# Patient Record
Sex: FEMALE | Race: WHITE | NOT HISPANIC OR LATINO | Employment: OTHER | URBAN - METROPOLITAN AREA
[De-identification: names, ages, dates, MRNs, and addresses within clinical notes are randomized per-mention and may not be internally consistent; named-entity substitution may affect disease eponyms.]

---

## 2020-08-26 ENCOUNTER — APPOINTMENT (EMERGENCY)
Dept: RADIOLOGY | Facility: HOSPITAL | Age: 65
End: 2020-08-26
Payer: COMMERCIAL

## 2020-08-26 ENCOUNTER — HOSPITAL ENCOUNTER (EMERGENCY)
Facility: HOSPITAL | Age: 65
Discharge: HOME | End: 2020-08-26
Attending: EMERGENCY MEDICINE | Admitting: EMERGENCY MEDICINE
Payer: COMMERCIAL

## 2020-08-26 ENCOUNTER — OFFICE VISIT (OUTPATIENT)
Dept: INTERNAL MEDICINE CLINIC | Age: 65
End: 2020-08-26
Payer: COMMERCIAL

## 2020-08-26 VITALS
TEMPERATURE: 98.6 F | SYSTOLIC BLOOD PRESSURE: 160 MMHG | HEART RATE: 97 BPM | DIASTOLIC BLOOD PRESSURE: 80 MMHG | OXYGEN SATURATION: 98 % | HEIGHT: 66 IN | WEIGHT: 158 LBS | BODY MASS INDEX: 25.39 KG/M2

## 2020-08-26 VITALS
HEART RATE: 103 BPM | OXYGEN SATURATION: 97 % | DIASTOLIC BLOOD PRESSURE: 92 MMHG | TEMPERATURE: 98.9 F | BODY MASS INDEX: 25.66 KG/M2 | SYSTOLIC BLOOD PRESSURE: 189 MMHG | WEIGHT: 159 LBS | RESPIRATION RATE: 18 BRPM

## 2020-08-26 DIAGNOSIS — T14.90XA TRAUMA: ICD-10-CM

## 2020-08-26 DIAGNOSIS — Z12.11 COLON CANCER SCREENING: ICD-10-CM

## 2020-08-26 DIAGNOSIS — M25.521 RIGHT ELBOW PAIN: Primary | ICD-10-CM

## 2020-08-26 DIAGNOSIS — E66.3 OVERWEIGHT: ICD-10-CM

## 2020-08-26 DIAGNOSIS — T14.8XXA BRUISING: ICD-10-CM

## 2020-08-26 DIAGNOSIS — M79.89 PAIN AND SWELLING OF UPPER EXTREMITY, RIGHT: ICD-10-CM

## 2020-08-26 DIAGNOSIS — T07.XXXA MULTIPLE CONTUSIONS: ICD-10-CM

## 2020-08-26 DIAGNOSIS — Z12.39 BREAST CANCER SCREENING: ICD-10-CM
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
## 2020-08-26 DIAGNOSIS — S52.021A OLECRANON FRACTURE, RIGHT, CLOSED, INITIAL ENCOUNTER: Primary | ICD-10-CM

## 2020-08-26 DIAGNOSIS — T07.XXXA MULTIPLE ABRASIONS: ICD-10-CM

## 2020-08-26 DIAGNOSIS — S93.401A RIGHT ANKLE SPRAIN: ICD-10-CM

## 2020-08-26 DIAGNOSIS — M79.601 PAIN AND SWELLING OF UPPER EXTREMITY, RIGHT: ICD-10-CM

## 2020-08-26 PROCEDURE — 99284 EMERGENCY DEPT VISIT MOD MDM: CPT | Performed by: PHYSICIAN ASSISTANT

## 2020-08-26 PROCEDURE — 90471 IMMUNIZATION ADMIN: CPT

## 2020-08-26 PROCEDURE — 1036F TOBACCO NON-USER: CPT | Performed by: INTERNAL MEDICINE

## 2020-08-26 PROCEDURE — 73610 X-RAY EXAM OF ANKLE: CPT

## 2020-08-26 PROCEDURE — 73630 X-RAY EXAM OF FOOT: CPT

## 2020-08-26 PROCEDURE — 73090 X-RAY EXAM OF FOREARM: CPT

## 2020-08-26 PROCEDURE — 90715 TDAP VACCINE 7 YRS/> IM: CPT | Performed by: PHYSICIAN ASSISTANT

## 2020-08-26 PROCEDURE — 3008F BODY MASS INDEX DOCD: CPT | Performed by: INTERNAL MEDICINE

## 2020-08-26 PROCEDURE — 3725F SCREEN DEPRESSION PERFORMED: CPT | Performed by: INTERNAL MEDICINE

## 2020-08-26 PROCEDURE — 99203 OFFICE O/P NEW LOW 30 MIN: CPT | Performed by: INTERNAL MEDICINE

## 2020-08-26 PROCEDURE — 99284 EMERGENCY DEPT VISIT MOD MDM: CPT

## 2020-08-26 PROCEDURE — 73080 X-RAY EXAM OF ELBOW: CPT

## 2020-08-26 RX ADMIN — TETANUS TOXOID, REDUCED DIPHTHERIA TOXOID AND ACELLULAR PERTUSSIS VACCINE, ADSORBED 0.5 ML: 5; 2.5; 8; 8; 2.5 SUSPENSION INTRAMUSCULAR at 18:09
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
## 2020-08-27 ENCOUNTER — OFFICE VISIT (OUTPATIENT)
Dept: INTERNAL MEDICINE CLINIC | Age: 65
End: 2020-08-27
Payer: COMMERCIAL

## 2020-08-27 ENCOUNTER — OFFICE VISIT (OUTPATIENT)
Dept: OBGYN CLINIC | Facility: HOSPITAL | Age: 65
End: 2020-08-27
Payer: COMMERCIAL

## 2020-08-27 ENCOUNTER — TELEPHONE (OUTPATIENT)
Dept: INTERNAL MEDICINE CLINIC | Age: 65
End: 2020-08-27

## 2020-08-27 ENCOUNTER — TELEPHONE (OUTPATIENT)
Dept: OBGYN CLINIC | Facility: HOSPITAL | Age: 65
End: 2020-08-27

## 2020-08-27 VITALS
WEIGHT: 155 LBS | HEIGHT: 66 IN | BODY MASS INDEX: 24.91 KG/M2 | TEMPERATURE: 97.1 F | DIASTOLIC BLOOD PRESSURE: 100 MMHG | SYSTOLIC BLOOD PRESSURE: 180 MMHG | HEART RATE: 94 BPM

## 2020-08-27 VITALS
DIASTOLIC BLOOD PRESSURE: 94 MMHG | HEIGHT: 66 IN | SYSTOLIC BLOOD PRESSURE: 158 MMHG | HEART RATE: 112 BPM | BODY MASS INDEX: 24.91 KG/M2 | WEIGHT: 155 LBS

## 2020-08-27 DIAGNOSIS — S52.021D CLOSED FRACTURE OF OLECRANON PROCESS OF RIGHT ULNA WITH ROUTINE HEALING, SUBSEQUENT ENCOUNTER: ICD-10-CM

## 2020-08-27 DIAGNOSIS — I10 UNCONTROLLED STAGE 2 HYPERTENSION: ICD-10-CM

## 2020-08-27 DIAGNOSIS — Z00.00 ANNUAL PHYSICAL EXAM: ICD-10-CM

## 2020-08-27 DIAGNOSIS — S52.021A CLOSED FRACTURE OF RIGHT OLECRANON PROCESS, INITIAL ENCOUNTER: Primary | ICD-10-CM

## 2020-08-27 DIAGNOSIS — I10 ESSENTIAL HYPERTENSION: Primary | ICD-10-CM

## 2020-08-27 DIAGNOSIS — T14.90XA TRAUMA: ICD-10-CM

## 2020-08-27 DIAGNOSIS — S93.401A SPRAIN OF UNSPECIFIED LIGAMENT OF RIGHT ANKLE, INITIAL ENCOUNTER: ICD-10-CM
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
## 2020-08-27 PROCEDURE — 1036F TOBACCO NON-USER: CPT | Performed by: ORTHOPAEDIC SURGERY

## 2020-08-27 PROCEDURE — 99203 OFFICE O/P NEW LOW 30 MIN: CPT | Performed by: ORTHOPAEDIC SURGERY

## 2020-08-27 PROCEDURE — 3080F DIAST BP >= 90 MM HG: CPT | Performed by: INTERNAL MEDICINE

## 2020-08-27 PROCEDURE — 1036F TOBACCO NON-USER: CPT | Performed by: INTERNAL MEDICINE

## 2020-08-27 PROCEDURE — 3077F SYST BP >= 140 MM HG: CPT | Performed by: INTERNAL MEDICINE

## 2020-08-27 PROCEDURE — 3008F BODY MASS INDEX DOCD: CPT | Performed by: INTERNAL MEDICINE

## 2020-08-27 PROCEDURE — 99214 OFFICE O/P EST MOD 30 MIN: CPT | Performed by: INTERNAL MEDICINE

## 2020-08-27 PROCEDURE — 3008F BODY MASS INDEX DOCD: CPT | Performed by: ORTHOPAEDIC SURGERY

## 2020-08-27 RX ORDER — LOSARTAN POTASSIUM AND HYDROCHLOROTHIAZIDE 12.5; 5 MG/1; MG/1
1 TABLET ORAL DAILY
Qty: 60 TABLET | Refills: 1 | Status: SHIPPED | OUTPATIENT
Start: 2020-08-27 | End: 2021-04-26 | Stop reason: SDUPTHER

## 2020-08-27 RX ORDER — CEFAZOLIN SODIUM 2 G/50ML
2000 SOLUTION INTRAVENOUS ONCE
Status: CANCELLED | OUTPATIENT
Start: 2020-08-27 | End: 2020-08-27

## 2020-08-27 RX ORDER — HYDROCODONE BITARTRATE AND ACETAMINOPHEN 5; 325 MG/1; MG/1
1 TABLET ORAL EVERY 6 HOURS PRN
Qty: 30 TABLET | Refills: 0 | Status: SHIPPED | OUTPATIENT
Start: 2020-08-27 | End: 2020-08-31 | Stop reason: HOSPADM

## 2020-08-27 NOTE — TELEPHONE ENCOUNTER
Patient is on schedule already for tomorrow in [facility]  She refuses to wear the sling from ER due to pain  Feels better without it

## 2020-08-27 NOTE — TELEPHONE ENCOUNTER
Pt took splint off too  Wanted to come today as she cannot tolerate splint and wanted to know repercussions of not wearing it  Was originally told she could not be seen today  Appt moved up to today w/Dr Marmolejo

## 2020-08-27 NOTE — TELEPHONE ENCOUNTER
Dr Esteban Freeman went to the ER last night as requested by you  When she was seen they stated that her BP was extremely elevated  She is asking since you seen her yesterday in the office and are aware of her BP being elevated if you can send something into the [facility] in [place] on [address]? If you have any further questions you can reach the PT at [phone]  Thank you!
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
## 2020-08-27 NOTE — TELEPHONE ENCOUNTER
Left msg for patient to call office to schedule appt with Dr Juarez Carroll for this afternoon Belen Tenorio or tomorrow [facility]
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
## 2020-08-27 NOTE — PATIENT INSTRUCTIONS
PLEASE CHECK YOUR BLOOD PRESSURE TWICE A DAY  KEEP A BLOOD PRESSURE LOG  IF YOUR BLOOD PRESSURE IS CONSISTENTLY ABOVE 130/80, PLEASE CALL THE OFFICE

## 2020-08-27 NOTE — TELEPHONE ENCOUNTER
She needs surgery    If she wants Brogle to do it next week - she should be seen today (BE) or tomorrow (ARLETTE)

## 2020-08-27 NOTE — RESULT ENCOUNTER NOTE
Call patient at phone number listed  Patient name and date of birth use is positive patient identifiers  Currently at orthopedic office  Will call back after appointment

## 2020-08-28 NOTE — PRE-PROCEDURE INSTRUCTIONS
Pre-Surgery Instructions:   Medication Instructions    HYDROcodone-acetaminophen (NORCO) 5-325 mg per tablet Instructed patient per Anesthesia Guidelines   Ibuprofen (ADVIL PO) Instructed patient per Anesthesia Guidelines   losartan-hydrochlorothiazide (HYZAAR) 50-12 5 mg per tablet Instructed patient per Anesthesia Guidelines  Have you had / have a sore throat? No  have you had / have a cough less than 1 week? No  Have you had / have a fever greater than 100 0 - 100  4? No  Are you experiencing any shortness of breath? No    Review with patient via phone medications and showering instructions  Advice stop NSAID'S ok tylenol products  Advice don't take Hyzaar the morning of the day of surgery  Advice ASC call  Verbalized understanding  ACE/ARB Med Class   Continue this medication up to the evening before surgery/procedure, but do not take the morning of the day of surgery  Acetaminophen Med Class   Continue to take this medication on your normal schedule  If this is an oral medication and you take it in the morning, then you may take this medicine with a sip of water  NSAID Med Class   Stop taking this medication at least 3 days prior to surgery/procedure  Opioid Med Class   Continue to take this medication on your normal schedule    If this is an oral medication and you take it in the morning, then you may take this medicine with a sip of water

## 2020-08-30 ENCOUNTER — ANESTHESIA EVENT (OUTPATIENT)
Dept: PERIOP | Facility: HOSPITAL | Age: 65
End: 2020-08-30
Payer: COMMERCIAL

## 2020-08-30 PROBLEM — J45.909 ASTHMA: Status: ACTIVE | Noted: 2020-08-30

## 2020-08-30 NOTE — ANESTHESIA PREPROCEDURE EVALUATION
Procedure:  OPEN REDUCTION W/ INTERNAL FIXATION (ORIF) ELBOW (Right Elbow)    Relevant Problems   CARDIO   (+) Essential hypertension      PULMONARY   (+) Smoking        Physical Exam    Airway    Mallampati score: I  TM Distance: >3 FB  Neck ROM: full     Dental   No notable dental hx     Cardiovascular      Pulmonary      Other Findings        Anesthesia Plan  ASA Score- 2     Anesthesia Type- general and regional with ASA Monitors  Additional Monitors:   Airway Plan: ETT  Comment: Supraclavicular Block Planned          Plan Factors-Exercise tolerance (METS): >4 METS  Chart reviewed  EKG reviewed  Existing labs reviewed  Patient is a current smoker  Patient not instructed to abstain from smoking on day of procedure  Patient did not smoke on day of surgery  Induction- intravenous  Postoperative Plan-     Informed Consent- Anesthetic plan and risks discussed with patient and sibling  I personally reviewed this patient with the CRNA  Discussed and agreed on the Anesthesia Plan with the CRNA  Neymar Mckee Discussed with Patient the procedure for Supraclavicular Block, side effects including extended numbness of the extremity and potential for an incomplete Block  All questions answered  Consent given

## 2020-08-31 ENCOUNTER — TELEPHONE (OUTPATIENT)
Dept: OBGYN CLINIC | Facility: HOSPITAL | Age: 65
End: 2020-08-31

## 2020-08-31 ENCOUNTER — HOSPITAL ENCOUNTER (OUTPATIENT)
Dept: RADIOLOGY | Facility: HOSPITAL | Age: 65
Setting detail: OUTPATIENT SURGERY
Discharge: HOME | End: 2020-08-31
Payer: COMMERCIAL

## 2020-08-31 ENCOUNTER — HOSPITAL ENCOUNTER (OUTPATIENT)
Facility: HOSPITAL | Age: 65
Setting detail: OUTPATIENT SURGERY
Discharge: HOME | End: 2020-08-31
Attending: ORTHOPAEDIC SURGERY | Admitting: ORTHOPAEDIC SURGERY
Payer: COMMERCIAL

## 2020-08-31 ENCOUNTER — ANESTHESIA (OUTPATIENT)
Dept: PERIOP | Facility: HOSPITAL | Age: 65
End: 2020-08-31
Payer: COMMERCIAL

## 2020-08-31 VITALS
DIASTOLIC BLOOD PRESSURE: 74 MMHG | OXYGEN SATURATION: 93 % | BODY MASS INDEX: 25.55 KG/M2 | SYSTOLIC BLOOD PRESSURE: 106 MMHG | HEIGHT: 66 IN | WEIGHT: 159 LBS | RESPIRATION RATE: 18 BRPM | HEART RATE: 66 BPM | TEMPERATURE: 98.7 F

## 2020-08-31 DIAGNOSIS — S52.021A CLOSED FRACTURE OF RIGHT OLECRANON PROCESS, INITIAL ENCOUNTER: ICD-10-CM

## 2020-08-31 DIAGNOSIS — S52.021G: ICD-10-CM

## 2020-08-31 DIAGNOSIS — S52.021D CLOSED FRACTURE OF OLECRANON PROCESS OF RIGHT ULNA WITH ROUTINE HEALING, SUBSEQUENT ENCOUNTER: Primary | ICD-10-CM
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
## 2020-08-31 PROBLEM — F17.200 SMOKING: Status: ACTIVE | Noted: 2020-08-31

## 2020-08-31 PROBLEM — IMO0001 SMOKING: Status: ACTIVE | Noted: 2020-08-31

## 2020-08-31 LAB
ANION GAP SERPL CALCULATED.3IONS-SCNC: 7 MMOL/L (ref 4–13)
ATRIAL RATE: 93 BPM
BUN SERPL-MCNC: 11 MG/DL (ref 5–25)
CALCIUM SERPL-MCNC: 9.7 MG/DL (ref 8.3–10.1)
CHLORIDE SERPL-SCNC: 99 MMOL/L (ref 100–108)
CO2 SERPL-SCNC: 28 MMOL/L (ref 21–32)
CREAT SERPL-MCNC: 0.87 MG/DL (ref 0.6–1.3)
ERYTHROCYTE [DISTWIDTH] IN BLOOD BY AUTOMATED COUNT: 12.2 % (ref 11.6–15.1)
GFR SERPL CREATININE-BSD FRML MDRD: 72 ML/MIN/1.73SQ M
GLUCOSE P FAST SERPL-MCNC: 148 MG/DL (ref 65–99)
GLUCOSE SERPL-MCNC: 148 MG/DL (ref 65–140)
HCT VFR BLD AUTO: 42.6 % (ref 34.8–46.1)
HGB BLD-MCNC: 14.4 G/DL (ref 11.5–15.4)
MCH RBC QN AUTO: 30.7 PG (ref 26.8–34.3)
MCHC RBC AUTO-ENTMCNC: 33.8 G/DL (ref 31.4–37.4)
MCV RBC AUTO: 91 FL (ref 82–98)
P AXIS: 75 DEGREES
PLATELET # BLD AUTO: 348 THOUSANDS/UL (ref 149–390)
PMV BLD AUTO: 10 FL (ref 8.9–12.7)
POTASSIUM SERPL-SCNC: 3.6 MMOL/L (ref 3.5–5.3)
PR INTERVAL: 160 MS
QRS AXIS: 59 DEGREES
QRSD INTERVAL: 78 MS
QT INTERVAL: 360 MS
QTC INTERVAL: 447 MS
RBC # BLD AUTO: 4.69 MILLION/UL (ref 3.81–5.12)
SODIUM SERPL-SCNC: 134 MMOL/L (ref 136–145)
T WAVE AXIS: 55 DEGREES
VENTRICULAR RATE: 93 BPM
WBC # BLD AUTO: 11.71 THOUSAND/UL (ref 4.31–10.16)

## 2020-08-31 PROCEDURE — 93010 ELECTROCARDIOGRAM REPORT: CPT | Performed by: INTERNAL MEDICINE

## 2020-08-31 PROCEDURE — C1713 ANCHOR/SCREW BN/BN,TIS/BN: HCPCS | Performed by: ORTHOPAEDIC SURGERY

## 2020-08-31 PROCEDURE — 80048 BASIC METABOLIC PNL TOTAL CA: CPT | Performed by: ANESTHESIOLOGY

## 2020-08-31 PROCEDURE — 73070 X-RAY EXAM OF ELBOW: CPT

## 2020-08-31 PROCEDURE — 93005 ELECTROCARDIOGRAM TRACING: CPT

## 2020-08-31 PROCEDURE — 85027 COMPLETE CBC AUTOMATED: CPT | Performed by: ANESTHESIOLOGY

## 2020-08-31 PROCEDURE — 24685 OPTX ULNAR FX PROX END W/FIX: CPT | Performed by: ORTHOPAEDIC SURGERY

## 2020-08-31 DEVICE — 3.5MM LCP OLECRANON PLATE 4 HOLES/RIGHT/112MM
Type: IMPLANTABLE DEVICE | Site: ELBOW | Status: FUNCTIONAL
Brand: LCP

## 2020-08-31 DEVICE — 3.5MM CORTEX SCREW SELF-TAPPING 20MM: Type: IMPLANTABLE DEVICE | Site: ELBOW | Status: FUNCTIONAL

## 2020-08-31 DEVICE — 3.5MM CORTEX SCREW SELF-TAPPING 30MM: Type: IMPLANTABLE DEVICE | Site: ELBOW | Status: FUNCTIONAL

## 2020-08-31 DEVICE — 4.0MM CANCELLOUS BONE SCREW FULLY THREADED/20MM: Type: IMPLANTABLE DEVICE | Site: ELBOW | Status: FUNCTIONAL

## 2020-08-31 DEVICE — 4.0MM CANCELLOUS BONE SCREW FULLY THREADED/22MM: Type: IMPLANTABLE DEVICE | Site: ELBOW | Status: FUNCTIONAL

## 2020-08-31 DEVICE — 3.5MM CORTEX SCREW SELF-TAPPING 28MM: Type: IMPLANTABLE DEVICE | Site: ELBOW | Status: FUNCTIONAL

## 2020-08-31 DEVICE — 3.5MM CORTEX SCREW SELF-TAPPING 24MM: Type: IMPLANTABLE DEVICE | Site: ELBOW | Status: FUNCTIONAL

## 2020-08-31 DEVICE — 3.5MM CORTEX SCREW SELF-TAPPING 55MM: Type: IMPLANTABLE DEVICE | Site: ELBOW | Status: FUNCTIONAL

## 2020-08-31 RX ORDER — ONDANSETRON 2 MG/ML
INJECTION INTRAMUSCULAR; INTRAVENOUS AS NEEDED
Status: DISCONTINUED | OUTPATIENT
Start: 2020-08-31 | End: 2020-08-31

## 2020-08-31 RX ORDER — HYDROMORPHONE HCL/PF 1 MG/ML
0.5 SYRINGE (ML) INJECTION
Status: DISCONTINUED | OUTPATIENT
Start: 2020-08-31 | End: 2020-08-31 | Stop reason: HOSPADM

## 2020-08-31 RX ORDER — OXYCODONE HYDROCHLORIDE 5 MG/1
5 TABLET ORAL EVERY 4 HOURS PRN
Qty: 30 TABLET | Refills: 0 | Status: SHIPPED | OUTPATIENT
Start: 2020-08-31 | End: 2020-09-10

## 2020-08-31 RX ORDER — ROCURONIUM BROMIDE 10 MG/ML
INJECTION, SOLUTION INTRAVENOUS AS NEEDED
Status: DISCONTINUED | OUTPATIENT
Start: 2020-08-31 | End: 2020-08-31

## 2020-08-31 RX ORDER — ALBUTEROL SULFATE 2.5 MG/3ML
2.5 SOLUTION RESPIRATORY (INHALATION) ONCE AS NEEDED
Status: DISCONTINUED | OUTPATIENT
Start: 2020-08-31 | End: 2020-08-31 | Stop reason: HOSPADM

## 2020-08-31 RX ORDER — SODIUM CHLORIDE, SODIUM LACTATE, POTASSIUM CHLORIDE, CALCIUM CHLORIDE 600; 310; 30; 20 MG/100ML; MG/100ML; MG/100ML; MG/100ML
125 INJECTION, SOLUTION INTRAVENOUS CONTINUOUS
Status: DISCONTINUED | OUTPATIENT
Start: 2020-08-31 | End: 2020-08-31 | Stop reason: HOSPADM

## 2020-08-31 RX ORDER — PROPOFOL 10 MG/ML
INJECTION, EMULSION INTRAVENOUS AS NEEDED
Status: DISCONTINUED | OUTPATIENT
Start: 2020-08-31 | End: 2020-08-31

## 2020-08-31 RX ORDER — ONDANSETRON 2 MG/ML
4 INJECTION INTRAMUSCULAR; INTRAVENOUS ONCE AS NEEDED
Status: DISCONTINUED | OUTPATIENT
Start: 2020-08-31 | End: 2020-08-31 | Stop reason: HOSPADM

## 2020-08-31 RX ORDER — ROPIVACAINE HYDROCHLORIDE 5 MG/ML
INJECTION, SOLUTION EPIDURAL; INFILTRATION; PERINEURAL AS NEEDED
Status: DISCONTINUED | OUTPATIENT
Start: 2020-08-31 | End: 2020-08-31

## 2020-08-31 RX ORDER — OXYCODONE HYDROCHLORIDE 5 MG/1
5 TABLET ORAL ONCE AS NEEDED
Status: DISCONTINUED | OUTPATIENT
Start: 2020-08-31 | End: 2020-08-31 | Stop reason: HOSPADM

## 2020-08-31 RX ORDER — DEXMEDETOMIDINE HYDROCHLORIDE 100 UG/ML
INJECTION, SOLUTION INTRAVENOUS AS NEEDED
Status: DISCONTINUED | OUTPATIENT
Start: 2020-08-31 | End: 2020-08-31

## 2020-08-31 RX ORDER — ACETAMINOPHEN 325 MG/1
975 TABLET ORAL ONCE
Status: DISCONTINUED | OUTPATIENT
Start: 2020-08-31 | End: 2020-08-31

## 2020-08-31 RX ORDER — FENTANYL CITRATE/PF 50 MCG/ML
25 SYRINGE (ML) INJECTION
Status: DISCONTINUED | OUTPATIENT
Start: 2020-08-31 | End: 2020-08-31 | Stop reason: HOSPADM

## 2020-08-31 RX ORDER — ONDANSETRON 2 MG/ML
4 INJECTION INTRAMUSCULAR; INTRAVENOUS EVERY 6 HOURS PRN
Status: DISCONTINUED | OUTPATIENT
Start: 2020-08-31 | End: 2020-08-31 | Stop reason: HOSPADM

## 2020-08-31 RX ORDER — ACETAMINOPHEN 500 MG
1000 TABLET ORAL EVERY 6 HOURS PRN
COMMUNITY
End: 2021-12-09 | Stop reason: ALTCHOICE

## 2020-08-31 RX ORDER — HYDROMORPHONE HCL/PF 1 MG/ML
0.2 SYRINGE (ML) INJECTION ONCE AS NEEDED
Status: DISCONTINUED | OUTPATIENT
Start: 2020-08-31 | End: 2020-08-31 | Stop reason: HOSPADM

## 2020-08-31 RX ORDER — TRAMADOL HYDROCHLORIDE 50 MG/1
50 TABLET ORAL EVERY 6 HOURS PRN
Qty: 30 TABLET | Refills: 0 | Status: SHIPPED | OUTPATIENT
Start: 2020-08-31 | End: 2020-09-10

## 2020-08-31 RX ORDER — MAGNESIUM HYDROXIDE 1200 MG/15ML
LIQUID ORAL AS NEEDED
Status: DISCONTINUED | OUTPATIENT
Start: 2020-08-31 | End: 2020-08-31 | Stop reason: HOSPADM

## 2020-08-31 RX ORDER — DEXAMETHASONE SODIUM PHOSPHATE 10 MG/ML
INJECTION, SOLUTION INTRAMUSCULAR; INTRAVENOUS AS NEEDED
Status: DISCONTINUED | OUTPATIENT
Start: 2020-08-31 | End: 2020-08-31

## 2020-08-31 RX ORDER — CEFAZOLIN SODIUM 2 G/50ML
2000 SOLUTION INTRAVENOUS ONCE
Status: COMPLETED | OUTPATIENT
Start: 2020-08-31 | End: 2020-08-31

## 2020-08-31 RX ORDER — ALBUTEROL SULFATE 90 UG/1
AEROSOL, METERED RESPIRATORY (INHALATION) AS NEEDED
Status: DISCONTINUED | OUTPATIENT
Start: 2020-08-31 | End: 2020-08-31

## 2020-08-31 RX ORDER — FENTANYL CITRATE 50 UG/ML
INJECTION, SOLUTION INTRAMUSCULAR; INTRAVENOUS AS NEEDED
Status: DISCONTINUED | OUTPATIENT
Start: 2020-08-31 | End: 2020-08-31

## 2020-08-31 RX ORDER — NEOSTIGMINE METHYLSULFATE 1 MG/ML
INJECTION INTRAVENOUS AS NEEDED
Status: DISCONTINUED | OUTPATIENT
Start: 2020-08-31 | End: 2020-08-31

## 2020-08-31 RX ORDER — LIDOCAINE HYDROCHLORIDE 10 MG/ML
INJECTION, SOLUTION EPIDURAL; INFILTRATION; INTRACAUDAL; PERINEURAL AS NEEDED
Status: DISCONTINUED | OUTPATIENT
Start: 2020-08-31 | End: 2020-08-31

## 2020-08-31 RX ORDER — METOCLOPRAMIDE HYDROCHLORIDE 5 MG/ML
10 INJECTION INTRAMUSCULAR; INTRAVENOUS ONCE AS NEEDED
Status: DISCONTINUED | OUTPATIENT
Start: 2020-08-31 | End: 2020-08-31 | Stop reason: HOSPADM

## 2020-08-31 RX ORDER — GLYCOPYRROLATE 0.2 MG/ML
INJECTION INTRAMUSCULAR; INTRAVENOUS AS NEEDED
Status: DISCONTINUED | OUTPATIENT
Start: 2020-08-31 | End: 2020-08-31

## 2020-08-31 RX ORDER — LIDOCAINE HYDROCHLORIDE 10 MG/ML
0.5 INJECTION, SOLUTION EPIDURAL; INFILTRATION; INTRACAUDAL; PERINEURAL ONCE AS NEEDED
Status: COMPLETED | OUTPATIENT
Start: 2020-08-31 | End: 2020-08-31

## 2020-08-31 RX ORDER — ALBUMIN, HUMAN INJ 5% 5 %
12.5 SOLUTION INTRAVENOUS ONCE AS NEEDED
Status: DISCONTINUED | OUTPATIENT
Start: 2020-08-31 | End: 2020-08-31 | Stop reason: HOSPADM

## 2020-08-31 RX ORDER — MIDAZOLAM HYDROCHLORIDE 2 MG/2ML
INJECTION, SOLUTION INTRAMUSCULAR; INTRAVENOUS AS NEEDED
Status: DISCONTINUED | OUTPATIENT
Start: 2020-08-31 | End: 2020-08-31

## 2020-08-31 RX ORDER — PROPOFOL 10 MG/ML
INJECTION, EMULSION INTRAVENOUS CONTINUOUS PRN
Status: DISCONTINUED | OUTPATIENT
Start: 2020-08-31 | End: 2020-08-31

## 2020-08-31 RX ADMIN — PROPOFOL 120 MCG/KG/MIN: 10 INJECTION, EMULSION INTRAVENOUS at 11:05

## 2020-08-31 RX ADMIN — PHENYLEPHRINE HYDROCHLORIDE 20 MCG/MIN: 10 INJECTION INTRAVENOUS at 11:05

## 2020-08-31 RX ADMIN — GLYCOPYRROLATE 0.6 MG: 0.2 INJECTION, SOLUTION INTRAMUSCULAR; INTRAVENOUS at 12:02

## 2020-08-31 RX ADMIN — DEXMEDETOMIDINE 40 MCG: 100 INJECTION, SOLUTION, CONCENTRATE INTRAVENOUS at 10:26

## 2020-08-31 RX ADMIN — ONDANSETRON 4 MG: 2 INJECTION INTRAMUSCULAR; INTRAVENOUS at 11:13

## 2020-08-31 RX ADMIN — ROCURONIUM BROMIDE 20 MG: 10 INJECTION, SOLUTION INTRAVENOUS at 11:29

## 2020-08-31 RX ADMIN — LIDOCAINE HYDROCHLORIDE 50 MG: 10 INJECTION, SOLUTION EPIDURAL; INFILTRATION; INTRACAUDAL; PERINEURAL at 11:01

## 2020-08-31 RX ADMIN — SODIUM CHLORIDE, SODIUM LACTATE, POTASSIUM CHLORIDE, AND CALCIUM CHLORIDE: .6; .31; .03; .02 INJECTION, SOLUTION INTRAVENOUS at 12:02

## 2020-08-31 RX ADMIN — ROCURONIUM BROMIDE 30 MG: 10 INJECTION, SOLUTION INTRAVENOUS at 11:01

## 2020-08-31 RX ADMIN — FENTANYL CITRATE 50 MCG: 50 INJECTION, SOLUTION INTRAMUSCULAR; INTRAVENOUS at 10:17

## 2020-08-31 RX ADMIN — ROPIVACAINE HYDROCHLORIDE 30 ML: 5 INJECTION, SOLUTION EPIDURAL; INFILTRATION; PERINEURAL at 10:26

## 2020-08-31 RX ADMIN — FENTANYL CITRATE 50 MCG: 50 INJECTION, SOLUTION INTRAMUSCULAR; INTRAVENOUS at 11:01

## 2020-08-31 RX ADMIN — MIDAZOLAM 1 MG: 1 INJECTION INTRAMUSCULAR; INTRAVENOUS at 10:17

## 2020-08-31 RX ADMIN — CEFAZOLIN SODIUM 2000 MG: 2 SOLUTION INTRAVENOUS at 10:55

## 2020-08-31 RX ADMIN — NEOSTIGMINE METHYLSULFATE 3 MG: 1 INJECTION, SOLUTION INTRAVENOUS at 12:02

## 2020-08-31 RX ADMIN — SODIUM CHLORIDE, SODIUM LACTATE, POTASSIUM CHLORIDE, AND CALCIUM CHLORIDE 125 ML/HR: .6; .31; .03; .02 INJECTION, SOLUTION INTRAVENOUS at 10:00

## 2020-08-31 RX ADMIN — ALBUTEROL SULFATE 4 PUFF: 90 AEROSOL, METERED RESPIRATORY (INHALATION) at 12:17

## 2020-08-31 RX ADMIN — DEXAMETHASONE SODIUM PHOSPHATE 10 MG: 10 INJECTION, SOLUTION INTRAMUSCULAR; INTRAVENOUS at 11:13

## 2020-08-31 RX ADMIN — PROPOFOL 170 MG: 10 INJECTION, EMULSION INTRAVENOUS at 11:01

## 2020-08-31 RX ADMIN — LIDOCAINE HYDROCHLORIDE 0.5 ML: 10 INJECTION, SOLUTION EPIDURAL; INFILTRATION; INTRACAUDAL; PERINEURAL at 10:00

## 2020-08-31 NOTE — ANESTHESIA POSTPROCEDURE EVALUATION
Post-Op Assessment Note    CV Status:  Stable  Pain Score: 0    Pain management: adequate     Mental Status:  Awake (DROWSY)   Hydration Status:  Euvolemic and stable   PONV Controlled:  Controlled   Airway Patency:  Patent      Post Op Vitals Reviewed: Yes      Staff: Anesthesiologist, CRNA         No complications documented      BP 92/52 (08/31/20 1234)    Temp (!) 96 9 °F (36 1 °C) (08/31/20 1234)    Pulse 55 (08/31/20 1234)   Resp 16 (08/31/20 1234)    SpO2   97%

## 2020-08-31 NOTE — INTERVAL H&P NOTE
H&P reviewed  After examining the patient I find no changes in the patients condition since the H&P had been written  Vitals:    08/31/20 0857   BP: 150/88   Pulse: (!) 109   Resp: 20   Temp: 99 5 °F (37 5 °C)   SpO2: 94%   CVS:  RRR  Lungs:  Clear bilateral  Assessment:  Widely displaced fracture of the right olecranon process on the right arm of this adult female  Plan:   To OR for ORIF right olecranon

## 2020-08-31 NOTE — TELEPHONE ENCOUNTER
Patient just had surgery today    Please fill the medication Brogle requested today for her acute post-op pain

## 2020-08-31 NOTE — ANESTHESIA PROCEDURE NOTES
Peripheral Block    Patient location during procedure: holding area  Start time: 8/31/2020 10:17 AM  Reason for block: at surgeon's request and post-op pain management  Staffing  Anesthesiologist: Azra Begum MD  Performed: anesthesiologist   Preanesthetic Checklist  Completed: patient identified, site marked, surgical consent, pre-op evaluation, timeout performed, IV checked, risks and benefits discussed and monitors and equipment checked  Peripheral Block  Patient position: SEMI-RECUMBENT  Prep: ChloraPrep  Patient monitoring: heart rate, continuous pulse ox and frequent blood pressure checks  Block type: supraclavicular  Laterality: right  Injection technique: single-shot  Procedures: ultrasound guided, Ultrasound guidance required for the procedure to increase accuracy and safety of medication placement and decrease risk of complications  Ultrasound permanent image saved  Needle  Needle type: Stimuplex   Needle gauge: 20G    Needle length: 10 cm  Needle localization: ultrasound guidance  Needle insertion depth: 2 cm  Assessment  Injection assessment: incremental injection, local visualized surrounding nerve on ultrasound, negative aspiration for heme and no paresthesia on injection  Paresthesia pain: none  Heart rate change: no  Slow fractionated injection: yes  Post-procedure:  site cleaned  patient tolerated the procedure well with no immediate complications  Additional Notes  Easy Visualization Bundle And Placement of Local
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
## 2020-08-31 NOTE — DISCHARGE INSTRUCTIONS
Discharge Instructions - Orthopedics  Caroline Krishnan 60 y o  female MRN: [id number]  Unit/Bed#: Operating Room    Weight Bearing Status:                                           No weight bearing   Sling    DVT prophylaxis  none    Pain:  Oxycodone 1 every 4 hours prn severe pain  Tramadol 1 every 6 hours prn moderate pain  Tylenol 650 every 4 hours prn      Dressing Instructions:   Please keep clean, dry and intact until follow up     Appt Instructions: If you do not have your appointment, please call the clinic at [phone] t  Otherwise followup as scheduled for 2 weeks    Contact the office sooner if you experience any increased numbness/tingling in the extremities

## 2020-08-31 NOTE — TELEPHONE ENCOUNTER
[facility] is calling to notify that the patient picked up the order for HYDROcodone-acetaminophen (NORCO) 5-325 mg per tablet [[id number]] -quantity - 30  Take 1 tablet by mouth every 6 (six) hours as needed for pain for up to 30 dosesMax Daily Amount: 4 tablets       On 08-27-20 at 7 pm and is now ordered traMADol (ULTRAM) 50 mg tablet [[id number]] -quantity - 30  Take 1 tablet (50 mg total) by mouth every 6 (six) hours as needed for moderate pain for up to 10 days     And     oxyCODONE (ROXICODONE) 5 mg immediate release tablet [[id number]] -quantity 30  Take 1 tablet (5 mg total) by mouth every 4 (four) hours as needed for moderate pain for up to 10 daysMax Daily Amount: 30 mg    Please call the pharmacy to confirm

## 2020-09-03 DIAGNOSIS — Z48.89 AFTERCARE FOLLOWING SURGERY: Primary | ICD-10-CM

## 2020-09-10 ENCOUNTER — HOSPITAL ENCOUNTER (OUTPATIENT)
Dept: RADIOLOGY | Facility: HOSPITAL | Age: 65
Discharge: HOME | End: 2020-09-10
Attending: ORTHOPAEDIC SURGERY
Payer: COMMERCIAL

## 2020-09-10 ENCOUNTER — OFFICE VISIT (OUTPATIENT)
Dept: OBGYN CLINIC | Facility: HOSPITAL | Age: 65
End: 2020-09-10

## 2020-09-10 VITALS
DIASTOLIC BLOOD PRESSURE: 108 MMHG | HEIGHT: 66 IN | BODY MASS INDEX: 25.66 KG/M2 | HEART RATE: 94 BPM | SYSTOLIC BLOOD PRESSURE: 170 MMHG

## 2020-09-10 DIAGNOSIS — Z48.89 AFTERCARE FOLLOWING SURGERY: ICD-10-CM

## 2020-09-10 DIAGNOSIS — Z09 FRACTURE FOLLOW-UP: Primary | ICD-10-CM

## 2020-09-10 PROCEDURE — 73070 X-RAY EXAM OF ELBOW: CPT

## 2020-09-10 PROCEDURE — 99024 POSTOP FOLLOW-UP VISIT: CPT | Performed by: ORTHOPAEDIC SURGERY

## 2020-09-10 NOTE — PROGRESS NOTES
Subjective; 72-year-old adult female 10 days status post ORIF of her right olecranon  She is wearing her postop splint her right arm is in a sling she denies significant pain    X-rays of the right elbow attention to ORIF of the right olecranon were obtained at today's visit  Past Medical History:   Diagnosis Date    Asthma     History of transfusion     Hypertension     PONV (postoperative nausea and vomiting)        Past Surgical History:   Procedure Laterality Date    BUNIONECTOMY      ELBOW FRACTURE REPAIR Right 8/31/2020    Procedure: OPEN REDUCTION W/ INTERNAL FIXATION (ORIF) ELBOW;  Surgeon: Elizabeth Coe MD;  Location: [facility];  Service: Orthopedics    TUBAL LIGATION         Family History   Problem Relation Age of Onset   [facility] Parkinsonism Mother     Hypertension Mother     Lung cancer Father     Kidney cancer Father        Social History     Tobacco Use    Smoking status: Former Smoker    Smokeless tobacco: Never Used   Substance Use Topics    Alcohol use: Yes     Frequency: 2-3 times a week     Drinks per session: 1 or 2     Binge frequency: Never     Comment: sociL     Drug use: Yes     Types: Marijuana       Exam;    The splint was removed in its entirety  She has expected bruising and swelling which has uniform and without compromise to sensation or motor function  She had the sutures removed from her incision and Steri-Strips with tincture benzoin applied  She is able to perform dorsiflexion of the right wrist as well as finger abduction    X-rays;    Right elbow series shows evidence of operative fixation of her right olecranon fracture  Impression;     Follow-up for ORIF of the right olecranon    Plan;      Sutures removed Steri-Strips applied  She will continue the use of the sling  Physical therapy is ordered to reinstitute range of motion  We will see her in follow-up in several weeks x-rays repeat right elbow series would be appropriate at that time    Her entire experience was supervised by and plan formulated by the attending it was my privilege to assist him in its delivery

## 2020-09-15 ENCOUNTER — EVALUATION (OUTPATIENT)
Dept: PHYSICAL THERAPY | Age: 65
End: 2020-09-15
Payer: COMMERCIAL

## 2020-09-15 DIAGNOSIS — Z09 FRACTURE FOLLOW-UP: ICD-10-CM

## 2020-09-15 DIAGNOSIS — S52.021G OLECRANON FRACTURE, RIGHT, CLOSED, WITH DELAYED HEALING, SUBSEQUENT ENCOUNTER: ICD-10-CM

## 2020-09-15 DIAGNOSIS — M25.521 RIGHT ELBOW PAIN: Primary | ICD-10-CM

## 2020-09-15 PROCEDURE — 97110 THERAPEUTIC EXERCISES: CPT | Performed by: PHYSICAL THERAPIST

## 2020-09-15 PROCEDURE — 97162 PT EVAL MOD COMPLEX 30 MIN: CPT | Performed by: PHYSICAL THERAPIST
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
## 2020-09-17 ENCOUNTER — OFFICE VISIT (OUTPATIENT)
Dept: PHYSICAL THERAPY | Age: 65
End: 2020-09-17
Payer: COMMERCIAL

## 2020-09-17 DIAGNOSIS — M25.521 RIGHT ELBOW PAIN: Primary | ICD-10-CM

## 2020-09-17 DIAGNOSIS — S52.021G OLECRANON FRACTURE, RIGHT, CLOSED, WITH DELAYED HEALING, SUBSEQUENT ENCOUNTER: ICD-10-CM

## 2020-09-17 DIAGNOSIS — Z09 FRACTURE FOLLOW-UP: ICD-10-CM

## 2020-09-17 PROCEDURE — 97110 THERAPEUTIC EXERCISES: CPT | Performed by: PHYSICAL THERAPIST

## 2020-09-17 PROCEDURE — 97140 MANUAL THERAPY 1/> REGIONS: CPT | Performed by: PHYSICAL THERAPIST

## 2020-09-17 NOTE — PROGRESS NOTES
Daily Note     Today's date: 2020  Patient name: Bettye Muñoz  : 1955  MRN: [id number]  Referring provider: Lisa Jones  Dx:   Encounter Diagnosis     ICD-10-CM    1  Right elbow pain  M25 521    2  Fracture follow-up  Z09    3  Olecranon fracture, right, closed, with delayed healing, subsequent encounter  S52 021G        Start Time:   Stop Time:   Total time in clinic (min): 50 minutes    Subjective: Pt reports feeling increased stiffness of arm since wearing sling at prescribed dose  Objective: See treatment diary below    Girth Measurements:   Figure 8 hand: 48 cm  [place]: 35 cm  Styloid processes of wrist: 22 cm      Assessment: Pt tolerated treatment well  Pt acknowledged education on importance of continuing to wear sling  Pt responded positively to retrograde STM and demonstrated increased tolerance of PROM  Patient exhibited good technique with therapeutic exercises and would benefit from continued skilled PT  Plan: Continue per plan of care         Precautions: HTN       Manuals             PT assisted light physiologic mobilization  [facility]            STM for edema  HH                                      Neuro Re-Ed                                                                                                        Ther Ex 9/15 9/17           Supination pronation within pain free range HEP            PROM elbow flexion HEP HH           Towel squeeze wrist  HEP            AROM wrist w/weight  0# 2x10 flexion/ext/raidal + ulnar dev           AAROM cane elbow flexion b/l  2*10           Pendullums  10'                                     Ther Activity                                       Gait Training                                       Modalities

## 2020-09-21 ENCOUNTER — OFFICE VISIT (OUTPATIENT)
Dept: PHYSICAL THERAPY | Age: 65
End: 2020-09-21
Payer: COMMERCIAL

## 2020-09-21 DIAGNOSIS — M25.521 RIGHT ELBOW PAIN: Primary | ICD-10-CM

## 2020-09-21 DIAGNOSIS — Z09 FRACTURE FOLLOW-UP: ICD-10-CM

## 2020-09-21 DIAGNOSIS — S52.021G OLECRANON FRACTURE, RIGHT, CLOSED, WITH DELAYED HEALING, SUBSEQUENT ENCOUNTER: ICD-10-CM

## 2020-09-21 PROCEDURE — 97140 MANUAL THERAPY 1/> REGIONS: CPT | Performed by: PHYSICAL THERAPIST

## 2020-09-21 PROCEDURE — 97110 THERAPEUTIC EXERCISES: CPT | Performed by: PHYSICAL THERAPIST

## 2020-09-21 NOTE — PROGRESS NOTES
Daily Note     Today's date: 2020  Patient name: Jennifer Pratt  : 1955  MRN: [id number]  Referring provider: Frandy Parra  Dx:   Encounter Diagnosis     ICD-10-CM    1  Right elbow pain  M25 521    2  Fracture follow-up  Z09    3  Olecranon fracture, right, closed, with delayed healing, subsequent encounter  S52 021G        Start Time: 1700  Stop Time: 1745  Total time in clinic (min): 45 minutes    Subjective: Pt reports feeling increased stiffness of arm since wearing sling at prescribed dose  Objective: See treatment diary below    Girth Measurements:   Figure 8 hand:  42 5 cm  Olcreanon: 27 cm  Styloid processes of wrist: 17 cm      Assessment: Pt tolerated treatment well  Pt's improvements in swelling are demonstrated in circumferential measurements  Pt responded positively to retrograde STM and demonstrated increased tolerance of PROM  Patient exhibited good technique with therapeutic exercises and would benefit from continued skilled PT  Plan: Continue per plan of care         Precautions: HTN       Manuals             PT assisted light physiologic mobilization  HH            STM for edema  JF                                      Neuro Re-Ed                                                                                                        Ther Ex 9/15 9/21           Supination pronation within pain free range HEP            PROM elbow flexion HEP HH           Towel squeeze wrist  HEP            AROM wrist w/weight  0# 2x10 flexion/ext/raidal + ulnar dev           AAROM cane elbow flexion b/l  2*10           Pendullums  10'           Scap retracts  2*10*5"                        Ther Activity                                       Gait Training                                       Modalities

## 2020-09-24 ENCOUNTER — OFFICE VISIT (OUTPATIENT)
Dept: PHYSICAL THERAPY | Age: 65
End: 2020-09-24
Payer: COMMERCIAL

## 2020-09-24 DIAGNOSIS — S52.021G OLECRANON FRACTURE, RIGHT, CLOSED, WITH DELAYED HEALING, SUBSEQUENT ENCOUNTER: ICD-10-CM

## 2020-09-24 DIAGNOSIS — M25.521 RIGHT ELBOW PAIN: Primary | ICD-10-CM

## 2020-09-24 DIAGNOSIS — Z09 FRACTURE FOLLOW-UP: ICD-10-CM

## 2020-09-24 PROCEDURE — 97112 NEUROMUSCULAR REEDUCATION: CPT | Performed by: PHYSICAL THERAPIST

## 2020-09-24 PROCEDURE — 97110 THERAPEUTIC EXERCISES: CPT | Performed by: PHYSICAL THERAPIST

## 2020-09-24 PROCEDURE — 97140 MANUAL THERAPY 1/> REGIONS: CPT | Performed by: PHYSICAL THERAPIST

## 2020-09-24 NOTE — PROGRESS NOTES
Daily Note     Today's date: 2020  Patient name: Jennifer Pratt  : 1955  MRN: [id number]  Referring provider: Frandy Parra  Dx:   Encounter Diagnosis     ICD-10-CM    1  Right elbow pain  M25 521    2  Fracture follow-up  Z09    3  Olecranon fracture, right, closed, with delayed healing, subsequent encounter  S52 021G        Start Time: 1745  Stop Time: 1830  Total time in clinic (min): 45 minutes    Subjective: Pt reports improvements in symptoms       Objective: See treatment diary below    Olcreanon: 26 cm  Styloid process: 16 cm   Figure 8: 40 cm         Assessment: Tolerated treatment well  Improvements are demonstrated in edema measurements  Pt also demonstrates increase elbow extension  Patient demonstrated fatigue post treatment and would benefit from continued PT      Plan: Continue per plan of care        Precautions: HTN       Manuals             PT assisted light physiologic mobilization  HH            STM for edema  JF                                      Neuro Re-Ed                                                                                                        Ther Ex 9/15 9/24           Supination pronation within pain free range HEP            PROM elbow flexion HEP HH           Towel squeeze wrist  HEP            AROM wrist w/weight  0# 2x10 flexion/ext/raidal + ulnar dev           AAROM cane elbow flexion b/l  2*10           Pendullums  10'           Scap retracts  2*10*5"                        Ther Activity                                       Gait Training                                       Modalities

## 2020-09-28 ENCOUNTER — APPOINTMENT (OUTPATIENT)
Dept: PHYSICAL THERAPY | Age: 65
End: 2020-09-28
Payer: COMMERCIAL

## 2020-09-29 ENCOUNTER — OFFICE VISIT (OUTPATIENT)
Dept: PHYSICAL THERAPY | Age: 65
End: 2020-09-29
Payer: COMMERCIAL

## 2020-09-29 DIAGNOSIS — M25.521 RIGHT ELBOW PAIN: ICD-10-CM

## 2020-09-29 DIAGNOSIS — S52.021G OLECRANON FRACTURE, RIGHT, CLOSED, WITH DELAYED HEALING, SUBSEQUENT ENCOUNTER: Primary | ICD-10-CM

## 2020-09-29 PROCEDURE — 97140 MANUAL THERAPY 1/> REGIONS: CPT | Performed by: PHYSICAL THERAPIST

## 2020-09-29 PROCEDURE — 97530 THERAPEUTIC ACTIVITIES: CPT | Performed by: PHYSICAL THERAPIST

## 2020-09-29 PROCEDURE — 97110 THERAPEUTIC EXERCISES: CPT | Performed by: PHYSICAL THERAPIST

## 2020-09-29 NOTE — PROGRESS NOTES
Daily Note     Today's date: 2020  Patient name: Tiara Turner  : 1955  MRN: [id number]  Referring provider: Destiny Kauffman  Dx:   Encounter Diagnosis     ICD-10-CM    1  Olecranon fracture, right, closed, with delayed healing, subsequent encounter  S52 021G    2  Right elbow pain  M25 521        Start Time: 1530  Stop Time: 1625  Total time in clinic (min): 55 minutes    Subjective: Pt reports she is now able to write with her right hand, and continues to see improvement in her swelling and stiffness especially in extension  She reports continued daily implementation of her HEP, which is evident by her improvements in wrist function and maintained gains in extension ROM  Objective: See treatment diary below     Strength:  R: 33lbs  L: 53lbs    168 degrees extension  95 degrees flexion     swellincm figure 8  16cm wrist  26cm elbow    Assessment: Continued emphasis on therapist assisting PROM and manuals taken in order to maintain and continue progressing elbow ROM  Pt continues to be stiff/painful with light overpressure into flexion, and exhibits quicker gains in extension ROM  Pt Tolerated treatment well  Pt progressed to weighted (1lb) wrist extension and flexion exercises  Patient exhibited good technique with therapeutic exercises  STM for edema no longer indicated at this time  Plan: Progress treatment as tolerated  Precautions: HTN     Session was completed by OZZY Wiggins, with 100% supervision by PT         Manuals           PT assisted light physiologic mobilization  [facility]          STM for edema  JF                                      Neuro Re-Ed                                                                                                        Ther Ex 9/15 9/24 9/29          Supination pronation within pain free range HEP            PROM elbow flexion HEP HH           Towel squeeze wrist  HEP  HEP          AROM wrist w/weight  0# 2x10 flexion/ext/raidal + ulnar dev 1# 2x10 into flexion/ext, 0# into radial/ulnar dev          AAROM cane elbow flexion b/l  2*10 2*10          Pendullums  10'           Scap retracts  2*10*5" 2*10*5                       Ther Activity                                       Gait Training                                       Modalities

## 2020-10-01 ENCOUNTER — OFFICE VISIT (OUTPATIENT)
Dept: PHYSICAL THERAPY | Age: 65
End: 2020-10-01
Payer: COMMERCIAL

## 2020-10-01 DIAGNOSIS — Z09 FRACTURE FOLLOW-UP: ICD-10-CM

## 2020-10-01 DIAGNOSIS — M25.521 RIGHT ELBOW PAIN: Primary | ICD-10-CM

## 2020-10-01 DIAGNOSIS — S52.021G OLECRANON FRACTURE, RIGHT, CLOSED, WITH DELAYED HEALING, SUBSEQUENT ENCOUNTER: ICD-10-CM

## 2020-10-01 PROCEDURE — 97140 MANUAL THERAPY 1/> REGIONS: CPT | Performed by: PHYSICAL THERAPIST

## 2020-10-01 PROCEDURE — 97110 THERAPEUTIC EXERCISES: CPT | Performed by: PHYSICAL THERAPIST

## 2020-10-05 ENCOUNTER — OFFICE VISIT (OUTPATIENT)
Dept: PHYSICAL THERAPY | Age: 65
End: 2020-10-05
Payer: COMMERCIAL

## 2020-10-05 DIAGNOSIS — M25.521 RIGHT ELBOW PAIN: Primary | ICD-10-CM

## 2020-10-05 DIAGNOSIS — S52.021G OLECRANON FRACTURE, RIGHT, CLOSED, WITH DELAYED HEALING, SUBSEQUENT ENCOUNTER: ICD-10-CM

## 2020-10-05 DIAGNOSIS — Z09 FRACTURE FOLLOW-UP: ICD-10-CM

## 2020-10-05 PROCEDURE — 97110 THERAPEUTIC EXERCISES: CPT

## 2020-10-05 PROCEDURE — 97140 MANUAL THERAPY 1/> REGIONS: CPT

## 2020-10-08 ENCOUNTER — OFFICE VISIT (OUTPATIENT)
Dept: OBGYN CLINIC | Facility: HOSPITAL | Age: 65
End: 2020-10-08

## 2020-10-08 ENCOUNTER — OFFICE VISIT (OUTPATIENT)
Dept: PHYSICAL THERAPY | Age: 65
End: 2020-10-08
Payer: COMMERCIAL

## 2020-10-08 ENCOUNTER — HOSPITAL ENCOUNTER (OUTPATIENT)
Dept: RADIOLOGY | Facility: HOSPITAL | Age: 65
Discharge: HOME | End: 2020-10-08
Attending: ORTHOPAEDIC SURGERY
Payer: COMMERCIAL

## 2020-10-08 VITALS
SYSTOLIC BLOOD PRESSURE: 159 MMHG | HEART RATE: 79 BPM | DIASTOLIC BLOOD PRESSURE: 84 MMHG | BODY MASS INDEX: 25.66 KG/M2 | HEIGHT: 66 IN

## 2020-10-08 DIAGNOSIS — M25.521 RIGHT ELBOW PAIN: Primary | ICD-10-CM

## 2020-10-08 DIAGNOSIS — Z09 FRACTURE FOLLOW-UP: ICD-10-CM

## 2020-10-08 DIAGNOSIS — Z48.89 AFTERCARE FOLLOWING SURGERY: Primary | ICD-10-CM

## 2020-10-08 DIAGNOSIS — S52.021G OLECRANON FRACTURE, RIGHT, CLOSED, WITH DELAYED HEALING, SUBSEQUENT ENCOUNTER: ICD-10-CM

## 2020-10-08 DIAGNOSIS — Z48.89 AFTERCARE FOLLOWING SURGERY: ICD-10-CM

## 2020-10-08 PROCEDURE — 97110 THERAPEUTIC EXERCISES: CPT

## 2020-10-08 PROCEDURE — 99024 POSTOP FOLLOW-UP VISIT: CPT | Performed by: ORTHOPAEDIC SURGERY

## 2020-10-08 PROCEDURE — 73070 X-RAY EXAM OF ELBOW: CPT

## 2020-10-08 PROCEDURE — 97140 MANUAL THERAPY 1/> REGIONS: CPT

## 2020-10-09 ENCOUNTER — APPOINTMENT (OUTPATIENT)
Dept: PHYSICAL THERAPY | Age: 65
End: 2020-10-09
Payer: COMMERCIAL

## 2020-10-12 ENCOUNTER — OFFICE VISIT (OUTPATIENT)
Dept: PHYSICAL THERAPY | Age: 65
End: 2020-10-12
Payer: COMMERCIAL

## 2020-10-12 DIAGNOSIS — Z09 FRACTURE FOLLOW-UP: ICD-10-CM

## 2020-10-12 DIAGNOSIS — M25.521 RIGHT ELBOW PAIN: Primary | ICD-10-CM

## 2020-10-12 DIAGNOSIS — S52.021G OLECRANON FRACTURE, RIGHT, CLOSED, WITH DELAYED HEALING, SUBSEQUENT ENCOUNTER: ICD-10-CM

## 2020-10-12 PROCEDURE — 97110 THERAPEUTIC EXERCISES: CPT | Performed by: PHYSICAL THERAPIST

## 2020-10-12 PROCEDURE — 97140 MANUAL THERAPY 1/> REGIONS: CPT | Performed by: PHYSICAL THERAPIST

## 2020-10-15 ENCOUNTER — OFFICE VISIT (OUTPATIENT)
Dept: PHYSICAL THERAPY | Age: 65
End: 2020-10-15
Payer: COMMERCIAL

## 2020-10-15 DIAGNOSIS — S52.021G OLECRANON FRACTURE, RIGHT, CLOSED, WITH DELAYED HEALING, SUBSEQUENT ENCOUNTER: Primary | ICD-10-CM

## 2020-10-15 PROCEDURE — 97110 THERAPEUTIC EXERCISES: CPT | Performed by: PHYSICAL THERAPIST

## 2020-10-15 PROCEDURE — 97140 MANUAL THERAPY 1/> REGIONS: CPT | Performed by: PHYSICAL THERAPIST

## 2020-10-19 ENCOUNTER — OFFICE VISIT (OUTPATIENT)
Dept: PHYSICAL THERAPY | Age: 65
End: 2020-10-19
Payer: COMMERCIAL

## 2020-10-19 DIAGNOSIS — S52.021G OLECRANON FRACTURE, RIGHT, CLOSED, WITH DELAYED HEALING, SUBSEQUENT ENCOUNTER: Primary | ICD-10-CM

## 2020-10-19 PROCEDURE — 97110 THERAPEUTIC EXERCISES: CPT | Performed by: PHYSICAL THERAPIST

## 2020-10-19 PROCEDURE — 97140 MANUAL THERAPY 1/> REGIONS: CPT | Performed by: PHYSICAL THERAPIST

## 2020-10-22 ENCOUNTER — EVALUATION (OUTPATIENT)
Dept: PHYSICAL THERAPY | Age: 65
End: 2020-10-22
Payer: COMMERCIAL

## 2020-10-22 DIAGNOSIS — M25.521 RIGHT ELBOW PAIN: ICD-10-CM

## 2020-10-22 DIAGNOSIS — S52.021G OLECRANON FRACTURE, RIGHT, CLOSED, WITH DELAYED HEALING, SUBSEQUENT ENCOUNTER: Primary | ICD-10-CM

## 2020-10-22 DIAGNOSIS — Z09 FRACTURE FOLLOW-UP: ICD-10-CM

## 2020-10-22 PROCEDURE — 97110 THERAPEUTIC EXERCISES: CPT | Performed by: PHYSICAL THERAPIST

## 2020-10-22 PROCEDURE — 97140 MANUAL THERAPY 1/> REGIONS: CPT | Performed by: PHYSICAL THERAPIST

## 2020-10-26 ENCOUNTER — OFFICE VISIT (OUTPATIENT)
Dept: PHYSICAL THERAPY | Age: 65
End: 2020-10-26
Payer: COMMERCIAL

## 2020-10-26 DIAGNOSIS — S52.021G OLECRANON FRACTURE, RIGHT, CLOSED, WITH DELAYED HEALING, SUBSEQUENT ENCOUNTER: Primary | ICD-10-CM

## 2020-10-26 PROCEDURE — 97110 THERAPEUTIC EXERCISES: CPT | Performed by: PHYSICAL THERAPIST

## 2020-10-26 PROCEDURE — 97140 MANUAL THERAPY 1/> REGIONS: CPT | Performed by: PHYSICAL THERAPIST

## 2020-10-29 ENCOUNTER — OFFICE VISIT (OUTPATIENT)
Dept: PHYSICAL THERAPY | Age: 65
End: 2020-10-29
Payer: COMMERCIAL

## 2020-10-29 DIAGNOSIS — Z48.89 AFTERCARE FOLLOWING SURGERY: Primary | ICD-10-CM

## 2020-10-29 DIAGNOSIS — M25.521 RIGHT ELBOW PAIN: ICD-10-CM

## 2020-10-29 DIAGNOSIS — S52.021G OLECRANON FRACTURE, RIGHT, CLOSED, WITH DELAYED HEALING, SUBSEQUENT ENCOUNTER: Primary | ICD-10-CM

## 2020-10-29 PROCEDURE — 97110 THERAPEUTIC EXERCISES: CPT | Performed by: PHYSICAL THERAPIST

## 2020-10-29 PROCEDURE — 97140 MANUAL THERAPY 1/> REGIONS: CPT | Performed by: PHYSICAL THERAPIST

## 2020-11-02 ENCOUNTER — OFFICE VISIT (OUTPATIENT)
Dept: PHYSICAL THERAPY | Age: 65
End: 2020-11-02
Payer: COMMERCIAL

## 2020-11-02 DIAGNOSIS — S52.021G OLECRANON FRACTURE, RIGHT, CLOSED, WITH DELAYED HEALING, SUBSEQUENT ENCOUNTER: Primary | ICD-10-CM

## 2020-11-02 PROCEDURE — 97140 MANUAL THERAPY 1/> REGIONS: CPT | Performed by: PHYSICAL THERAPIST

## 2020-11-02 PROCEDURE — 97110 THERAPEUTIC EXERCISES: CPT | Performed by: PHYSICAL THERAPIST

## 2020-11-05 ENCOUNTER — OFFICE VISIT (OUTPATIENT)
Dept: OBGYN CLINIC | Facility: HOSPITAL | Age: 65
End: 2020-11-05

## 2020-11-05 ENCOUNTER — OFFICE VISIT (OUTPATIENT)
Dept: PHYSICAL THERAPY | Age: 65
End: 2020-11-05
Payer: COMMERCIAL

## 2020-11-05 ENCOUNTER — HOSPITAL ENCOUNTER (OUTPATIENT)
Dept: RADIOLOGY | Facility: HOSPITAL | Age: 65
Discharge: HOME | End: 2020-11-05
Attending: ORTHOPAEDIC SURGERY
Payer: COMMERCIAL

## 2020-11-05 VITALS
HEART RATE: 77 BPM | DIASTOLIC BLOOD PRESSURE: 95 MMHG | HEIGHT: 66 IN | SYSTOLIC BLOOD PRESSURE: 156 MMHG | BODY MASS INDEX: 25.66 KG/M2

## 2020-11-05 DIAGNOSIS — Z09 FRACTURE FOLLOW-UP: ICD-10-CM

## 2020-11-05 DIAGNOSIS — Z48.89 AFTERCARE FOLLOWING SURGERY: Primary | ICD-10-CM

## 2020-11-05 DIAGNOSIS — S52.021G OLECRANON FRACTURE, RIGHT, CLOSED, WITH DELAYED HEALING, SUBSEQUENT ENCOUNTER: Primary | ICD-10-CM

## 2020-11-05 DIAGNOSIS — Z48.89 AFTERCARE FOLLOWING SURGERY: ICD-10-CM

## 2020-11-05 DIAGNOSIS — M25.521 RIGHT ELBOW PAIN: ICD-10-CM

## 2020-11-05 PROCEDURE — 73070 X-RAY EXAM OF ELBOW: CPT

## 2020-11-05 PROCEDURE — 97140 MANUAL THERAPY 1/> REGIONS: CPT | Performed by: PHYSICAL THERAPIST

## 2020-11-05 PROCEDURE — 97110 THERAPEUTIC EXERCISES: CPT | Performed by: PHYSICAL THERAPIST

## 2020-11-05 PROCEDURE — 99024 POSTOP FOLLOW-UP VISIT: CPT | Performed by: ORTHOPAEDIC SURGERY

## 2020-11-09 ENCOUNTER — OFFICE VISIT (OUTPATIENT)
Dept: PHYSICAL THERAPY | Age: 65
End: 2020-11-09
Payer: COMMERCIAL

## 2020-11-09 DIAGNOSIS — Z09 FRACTURE FOLLOW-UP: ICD-10-CM

## 2020-11-09 DIAGNOSIS — M25.521 RIGHT ELBOW PAIN: ICD-10-CM

## 2020-11-09 DIAGNOSIS — S52.021G OLECRANON FRACTURE, RIGHT, CLOSED, WITH DELAYED HEALING, SUBSEQUENT ENCOUNTER: Primary | ICD-10-CM

## 2020-11-09 PROCEDURE — 97110 THERAPEUTIC EXERCISES: CPT | Performed by: PHYSICAL THERAPIST

## 2020-11-09 PROCEDURE — 97112 NEUROMUSCULAR REEDUCATION: CPT | Performed by: PHYSICAL THERAPIST

## 2020-11-09 PROCEDURE — 97140 MANUAL THERAPY 1/> REGIONS: CPT | Performed by: PHYSICAL THERAPIST

## 2020-11-12 ENCOUNTER — OFFICE VISIT (OUTPATIENT)
Dept: PHYSICAL THERAPY | Age: 65
End: 2020-11-12
Payer: COMMERCIAL

## 2020-11-12 DIAGNOSIS — Z09 FRACTURE FOLLOW-UP: ICD-10-CM

## 2020-11-12 DIAGNOSIS — S52.021G OLECRANON FRACTURE, RIGHT, CLOSED, WITH DELAYED HEALING, SUBSEQUENT ENCOUNTER: Primary | ICD-10-CM

## 2020-11-12 DIAGNOSIS — M25.521 RIGHT ELBOW PAIN: ICD-10-CM

## 2020-11-12 PROCEDURE — 97140 MANUAL THERAPY 1/> REGIONS: CPT

## 2020-11-12 PROCEDURE — 97110 THERAPEUTIC EXERCISES: CPT

## 2020-11-12 PROCEDURE — 97112 NEUROMUSCULAR REEDUCATION: CPT

## 2020-11-16 ENCOUNTER — OFFICE VISIT (OUTPATIENT)
Dept: PHYSICAL THERAPY | Age: 65
End: 2020-11-16
Payer: COMMERCIAL

## 2020-11-16 DIAGNOSIS — Z09 FRACTURE FOLLOW-UP: ICD-10-CM

## 2020-11-16 DIAGNOSIS — M25.521 RIGHT ELBOW PAIN: ICD-10-CM

## 2020-11-16 DIAGNOSIS — S52.021G OLECRANON FRACTURE, RIGHT, CLOSED, WITH DELAYED HEALING, SUBSEQUENT ENCOUNTER: Primary | ICD-10-CM

## 2020-11-16 PROCEDURE — 97140 MANUAL THERAPY 1/> REGIONS: CPT | Performed by: PHYSICAL THERAPIST

## 2020-11-16 PROCEDURE — 97112 NEUROMUSCULAR REEDUCATION: CPT | Performed by: PHYSICAL THERAPIST

## 2020-11-16 PROCEDURE — 97110 THERAPEUTIC EXERCISES: CPT | Performed by: PHYSICAL THERAPIST

## 2020-11-19 ENCOUNTER — OFFICE VISIT (OUTPATIENT)
Dept: PHYSICAL THERAPY | Age: 65
End: 2020-11-19
Payer: COMMERCIAL

## 2020-11-19 DIAGNOSIS — Z09 FRACTURE FOLLOW-UP: ICD-10-CM

## 2020-11-19 DIAGNOSIS — S52.021G OLECRANON FRACTURE, RIGHT, CLOSED, WITH DELAYED HEALING, SUBSEQUENT ENCOUNTER: Primary | ICD-10-CM

## 2020-11-19 DIAGNOSIS — M25.521 RIGHT ELBOW PAIN: ICD-10-CM

## 2020-11-19 PROCEDURE — 97110 THERAPEUTIC EXERCISES: CPT

## 2020-11-23 ENCOUNTER — APPOINTMENT (OUTPATIENT)
Dept: PHYSICAL THERAPY | Age: 65
End: 2020-11-23
Payer: COMMERCIAL

## 2020-11-25 ENCOUNTER — OFFICE VISIT (OUTPATIENT)
Dept: PHYSICAL THERAPY | Age: 65
End: 2020-11-25
Payer: COMMERCIAL

## 2020-11-25 DIAGNOSIS — Z09 FRACTURE FOLLOW-UP: ICD-10-CM

## 2020-11-25 DIAGNOSIS — S52.021G OLECRANON FRACTURE, RIGHT, CLOSED, WITH DELAYED HEALING, SUBSEQUENT ENCOUNTER: Primary | ICD-10-CM

## 2020-11-25 DIAGNOSIS — M25.521 RIGHT ELBOW PAIN: ICD-10-CM

## 2020-11-25 PROCEDURE — 97110 THERAPEUTIC EXERCISES: CPT | Performed by: PHYSICAL THERAPIST

## 2020-11-25 PROCEDURE — 97140 MANUAL THERAPY 1/> REGIONS: CPT | Performed by: PHYSICAL THERAPIST

## 2020-11-27 ENCOUNTER — OFFICE VISIT (OUTPATIENT)
Dept: PHYSICAL THERAPY | Age: 65
End: 2020-11-27
Payer: COMMERCIAL

## 2020-11-27 DIAGNOSIS — S52.021G OLECRANON FRACTURE, RIGHT, CLOSED, WITH DELAYED HEALING, SUBSEQUENT ENCOUNTER: ICD-10-CM

## 2020-11-27 DIAGNOSIS — M25.521 RIGHT ELBOW PAIN: Primary | ICD-10-CM

## 2020-11-27 DIAGNOSIS — Z09 FRACTURE FOLLOW-UP: ICD-10-CM

## 2020-11-27 PROCEDURE — 97110 THERAPEUTIC EXERCISES: CPT | Performed by: PHYSICAL THERAPIST

## 2020-11-27 PROCEDURE — 97112 NEUROMUSCULAR REEDUCATION: CPT | Performed by: PHYSICAL THERAPIST

## 2020-11-27 PROCEDURE — 97140 MANUAL THERAPY 1/> REGIONS: CPT | Performed by: PHYSICAL THERAPIST

## 2020-11-30 ENCOUNTER — OFFICE VISIT (OUTPATIENT)
Dept: PHYSICAL THERAPY | Age: 65
End: 2020-11-30
Payer: COMMERCIAL

## 2020-11-30 DIAGNOSIS — M25.521 RIGHT ELBOW PAIN: Primary | ICD-10-CM

## 2020-11-30 DIAGNOSIS — S52.021G OLECRANON FRACTURE, RIGHT, CLOSED, WITH DELAYED HEALING, SUBSEQUENT ENCOUNTER: ICD-10-CM

## 2020-11-30 DIAGNOSIS — Z09 FRACTURE FOLLOW-UP: ICD-10-CM

## 2020-11-30 PROCEDURE — 97110 THERAPEUTIC EXERCISES: CPT | Performed by: PHYSICAL THERAPIST

## 2020-11-30 PROCEDURE — 97140 MANUAL THERAPY 1/> REGIONS: CPT | Performed by: PHYSICAL THERAPIST

## 2020-12-03 ENCOUNTER — OFFICE VISIT (OUTPATIENT)
Dept: PHYSICAL THERAPY | Age: 65
End: 2020-12-03
Payer: COMMERCIAL

## 2020-12-03 DIAGNOSIS — M25.521 RIGHT ELBOW PAIN: Primary | ICD-10-CM

## 2020-12-03 DIAGNOSIS — Z09 FRACTURE FOLLOW-UP: ICD-10-CM

## 2020-12-03 DIAGNOSIS — S52.021G OLECRANON FRACTURE, RIGHT, CLOSED, WITH DELAYED HEALING, SUBSEQUENT ENCOUNTER: ICD-10-CM

## 2020-12-03 PROCEDURE — 97140 MANUAL THERAPY 1/> REGIONS: CPT | Performed by: PHYSICAL THERAPIST

## 2020-12-03 PROCEDURE — 97110 THERAPEUTIC EXERCISES: CPT | Performed by: PHYSICAL THERAPIST

## 2020-12-08 ENCOUNTER — OFFICE VISIT (OUTPATIENT)
Dept: OBGYN CLINIC | Facility: HOSPITAL | Age: 65
End: 2020-12-08
Payer: COMMERCIAL

## 2020-12-08 ENCOUNTER — HOSPITAL ENCOUNTER (OUTPATIENT)
Dept: RADIOLOGY | Facility: HOSPITAL | Age: 65
Discharge: HOME | End: 2020-12-08
Attending: ORTHOPAEDIC SURGERY
Payer: COMMERCIAL

## 2020-12-08 ENCOUNTER — OFFICE VISIT (OUTPATIENT)
Dept: PHYSICAL THERAPY | Age: 65
End: 2020-12-08
Payer: COMMERCIAL

## 2020-12-08 VITALS
BODY MASS INDEX: 25.66 KG/M2 | HEART RATE: 68 BPM | SYSTOLIC BLOOD PRESSURE: 152 MMHG | HEIGHT: 66 IN | DIASTOLIC BLOOD PRESSURE: 91 MMHG

## 2020-12-08 DIAGNOSIS — S52.021G OLECRANON FRACTURE, RIGHT, CLOSED, WITH DELAYED HEALING, SUBSEQUENT ENCOUNTER: ICD-10-CM

## 2020-12-08 DIAGNOSIS — Z09 FRACTURE FOLLOW-UP: ICD-10-CM

## 2020-12-08 DIAGNOSIS — Z48.89 AFTERCARE FOLLOWING SURGERY: ICD-10-CM

## 2020-12-08 DIAGNOSIS — S52.021D CLOSED FRACTURE OF OLECRANON PROCESS OF RIGHT ULNA WITH ROUTINE HEALING, SUBSEQUENT ENCOUNTER: ICD-10-CM

## 2020-12-08 DIAGNOSIS — Z48.89 AFTERCARE FOLLOWING SURGERY: Primary | ICD-10-CM

## 2020-12-08 DIAGNOSIS — M25.521 RIGHT ELBOW PAIN: Primary | ICD-10-CM

## 2020-12-08 PROCEDURE — 73070 X-RAY EXAM OF ELBOW: CPT

## 2020-12-08 PROCEDURE — 3080F DIAST BP >= 90 MM HG: CPT | Performed by: ORTHOPAEDIC SURGERY

## 2020-12-08 PROCEDURE — 97140 MANUAL THERAPY 1/> REGIONS: CPT | Performed by: PHYSICAL THERAPIST

## 2020-12-08 PROCEDURE — 97112 NEUROMUSCULAR REEDUCATION: CPT | Performed by: PHYSICAL THERAPIST

## 2020-12-08 PROCEDURE — 3077F SYST BP >= 140 MM HG: CPT | Performed by: ORTHOPAEDIC SURGERY

## 2020-12-08 PROCEDURE — 99212 OFFICE O/P EST SF 10 MIN: CPT | Performed by: ORTHOPAEDIC SURGERY

## 2020-12-08 PROCEDURE — 97110 THERAPEUTIC EXERCISES: CPT | Performed by: PHYSICAL THERAPIST

## 2020-12-10 ENCOUNTER — OFFICE VISIT (OUTPATIENT)
Dept: PHYSICAL THERAPY | Age: 65
End: 2020-12-10
Payer: COMMERCIAL

## 2020-12-10 DIAGNOSIS — Z09 FRACTURE FOLLOW-UP: ICD-10-CM

## 2020-12-10 DIAGNOSIS — S52.021G OLECRANON FRACTURE, RIGHT, CLOSED, WITH DELAYED HEALING, SUBSEQUENT ENCOUNTER: ICD-10-CM

## 2020-12-10 DIAGNOSIS — M25.521 RIGHT ELBOW PAIN: Primary | ICD-10-CM

## 2020-12-10 PROCEDURE — 97112 NEUROMUSCULAR REEDUCATION: CPT | Performed by: PHYSICAL THERAPIST

## 2020-12-10 PROCEDURE — 97110 THERAPEUTIC EXERCISES: CPT | Performed by: PHYSICAL THERAPIST

## 2020-12-10 PROCEDURE — 97140 MANUAL THERAPY 1/> REGIONS: CPT | Performed by: PHYSICAL THERAPIST

## 2020-12-14 ENCOUNTER — OFFICE VISIT (OUTPATIENT)
Dept: PHYSICAL THERAPY | Age: 65
End: 2020-12-14
Payer: COMMERCIAL

## 2020-12-14 DIAGNOSIS — M25.521 RIGHT ELBOW PAIN: Primary | ICD-10-CM

## 2020-12-14 DIAGNOSIS — S52.021G OLECRANON FRACTURE, RIGHT, CLOSED, WITH DELAYED HEALING, SUBSEQUENT ENCOUNTER: ICD-10-CM

## 2020-12-14 DIAGNOSIS — Z09 FRACTURE FOLLOW-UP: ICD-10-CM

## 2020-12-14 PROCEDURE — 97140 MANUAL THERAPY 1/> REGIONS: CPT

## 2020-12-14 PROCEDURE — 97110 THERAPEUTIC EXERCISES: CPT

## 2020-12-17 ENCOUNTER — OFFICE VISIT (OUTPATIENT)
Dept: PHYSICAL THERAPY | Age: 65
End: 2020-12-17
Payer: COMMERCIAL

## 2020-12-17 DIAGNOSIS — S52.021G OLECRANON FRACTURE, RIGHT, CLOSED, WITH DELAYED HEALING, SUBSEQUENT ENCOUNTER: ICD-10-CM

## 2020-12-17 DIAGNOSIS — Z09 FRACTURE FOLLOW-UP: ICD-10-CM

## 2020-12-17 DIAGNOSIS — M25.521 RIGHT ELBOW PAIN: Primary | ICD-10-CM

## 2020-12-17 PROCEDURE — 97112 NEUROMUSCULAR REEDUCATION: CPT | Performed by: PHYSICAL THERAPIST

## 2020-12-17 PROCEDURE — 97140 MANUAL THERAPY 1/> REGIONS: CPT | Performed by: PHYSICAL THERAPIST

## 2020-12-17 PROCEDURE — 97110 THERAPEUTIC EXERCISES: CPT | Performed by: PHYSICAL THERAPIST

## 2020-12-21 ENCOUNTER — OFFICE VISIT (OUTPATIENT)
Dept: PHYSICAL THERAPY | Age: 65
End: 2020-12-21
Payer: COMMERCIAL

## 2020-12-21 DIAGNOSIS — M25.521 RIGHT ELBOW PAIN: Primary | ICD-10-CM

## 2020-12-21 DIAGNOSIS — S52.021G OLECRANON FRACTURE, RIGHT, CLOSED, WITH DELAYED HEALING, SUBSEQUENT ENCOUNTER: ICD-10-CM

## 2020-12-21 DIAGNOSIS — Z09 FRACTURE FOLLOW-UP: ICD-10-CM

## 2020-12-21 PROCEDURE — 97112 NEUROMUSCULAR REEDUCATION: CPT | Performed by: PHYSICAL THERAPIST

## 2020-12-21 PROCEDURE — 97110 THERAPEUTIC EXERCISES: CPT | Performed by: PHYSICAL THERAPIST

## 2020-12-21 PROCEDURE — 97140 MANUAL THERAPY 1/> REGIONS: CPT | Performed by: PHYSICAL THERAPIST

## 2020-12-23 ENCOUNTER — OFFICE VISIT (OUTPATIENT)
Dept: PHYSICAL THERAPY | Age: 65
End: 2020-12-23
Payer: COMMERCIAL

## 2020-12-23 DIAGNOSIS — Z09 FRACTURE FOLLOW-UP: ICD-10-CM

## 2020-12-23 DIAGNOSIS — M25.521 RIGHT ELBOW PAIN: Primary | ICD-10-CM

## 2020-12-23 DIAGNOSIS — S52.021G OLECRANON FRACTURE, RIGHT, CLOSED, WITH DELAYED HEALING, SUBSEQUENT ENCOUNTER: ICD-10-CM

## 2020-12-23 PROCEDURE — 97112 NEUROMUSCULAR REEDUCATION: CPT

## 2020-12-23 PROCEDURE — 97140 MANUAL THERAPY 1/> REGIONS: CPT

## 2020-12-23 PROCEDURE — 97110 THERAPEUTIC EXERCISES: CPT

## 2020-12-29 ENCOUNTER — OFFICE VISIT (OUTPATIENT)
Dept: PHYSICAL THERAPY | Age: 65
End: 2020-12-29
Payer: COMMERCIAL

## 2020-12-29 DIAGNOSIS — Z09 FRACTURE FOLLOW-UP: ICD-10-CM

## 2020-12-29 DIAGNOSIS — S52.021G OLECRANON FRACTURE, RIGHT, CLOSED, WITH DELAYED HEALING, SUBSEQUENT ENCOUNTER: ICD-10-CM

## 2020-12-29 DIAGNOSIS — M25.521 RIGHT ELBOW PAIN: Primary | ICD-10-CM

## 2020-12-29 PROCEDURE — 97110 THERAPEUTIC EXERCISES: CPT | Performed by: PHYSICAL THERAPIST

## 2020-12-29 PROCEDURE — 97140 MANUAL THERAPY 1/> REGIONS: CPT | Performed by: PHYSICAL THERAPIST

## 2020-12-29 PROCEDURE — 97112 NEUROMUSCULAR REEDUCATION: CPT | Performed by: PHYSICAL THERAPIST

## 2020-12-31 ENCOUNTER — OFFICE VISIT (OUTPATIENT)
Dept: PHYSICAL THERAPY | Age: 65
End: 2020-12-31
Payer: COMMERCIAL

## 2020-12-31 DIAGNOSIS — M25.521 RIGHT ELBOW PAIN: Primary | ICD-10-CM

## 2020-12-31 DIAGNOSIS — S52.021G OLECRANON FRACTURE, RIGHT, CLOSED, WITH DELAYED HEALING, SUBSEQUENT ENCOUNTER: ICD-10-CM

## 2020-12-31 DIAGNOSIS — Z09 FRACTURE FOLLOW-UP: ICD-10-CM

## 2020-12-31 PROCEDURE — 97110 THERAPEUTIC EXERCISES: CPT | Performed by: PHYSICAL THERAPIST

## 2020-12-31 PROCEDURE — 97112 NEUROMUSCULAR REEDUCATION: CPT | Performed by: PHYSICAL THERAPIST

## 2020-12-31 PROCEDURE — 97140 MANUAL THERAPY 1/> REGIONS: CPT | Performed by: PHYSICAL THERAPIST

## 2021-01-04 ENCOUNTER — OFFICE VISIT (OUTPATIENT)
Dept: PHYSICAL THERAPY | Age: 66
End: 2021-01-04
Payer: COMMERCIAL

## 2021-01-04 DIAGNOSIS — Z09 FRACTURE FOLLOW-UP: ICD-10-CM

## 2021-01-04 DIAGNOSIS — S52.021G OLECRANON FRACTURE, RIGHT, CLOSED, WITH DELAYED HEALING, SUBSEQUENT ENCOUNTER: ICD-10-CM

## 2021-01-04 DIAGNOSIS — M25.521 RIGHT ELBOW PAIN: Primary | ICD-10-CM

## 2021-01-04 PROCEDURE — 97112 NEUROMUSCULAR REEDUCATION: CPT | Performed by: PHYSICAL THERAPIST

## 2021-01-04 PROCEDURE — 97140 MANUAL THERAPY 1/> REGIONS: CPT | Performed by: PHYSICAL THERAPIST

## 2021-01-04 PROCEDURE — 97110 THERAPEUTIC EXERCISES: CPT | Performed by: PHYSICAL THERAPIST

## 2021-01-04 NOTE — PROGRESS NOTES
Daily Note     Today's date: 2021  Patient name: Abbi Zapata  : 1955  MRN: [id number]  Referring provider: Mendez Leung  Dx:   Encounter Diagnosis     ICD-10-CM    1  Right elbow pain  M25 521    2  Fracture follow-up  Z09    3  Olecranon fracture, right, closed, with delayed healing, subsequent encounter  S52 021G        Start Time: 1700  Stop Time: 1745  Total time in clinic (min): 45 minutes    Subjective: Pt reports continued success with functional activities  Objective: See treatment diary below      Assessment: Tolerated treatment well  Pt's  Patient demonstrated fatigue post treatment and would benefit from continued PT      Plan: Continue per plan of care  Precautions: HTN         Manuals 20    PT assisted light physiologic mobilization         Flexion with PT distraction LISA GONZALEZ    PNF flexion/extension LISA GONZALEZ    Neuro Re-Ed 20    Supine shoulder flexion with TB for scapular depression        Elbow flexion and extension TB 3*10 rtb rtb 3x10 rtb 3x10 rtb 3x10    CC walk outs 2*10 12 5# 12 5# 2x10 15 2x10 15" 2x10    Seated R elbow flex/WB  10x10" 30"*5 30"x5    Ther Ex 20    Supination pronation within pain free range        PROM elbow flexion        Towel squeeze wrist         AROM wrist w/weight        AAROM cane elbow flexion b/l        Pendullums        Pulleys for ext and flexion  5' 5' 5'    LLPS elbow ext    5'    Arm bike 4/4' 4'/4' 4/4' 4/4'    Modified benny push up 3*10 High counter 3x10 High counter 3x10 High counter 3x10 + standing at wall    Elbow flexion, extension         Posterior ER stretch into elbow flexion        D1+ D2 bands Standing rtb 2x10 + d2 Standing rtb 2x10 ea  Standing rtb 2x10 ea    D1        AROM sup/pro        Ther Activity                        Gait Training                        Modalities

## 2021-01-07 ENCOUNTER — OFFICE VISIT (OUTPATIENT)
Dept: PHYSICAL THERAPY | Age: 66
End: 2021-01-07
Payer: COMMERCIAL

## 2021-01-07 DIAGNOSIS — Z09 FRACTURE FOLLOW-UP: ICD-10-CM

## 2021-01-07 DIAGNOSIS — M25.521 RIGHT ELBOW PAIN: Primary | ICD-10-CM

## 2021-01-07 DIAGNOSIS — S52.021G OLECRANON FRACTURE, RIGHT, CLOSED, WITH DELAYED HEALING, SUBSEQUENT ENCOUNTER: ICD-10-CM

## 2021-01-07 PROCEDURE — 97110 THERAPEUTIC EXERCISES: CPT

## 2021-01-07 PROCEDURE — 97112 NEUROMUSCULAR REEDUCATION: CPT

## 2021-01-07 NOTE — PROGRESS NOTES
Daily Note     Today's date: 2021  Patient name: Toni Oconnor  : 1955  MRN: [id number]  Referring provider: Stanislav Medina  Dx:   Encounter Diagnosis     ICD-10-CM    1  Right elbow pain  M25 521    2  Fracture follow-up  Z09    3  Olecranon fracture, right, closed, with delayed healing, subsequent encounter  S52 021G                   Subjective: pt reports she's able to hold her phone now with her R hand, pt reports "nodular type bumps" on garcia aspects of B hands      Objective: See treatment diary below      Assessment:     Plan: Continue per plan of care  Progress treatment as tolerated  Precautions: HTN         Manuals 20   PT assisted light physiologic mobilization         Flexion with PT distraction JF JF JF JF    PNF flexion/extension JF JF JF JF VK   Neuro Re-Ed 20   Supine shoulder flexion with TB for scapular depression        Elbow flexion and extension TB 3*10 rtb rtb 3x10 rtb 3x10 rtb 3x10 rtb 3x10   CC walk outs 2*10 12 5# 12 5# 2x10 15 2x10 15" 2x10 15# 2x10   Seated R elbow flex/WB  10x10" 30"*5 30"x5 5x30"   Ther Ex 20   Supination pronation within pain free range        PROM elbow flexion     VK   Towel squeeze wrist         AROM wrist w/weight        AAROM cane elbow flexion b/l        Pendullums        Pulleys for ext and flexion  5' 5' 5' 5'   LLPS elbow ext    5' 5'   Arm bike 4/4' 4'/4' 4/4' 4/4' 4'/4'   Modified benny push up 3*10 High counter 3x10 High counter 3x10 High counter 3x10 + standing at wall High counter 3x10   Elbow flexion, extension         Posterior ER stretch into elbow flexion        D1+ D2 bands Standing rtb 2x10 + d2 Standing rtb 2x10 ea  Standing rtb 2x10 ea Standing rtb 2x10 ea   D1        AROM sup/pro        Ther Activity                        Gait Training                        Modalities

## 2021-01-11 ENCOUNTER — OFFICE VISIT (OUTPATIENT)
Dept: PHYSICAL THERAPY | Age: 66
End: 2021-01-11
Payer: COMMERCIAL

## 2021-01-11 DIAGNOSIS — S52.021G OLECRANON FRACTURE, RIGHT, CLOSED, WITH DELAYED HEALING, SUBSEQUENT ENCOUNTER: ICD-10-CM

## 2021-01-11 DIAGNOSIS — Z09 FRACTURE FOLLOW-UP: ICD-10-CM

## 2021-01-11 DIAGNOSIS — M25.521 RIGHT ELBOW PAIN: Primary | ICD-10-CM

## 2021-01-11 PROCEDURE — 97112 NEUROMUSCULAR REEDUCATION: CPT | Performed by: PHYSICAL THERAPIST

## 2021-01-11 PROCEDURE — 97110 THERAPEUTIC EXERCISES: CPT | Performed by: PHYSICAL THERAPIST

## 2021-01-11 PROCEDURE — 97140 MANUAL THERAPY 1/> REGIONS: CPT | Performed by: PHYSICAL THERAPIST

## 2021-01-11 NOTE — PROGRESS NOTES
Daily Note     Today's date: 2021  Patient name: Toni Oconnor  : 1955  MRN: [id number]  Referring provider: Stanislav Medina  Dx:   Encounter Diagnosis     ICD-10-CM    1  Right elbow pain  M25 521    2  Fracture follow-up  Z09    3  Olecranon fracture, right, closed, with delayed healing, subsequent encounter  S52 021G                   Subjective: Pt reports improvements in symptoms  Objective: See treatment diary below      Assessment: Tolerated treatment well  Patient demonstrated fatigue post treatment and would benefit from continued PT      Plan: Continue per plan of care  Re-assessment next visit  Precautions: HTN         Manuals 20   PT assisted light physiologic mobilization         Flexion with PT distraction JF LISA GONZALEZ JF    PNF flexion/extension JF LISA GONZALEZ JF   Neuro Re-Ed 20   Supine shoulder flexion with TB for scapular depression        Elbow flexion and extension TB 3*10 rtb rtb 3x10 rtb 3x10 rtb 3x10 rtb 3x10   CC walk outs 2*10 12 5# 12 5# 2x10 15 2x10 15" 2x10 15# 2x10   Seated R elbow flex/WB  10x10" 30"*5 30"x5 5x30"   Ther Ex 20   Supination pronation within pain free range        PROM elbow flexion     JF   Towel squeeze wrist         AROM wrist w/weight        AAROM cane elbow flexion b/l        [place]        Pulleys for ext and flexion  5' 5' 5' 5'   LLPS elbow ext    5' 5'   Arm bike 4/4' 4'/4' 4/4' 4/4' 4'/4'   Modified benny push up 3*10 High counter 3x10 High counter 3x10 High counter 3x10 + standing at wall High counter 3x10   Elbow flexion, extension         Posterior ER stretch into elbow flexion        D1+ D2 bands Standing rtb 2x10 + d2 Standing rtb 2x10 ea  Standing rtb 2x10 ea Standing rtb 2x10 ea   D1        AROM sup/pro        Ther Activity                        Gait Training                        Modalities

## 2021-01-14 ENCOUNTER — EVALUATION (OUTPATIENT)
Dept: PHYSICAL THERAPY | Age: 66
End: 2021-01-14
Payer: COMMERCIAL

## 2021-01-14 DIAGNOSIS — Z09 FRACTURE FOLLOW-UP: ICD-10-CM

## 2021-01-14 DIAGNOSIS — S52.021G OLECRANON FRACTURE, RIGHT, CLOSED, WITH DELAYED HEALING, SUBSEQUENT ENCOUNTER: ICD-10-CM

## 2021-01-14 DIAGNOSIS — M25.521 RIGHT ELBOW PAIN: Primary | ICD-10-CM

## 2021-01-14 PROCEDURE — 97110 THERAPEUTIC EXERCISES: CPT | Performed by: PHYSICAL THERAPIST

## 2021-01-14 PROCEDURE — 97140 MANUAL THERAPY 1/> REGIONS: CPT | Performed by: PHYSICAL THERAPIST

## 2021-01-14 PROCEDURE — 97112 NEUROMUSCULAR REEDUCATION: CPT | Performed by: PHYSICAL THERAPIST

## 2021-01-15 NOTE — PROGRESS NOTES
Daily Note     Today's date: 12/3/2020  Patient name: Toni Oconnor  : 1955  MRN: [id number]  Referring provider: Stanislav Medina  Dx:   Encounter Diagnosis     ICD-10-CM    1  Right elbow pain  M25 521    2  Fracture follow-up  Z09    3  Olecranon fracture, right, closed, with delayed healing, subsequent encounter  S52 021G        Start Time: 1700  Stop Time: 1750  Total time in clinic (min): 50 minutes     Assessment  Assessment details: Pt is a 61 y/o female who presents with elbow pain following ORIF  Pt has had difficulty reaching functional range of motion at elbow  Patient has finally made it to 130 degrees of elbow flexion after focusing primarily on ROM interventions, but patient now need to develop strength in shoulder joint and elbow to be fully functional again  Pt would benefit from PT to address these impairments leading to increased functional capacity and improved quality of life  Impairments: abnormal or restricted ROM, impaired physical strength, lacks appropriate home exercise program, pain with function, poor posture  and poor body mechanics  Understanding of Dx/Px/POC: good   Prognosis: good    Subjective: Bethany Salamanca reports clicking in her shoulder, but otherwise no new change in symptoms  She feels 80% overall improvement, limited by strength limitations        Objective: See treatment diary below    Goals  Short Term Goals: to be achieved by 4 weeks  1) Patient to be independent with basic HEP  -MET  2) Decrease pain to 2/10 at its worst  -MET  3) Increase shoulder ROM by 5-10 degrees in all planes -MET  4) Increase shoulder strength by 1/2 MMT grade in all deficient planes  -MET    Long Term Goals: to be achieved by discharge  1) FOTO equal to or greater than 66   -MET  2) Patient to be independent with comprehensive HEP  -MET  3) Patient will demonstrate maximal over head reaching -MET  4) Increase UE strength to 5/5 MMT grade in all planes to improve a/iadls  -partially met  5) Patient to report no sleep interruption secondary to pain  -MET  6) Achieve 130 degrees of flexion to reach accepted functional capacity - Partially Met     Plan:    Patient would benefit from: skilled physical therapy  Planned modality interventions: cryotherapy and thermotherapy: hydrocollator packs  Planned therapy interventions: neuromuscular re-education, patient education, stretching, strengthening, therapeutic activities, therapeutic exercise, therapeutic training, home exercise program and graded activity  Frequency: Twice a week for 7 weeks  Treatment plan discussed with: patient        Subjective Evaluation        Pain  Current pain ratin  At best pain ratin  At worst pain ratin  Quality: sharp, radiating and dull ache  Aggravating factors: overhead activity, keyboarding and lifting    Hand dominance: right    Patient Goals  Patient goals for therapy: decreased pain, independence with ADLs/IADLs, return to sport/leisure activities, increased strength and increased motion          Objective     Active Range of Motion     Shoulder MMT:  4-/5 in all planes of motion  4-/5 in all plane of motion at elbow    Passive Range of Motion     Right Shoulder PROM  Flexion: 170 degrees   Abduction: 150 degrees     Right Elbow   Flexion: 130 degrees   Extension: 0 degrees     Additional Passive Range of Motion Details     strength:  Right: 50lbs  Left:55lbs      Assessment: Patient demonstrated fatigue post treatment and exhibited good technique with therapeutic exercises      Plan: Progress treatment as tolerated  Precautions: HTN         Manuals 20   PT assisted light physiologic mobilization         Flexion with PT distraction LISA GONZALEZ    PNF flexion/extension LISA GONZALEZ   Neuro Re-Ed 20   Supine shoulder flexion with TB for scapular depression        Elbow flexion and extension TB 3*10 rtb rtb 3x10 rtb 3x10 rtb 3x10 rtb 3x10   CC walk outs 2*10 12 5# 12 5# 2x10 15 2x10 15" 2x10 15# 2x10   Seated R elbow flex/WB  10x10" 30"*5 30"x5 5x30"   Ther Ex 12/21 12/23/20 12/31 1/4 1/14/21   Supination pronation within pain free range        PROM elbow flexion     JF   Towel squeeze wrist         AROM wrist w/weight        AAROM cane elbow flexion b/l        [place]        Pulleys for ext and flexion  5' 5' 5' 5'   LLPS elbow ext    5' 5'   Arm bike 4/4' 4'/4' 4/4' 4/4' 4'/4'   Modified benny push up 3*10 High counter 3x10 High counter 3x10 High counter 3x10 + standing at wall High counter 3x10   Elbow flexion, extension         Posterior ER stretch into elbow flexion        D1+ D2 bands Standing rtb 2x10 + d2 Standing rtb 2x10 ea  Standing rtb 2x10 ea Standing rtb 2x10 ea   D1        AROM sup/pro        Ther Activity                        Gait Training                        Modalities

## 2021-01-18 ENCOUNTER — OFFICE VISIT (OUTPATIENT)
Dept: PHYSICAL THERAPY | Age: 66
End: 2021-01-18
Payer: COMMERCIAL

## 2021-01-18 DIAGNOSIS — M25.521 RIGHT ELBOW PAIN: Primary | ICD-10-CM

## 2021-01-18 DIAGNOSIS — Z09 FRACTURE FOLLOW-UP: ICD-10-CM

## 2021-01-18 DIAGNOSIS — S52.021G OLECRANON FRACTURE, RIGHT, CLOSED, WITH DELAYED HEALING, SUBSEQUENT ENCOUNTER: ICD-10-CM

## 2021-01-18 PROCEDURE — 97140 MANUAL THERAPY 1/> REGIONS: CPT | Performed by: PHYSICAL THERAPIST

## 2021-01-18 PROCEDURE — 97112 NEUROMUSCULAR REEDUCATION: CPT | Performed by: PHYSICAL THERAPIST

## 2021-01-18 PROCEDURE — 97110 THERAPEUTIC EXERCISES: CPT | Performed by: PHYSICAL THERAPIST

## 2021-01-19 NOTE — PROGRESS NOTES
Daily Note     Today's date: 2021  Patient name: Ladarius Cole  : 1955  MRN: [id number]  Referring provider: Elba Pickett  Dx:   Encounter Diagnosis     ICD-10-CM    1  Right elbow pain  M25 521    2  Fracture follow-up  Z09    3  Olecranon fracture, right, closed, with delayed healing, subsequent encounter  S52 021G                   Subjective: Pt reports improvements in symptoms  Objective: See treatment diary below      Assessment: Tolerated treatment well  Patient demonstrated fatigue post treatment and would benefit from continued PT      Plan: Continue per plan of care  Precautions: HTN         Manuals     21   PT assisted light physiologic mobilization         Flexion with PT distraction        PNF flexion/extension     JF   Neuro Re-Ed     21   Supine shoulder flexion with TB for scapular depression        Elbow flexion and extension TB     rtb 3x10   CC walk outs     15# 2x10   Seated R elbow flex/WB     5x30"   Ther Ex     21   Supination pronation within pain free range        PROM elbow flexion     JF   Towel squeeze wrist         AROM wrist w/weight        AAROM cane elbow flexion b/l        [place]        Pulleys for ext and flexion     5'   LLPS elbow ext     5'   Arm bike     4'/4'   Modified benny push up     High counter 3x10   Elbow flexion, extension         Posterior ER stretch into elbow flexion        D1+ D2 bands Standing rtb 2x10 + d2 Standing rtb 2x10 ea  Standing rtb 2x10 ea Standing rtb 2x10 ea   D1        AROM sup/pro        Ther Activity                        Gait Training                        Modalities

## 2021-01-21 ENCOUNTER — OFFICE VISIT (OUTPATIENT)
Dept: PHYSICAL THERAPY | Age: 66
End: 2021-01-21
Payer: COMMERCIAL

## 2021-01-21 DIAGNOSIS — S52.021G OLECRANON FRACTURE, RIGHT, CLOSED, WITH DELAYED HEALING, SUBSEQUENT ENCOUNTER: ICD-10-CM

## 2021-01-21 DIAGNOSIS — Z09 FRACTURE FOLLOW-UP: ICD-10-CM

## 2021-01-21 DIAGNOSIS — M25.521 RIGHT ELBOW PAIN: Primary | ICD-10-CM

## 2021-01-21 PROCEDURE — 97110 THERAPEUTIC EXERCISES: CPT | Performed by: PHYSICAL THERAPIST

## 2021-01-21 PROCEDURE — 97140 MANUAL THERAPY 1/> REGIONS: CPT | Performed by: PHYSICAL THERAPIST

## 2021-01-21 PROCEDURE — 97112 NEUROMUSCULAR REEDUCATION: CPT | Performed by: PHYSICAL THERAPIST

## 2021-01-21 NOTE — PROGRESS NOTES
Daily Note     Today's date: 2021  Patient name: Claudia Ocampo  : 1955  MRN: [id number]  Referring provider: Brandi Castro  Dx:   Encounter Diagnosis     ICD-10-CM    1  Right elbow pain  M25 521    2  Fracture follow-up  Z09    3  Olecranon fracture, right, closed, with delayed healing, subsequent encounter  S52 021G        Start Time: 1700  Stop Time: 1745  Total time in clinic (min): 45 minutes     Subjective: Pt reports improvements with symptoms  Objective: See treatment diary below      Assessment: Tolerated treatment well  Patient demonstrated fatigue post treatment and would benefit from continued PT      Plan: Continue per plan of care  Precautions: HTN         Manuals     21   PT assisted light physiologic mobilization         Flexion with PT distraction        PNF flexion/extension     JF   Neuro Re-Ed     21   Supine shoulder flexion with TB for scapular depression        Elbow flexion and extension TB     rtb 3x10   CC walk outs     15# 2x10   Seated R elbow flex/WB     5x30"   Ther Ex     21   Supination pronation within pain free range        PROM elbow flexion     JF   Towel squeeze wrist         AROM wrist w/weight        AAROM cane elbow flexion b/l        [place]        Pulleys for ext and flexion     5'   LLPS elbow ext     5'   Arm bike     4'/4'   Modified benny push up     High counter 3x10   Elbow flexion, extension         Posterior ER stretch into elbow flexion        D1+ D2 bands Standing rtb 2x10 + d2 Standing rtb 2x10 ea  Standing rtb 2x10 ea Standing rtb 2x10 ea   D1        AROM sup/pro        Ther Activity                        Gait Training                        Modalities

## 2021-01-25 ENCOUNTER — OFFICE VISIT (OUTPATIENT)
Dept: PHYSICAL THERAPY | Age: 66
End: 2021-01-25
Payer: COMMERCIAL

## 2021-01-25 DIAGNOSIS — Z09 FRACTURE FOLLOW-UP: ICD-10-CM

## 2021-01-25 DIAGNOSIS — S52.021G OLECRANON FRACTURE, RIGHT, CLOSED, WITH DELAYED HEALING, SUBSEQUENT ENCOUNTER: ICD-10-CM

## 2021-01-25 DIAGNOSIS — M25.521 RIGHT ELBOW PAIN: Primary | ICD-10-CM

## 2021-01-25 PROCEDURE — 97140 MANUAL THERAPY 1/> REGIONS: CPT | Performed by: PHYSICAL THERAPIST

## 2021-01-25 PROCEDURE — 97112 NEUROMUSCULAR REEDUCATION: CPT | Performed by: PHYSICAL THERAPIST

## 2021-01-25 PROCEDURE — 97110 THERAPEUTIC EXERCISES: CPT | Performed by: PHYSICAL THERAPIST

## 2021-01-25 NOTE — PROGRESS NOTES
Daily Note     Today's date: 2021  Patient name: Will Saldaña  : 1955  MRN: [id number]  Referring provider: Kendall Winchester  Dx:   Encounter Diagnosis     ICD-10-CM    1  Right elbow pain  M25 521    2  Fracture follow-up  Z09    3  Olecranon fracture, right, closed, with delayed healing, subsequent encounter  S52 021G        Start Time: 1700  Stop Time: 1745  Total time in clinic (min): 45 minutes    Subjective: Pt reports improvements in symptoms  Objective: See treatment diary below      Assessment: Tolerated treatment well  Patient demonstrated fatigue post treatment and would benefit from continued PT      Plan: Continue per plan of care  Precautions: HTN         Manuals        PT assisted light physiologic mobilization  JF       Flexion with PT distraction JF       PNF flexion/extension JF       Neuro Re-Ed        Supine shoulder flexion with TB for scapular depression        [place] carry 3*75"       CC walk outs 2*10 15#       Biceps curls dumbbells  5# 3x10       Ther Ex        Pulleys for flex and ext 6'       Arm bike  4/4'       Towel squeeze wrist         AROM wrist w/weight        AAROM cane elbow flexion b/l        [place]        D1 + D2 TB standing 2*10 rtb       LLPS elbow ext 5'                                                       AROM sup/pro        Ther Activity                        Gait Training                        Modalities

## 2021-01-28 ENCOUNTER — OFFICE VISIT (OUTPATIENT)
Dept: PHYSICAL THERAPY | Age: 66
End: 2021-01-28
Payer: COMMERCIAL

## 2021-01-28 DIAGNOSIS — Z09 FRACTURE FOLLOW-UP: ICD-10-CM

## 2021-01-28 DIAGNOSIS — S52.021G OLECRANON FRACTURE, RIGHT, CLOSED, WITH DELAYED HEALING, SUBSEQUENT ENCOUNTER: ICD-10-CM

## 2021-01-28 DIAGNOSIS — M25.521 RIGHT ELBOW PAIN: Primary | ICD-10-CM

## 2021-01-28 PROCEDURE — 97110 THERAPEUTIC EXERCISES: CPT

## 2021-01-28 PROCEDURE — 97112 NEUROMUSCULAR REEDUCATION: CPT

## 2021-01-28 NOTE — PROGRESS NOTES
Daily Note     Today's date: 2021  Patient name: Will Saldaña  : 1955  MRN: [id number]  Referring provider: Kendall Winchester  Dx:   Encounter Diagnosis     ICD-10-CM    1  Right elbow pain  M25 521    2  Fracture follow-up  Z09    3  Olecranon fracture, right, closed, with delayed healing, subsequent encounter  S52 021G                   Subjective: Patient stated that she is doing well, no new changes since last visit  Objective: See treatment diary below      Assessment: Tolerated treatment well  Patient is able to complete all tasks without adverse effects, demonstrating good form throughout session  Fatigue noted post session  Patient would benefit from continued PT  Plan: continue per plan of care     Precautions: HTN         Manuals       PT assisted light physiologic mobilization  JF       Flexion with PT distraction JF       PNF flexion/extension JF       Neuro Re-Ed       Supine shoulder flexion with TB for scapular depression        [place] carry 3*75" 3x75' 10#      CC walk outs 2*10 15# 2x10 20#      Biceps curls dumbbells  5# 3x10 5# 3x10      Ther Ex       Pulleys for flex and ext 6' 6'      Arm bike  4/4' 4'/4'      Towel squeeze wrist         AROM wrist w/weight        AAROM cane elbow flexion b/l        [place]        D1 + D2 TB standing 2*10 rtb 2x10  D1 rtb   D2 ytb   flex      LLPS elbow ext 5' 5' 5#                                                      AROM sup/pro        Ther Activity                        Gait Training                        Modalities

## 2021-02-01 ENCOUNTER — APPOINTMENT (OUTPATIENT)
Dept: PHYSICAL THERAPY | Age: 66
End: 2021-02-01
Payer: COMMERCIAL

## 2021-02-04 ENCOUNTER — OFFICE VISIT (OUTPATIENT)
Dept: PHYSICAL THERAPY | Age: 66
End: 2021-02-04
Payer: COMMERCIAL

## 2021-02-04 DIAGNOSIS — S52.021G OLECRANON FRACTURE, RIGHT, CLOSED, WITH DELAYED HEALING, SUBSEQUENT ENCOUNTER: ICD-10-CM

## 2021-02-04 DIAGNOSIS — Z09 FRACTURE FOLLOW-UP: ICD-10-CM

## 2021-02-04 DIAGNOSIS — M25.521 RIGHT ELBOW PAIN: Primary | ICD-10-CM

## 2021-02-04 PROCEDURE — 97112 NEUROMUSCULAR REEDUCATION: CPT | Performed by: PHYSICAL THERAPIST

## 2021-02-04 PROCEDURE — 97140 MANUAL THERAPY 1/> REGIONS: CPT | Performed by: PHYSICAL THERAPIST

## 2021-02-04 PROCEDURE — 97110 THERAPEUTIC EXERCISES: CPT | Performed by: PHYSICAL THERAPIST

## 2021-02-04 NOTE — PROGRESS NOTES
Daily Note     Today's date: 2021  Patient name: Lyla Oconnor  : 1955  MRN: [id number]  Referring provider: Indy James  Dx:   Encounter Diagnosis     ICD-10-CM    1  Right elbow pain  M25 521    2  Fracture follow-up  Z09    3  Olecranon fracture, right, closed, with delayed healing, subsequent encounter  S52 021G        Start Time: 1700  Stop Time: 1745  Total time in clinic (min): 45 minutes    Subjective: Pt reports improvements in symptoms  Objective: See treatment diary below      Assessment: Tolerated treatment well  Patient demonstrated fatigue post treatment and would benefit from continued PT      Plan: Continue per plan of care  Precautions: HTN         Manuals 2/4       PT assisted light physiologic mobilization  JF       Flexion with PT distraction JF       PNF flexion/extension JF       Neuro Re-Ed 2/4       Supine shoulder flexion with TB for scapular depression        [place] carry 3*75"       CC walk outs 2*10 15#       Biceps curls dumbbells  5# 3x10       Ther Ex 2/4       Pulleys for flex and ext 6'       Arm bike  4/4'       Towel squeeze wrist         AROM wrist w/weight        AAROM cane elbow flexion b/l        [place]        D1 + D2 TB standing 2*10 rtb       LLPS elbow ext 5'                                                       AROM sup/pro        Ther Activity                        Gait Training                        Modalities

## 2021-02-08 ENCOUNTER — OFFICE VISIT (OUTPATIENT)
Dept: PHYSICAL THERAPY | Age: 66
End: 2021-02-08
Payer: COMMERCIAL

## 2021-02-08 DIAGNOSIS — M25.521 RIGHT ELBOW PAIN: Primary | ICD-10-CM

## 2021-02-08 DIAGNOSIS — Z09 FRACTURE FOLLOW-UP: ICD-10-CM

## 2021-02-08 DIAGNOSIS — S52.021G OLECRANON FRACTURE, RIGHT, CLOSED, WITH DELAYED HEALING, SUBSEQUENT ENCOUNTER: ICD-10-CM

## 2021-02-08 PROCEDURE — 97110 THERAPEUTIC EXERCISES: CPT | Performed by: PHYSICAL THERAPIST

## 2021-02-08 PROCEDURE — 97140 MANUAL THERAPY 1/> REGIONS: CPT | Performed by: PHYSICAL THERAPIST

## 2021-02-08 PROCEDURE — 97112 NEUROMUSCULAR REEDUCATION: CPT | Performed by: PHYSICAL THERAPIST

## 2021-02-08 NOTE — PROGRESS NOTES
Daily Note     Today's date: 2021  Patient name: Claudia Ocampo  : 1955  MRN: [id number]  Referring provider: Brandi Castro  Dx:   Encounter Diagnosis     ICD-10-CM    1  Right elbow pain  M25 521    2  Fracture follow-up  Z09    3  Olecranon fracture, right, closed, with delayed healing, subsequent encounter  S52 021G                   Subjective: Patient stated that she is doing well, no new changes since last visit  Objective: See treatment diary below      Assessment: Tolerated treatment well  Patient is able to complete all tasks without adverse effects, demonstrating good form throughout session  Fatigue noted post session  Patient would benefit from continued PT  Plan: continue per plan of care     Precautions: HTN         Manuals       PT assisted light physiologic mobilization  JF JF      Flexion with PT distraction JF JF      PNF flexion/extension JF JF      Neuro Re-Ed       Supine shoulder flexion with TB for scapular depression        [place] carry 3*75" 3x75' 10#      CC walk outs 2*10 15# 2x10 20#      Biceps curls dumbbells  5# 3x10 5# 3x10      Ther Ex  2/8      Pulleys for flex and ext 6' 6'      Arm bike  4/4' 4'/4'      Towel squeeze wrist         AROM wrist w/weight        AAROM cane elbow flexion b/l        [place]        D1 + D2 TB standing 2*10 rtb 2x10  D1 rtb   D2 ytb   flex      LLPS elbow ext 5' 5' 5#                                                      AROM sup/pro        Ther Activity                        Gait Training                        Modalities

## 2021-02-11 ENCOUNTER — OFFICE VISIT (OUTPATIENT)
Dept: PHYSICAL THERAPY | Age: 66
End: 2021-02-11
Payer: COMMERCIAL

## 2021-02-11 DIAGNOSIS — M25.521 RIGHT ELBOW PAIN: Primary | ICD-10-CM

## 2021-02-11 DIAGNOSIS — S52.021G OLECRANON FRACTURE, RIGHT, CLOSED, WITH DELAYED HEALING, SUBSEQUENT ENCOUNTER: ICD-10-CM

## 2021-02-11 DIAGNOSIS — Z09 FRACTURE FOLLOW-UP: ICD-10-CM

## 2021-02-11 PROCEDURE — 97110 THERAPEUTIC EXERCISES: CPT | Performed by: PHYSICAL THERAPIST

## 2021-02-11 PROCEDURE — 97112 NEUROMUSCULAR REEDUCATION: CPT | Performed by: PHYSICAL THERAPIST

## 2021-02-11 PROCEDURE — 97140 MANUAL THERAPY 1/> REGIONS: CPT | Performed by: PHYSICAL THERAPIST

## 2021-02-11 NOTE — PROGRESS NOTES
Daily Note     Today's date: 2021  Patient name: Valerie Forman  : 1955  MRN: [id number]  Referring provider: Sotero Marrero  Dx:   Encounter Diagnosis     ICD-10-CM    1  Right elbow pain  M25 521    2  Fracture follow-up  Z09    3  Olecranon fracture, right, closed, with delayed healing, subsequent encounter  S52 021G        Start Time: 1700  Stop Time: 1750  Total time in clinic (min): 50 minutes    Subjective: Pt reports improvements with symptoms  Objective: See treatment diary below      Assessment: Tolerated treatment well  Patient demonstrated fatigue post treatment and would benefit from continued PT      Plan: Continue per plan of care  Precautions: HTN         Manuals      PT assisted light physiologic mobilization  JF  JF     Flexion with PT distraction JF  JF     PNF flexion/extension JF  JF     Neuro Re-Ed      Supine shoulder flexion with TB for scapular depression        [place] carry 3*75" 3x75' 10# 3*75' 10#     CC walk outs 2*10 15# 2x10 20# 2*10 20#     Biceps curls dumbbells  5# 3x10 5# 3x10 5# 3*10     Ther Ex 11     Pulleys for flex and ext 6' 6' 6'     Arm bike  4/4' 4'/4' 4/4'     Towel squeeze wrist         AROM wrist w/weight        AAROM cane elbow flexion b/l        [place]        D1 + D2 TB standing 2*10 rtb 2x10  D1 rtb   D2 ytb   flex 2x10  D1 rtb   D2 ytb   flex     LLPS elbow ext 5' 5' 5# 5' 5#                                                     AROM sup/pro        Ther Activity                        Gait Training                        Modalities

## 2021-02-15 ENCOUNTER — OFFICE VISIT (OUTPATIENT)
Dept: PHYSICAL THERAPY | Age: 66
End: 2021-02-15
Payer: COMMERCIAL

## 2021-02-15 DIAGNOSIS — S52.021G OLECRANON FRACTURE, RIGHT, CLOSED, WITH DELAYED HEALING, SUBSEQUENT ENCOUNTER: ICD-10-CM

## 2021-02-15 DIAGNOSIS — M25.521 RIGHT ELBOW PAIN: Primary | ICD-10-CM

## 2021-02-15 DIAGNOSIS — Z09 FRACTURE FOLLOW-UP: ICD-10-CM

## 2021-02-15 PROCEDURE — 97110 THERAPEUTIC EXERCISES: CPT | Performed by: PHYSICAL THERAPIST

## 2021-02-15 PROCEDURE — 97112 NEUROMUSCULAR REEDUCATION: CPT | Performed by: PHYSICAL THERAPIST

## 2021-02-15 PROCEDURE — 97140 MANUAL THERAPY 1/> REGIONS: CPT | Performed by: PHYSICAL THERAPIST

## 2021-02-15 NOTE — PROGRESS NOTES
Daily Note     Today's date: 2/15/2021  Patient name: Tamika Canales  : 1955  MRN: [id number]  Referring provider: Thomes   Dx:   Encounter Diagnosis     ICD-10-CM    1  Right elbow pain  M25 521    2  Fracture follow-up  Z09    3  Olecranon fracture, right, closed, with delayed healing, subsequent encounter  S52 021G        Start Time: 1700  Stop Time: 1745  Total time in clinic (min): 45 minutes    Subjective: Pt continues to demonstrate improvements in ROM  Objective: See treatment diary below      Assessment: Tolerated treatment well  Pt's POC was progressed to include more more flexion based interventions  Patient demonstrated fatigue post treatment and would benefit from continued PT      Plan: Continue per plan of care  Precautions: HTN         Manuals 1/25 1/28 2/11 2/15    PT assisted light physiologic mobilization  JF  JF JF    Flexion with PT distraction JF  JF JF    PNF flexion/extension JF  JF JF    Neuro Re-Ed 1/25 1/28 2/11 2/15    Supine shoulder flexion with TB for scapular depression        [place] carry 3*75" 3x75' 10# 3*75' 10# 3*75' 10#    CC walk outs 2*10 15# 2x10 20# 2*10 20# 2*10 20#    Biceps curls dumbbells  5# 3x10 5# 3x10 5# 3*10 6# 3*10    Ther Ex 1/25 1/28 2/11 2/15    Pulleys for flex and ext 6' 6' 6' 6'    Arm bike  4/4' 4'/4' 4/4' 4/4'    Towel squeeze wrist         AROM wrist w/weight        AAROM cane elbow flexion b/l        [place]        D1 + D2 TB standing 2*10 rtb 2x10  D1 rtb   D2 ytb   flex 2x10  D1 rtb   D2 ytb   flex 2x10  D1 rtb   D2 ytb   flex    LLPS elbow ext 5' 5' 5# 5' 5# 5' 5#                                                    AROM sup/pro        Ther Activity                        Gait Training                        Modalities

## 2021-02-18 ENCOUNTER — APPOINTMENT (OUTPATIENT)
Dept: PHYSICAL THERAPY | Age: 66
End: 2021-02-18
Payer: COMMERCIAL

## 2021-02-22 ENCOUNTER — APPOINTMENT (OUTPATIENT)
Dept: PHYSICAL THERAPY | Age: 66
End: 2021-02-22
Payer: COMMERCIAL

## 2021-02-23 ENCOUNTER — OFFICE VISIT (OUTPATIENT)
Dept: PHYSICAL THERAPY | Age: 66
End: 2021-02-23
Payer: COMMERCIAL

## 2021-02-23 DIAGNOSIS — Z09 FRACTURE FOLLOW-UP: ICD-10-CM

## 2021-02-23 DIAGNOSIS — M25.521 RIGHT ELBOW PAIN: Primary | ICD-10-CM

## 2021-02-23 DIAGNOSIS — S52.021G OLECRANON FRACTURE, RIGHT, CLOSED, WITH DELAYED HEALING, SUBSEQUENT ENCOUNTER: ICD-10-CM

## 2021-02-23 PROCEDURE — 97112 NEUROMUSCULAR REEDUCATION: CPT | Performed by: PHYSICAL THERAPIST

## 2021-02-23 PROCEDURE — 97140 MANUAL THERAPY 1/> REGIONS: CPT | Performed by: PHYSICAL THERAPIST

## 2021-02-23 PROCEDURE — 97110 THERAPEUTIC EXERCISES: CPT | Performed by: PHYSICAL THERAPIST

## 2021-02-24 NOTE — PROGRESS NOTES
Daily Note     Today's date: 2021  Patient name: Roma Michael  : 1955  MRN: [id number]  Referring provider: Mikayla Taylor  Dx:   Encounter Diagnosis     ICD-10-CM    1  Right elbow pain  M25 521    2  Fracture follow-up  Z09    3  Olecranon fracture, right, closed, with delayed healing, subsequent encounter  S52 021G                   Subjective: Pt reports no new symptoms  Objective: See treatment diary below      Assessment: Tolerated treatment well  Patient demonstrated fatigue post treatment and would benefit from continued PT      Plan: Continue per plan of care  Precautions: HTN         Manuals     PT assisted light physiologic mobilization  JF  JF JF    Flexion with PT distraction JF  JF JF    PNF flexion/extension JF  JF JF    Neuro Re-Ed 1/25 1/28 2/11 2/15    Supine shoulder flexion with TB for scapular depression        [place] carry 3*75" 3x75' 10# 3*75' 10# 3*75' 10#    CC walk outs 2*10 15# 2x10 20# 2*10 20# 2*10 20#    Biceps curls dumbbells  5# 3x10 5# 3x10 5# 3*10 6# 3*10    Ther Ex 1/25 1/28 2/11 2/15    Pulleys for flex and ext 6' 6' 6' 6'    Arm bike  4/4' 4'/4' 4/4' 4/4'    Towel squeeze wrist         AROM wrist w/weight        AAROM cane elbow flexion b/l        [place]        D1 + D2 TB standing 2*10 rtb 2x10  D1 rtb   D2 ytb   flex 2x10  D1 rtb   D2 ytb   flex 2x10  D1 rtb   D2 ytb   flex    LLPS elbow ext 5' 5' 5# 5' 5# 5' 5#                                                    AROM sup/pro        Ther Activity                        Gait Training                        Modalities

## 2021-02-25 ENCOUNTER — OFFICE VISIT (OUTPATIENT)
Dept: PHYSICAL THERAPY | Age: 66
End: 2021-02-25
Payer: COMMERCIAL

## 2021-02-25 DIAGNOSIS — Z09 FRACTURE FOLLOW-UP: ICD-10-CM

## 2021-02-25 DIAGNOSIS — S52.021G OLECRANON FRACTURE, RIGHT, CLOSED, WITH DELAYED HEALING, SUBSEQUENT ENCOUNTER: ICD-10-CM

## 2021-02-25 DIAGNOSIS — M25.521 RIGHT ELBOW PAIN: Primary | ICD-10-CM

## 2021-02-25 PROCEDURE — 97140 MANUAL THERAPY 1/> REGIONS: CPT | Performed by: PHYSICAL THERAPIST

## 2021-02-25 PROCEDURE — 97110 THERAPEUTIC EXERCISES: CPT | Performed by: PHYSICAL THERAPIST

## 2021-02-25 PROCEDURE — 97112 NEUROMUSCULAR REEDUCATION: CPT | Performed by: PHYSICAL THERAPIST

## 2021-02-25 NOTE — PROGRESS NOTES
D/C Note     Today's date: 2020  Patient name: Michelle Jordan  : 1955  MRN: [id number]  Referring provider: Summer Rutherford  Dx:   Encounter Diagnosis     ICD-10-CM    1  Right elbow pain  M25 521    2  Fracture follow-up  Z09    3  Olecranon fracture, right, closed, with delayed healing, subsequent encounter  S52 021G        Start Time: 1700  Stop Time: 1745  Total time in clinic (min): 50 minutes     Assessment  Assessment details: Pt is a 61 y/o female who presents with elbow pain following ORIF  Pt has made progress with functional strength, but patient is still limited with elbow flexion  If patient is unable to achieve goals performing HEP, patient would benefit from orthopedic evaluation to discuss possible further treatment  Pt would benefit from PT to address these impairments leading to increased functional capacity and improved quality of life  Impairments: abnormal or restricted ROM, impaired physical strength, lacks appropriate home exercise program, pain with function, poor posture  and poor body mechanics  Understanding of Dx/Px/POC: good   Prognosis: good    Subjective: Deshaun Isabel reports clicking in her shoulder, but otherwise no new change in symptoms  She feels 80% overall improvement, limited by strength limitations        Objective: See treatment diary below    Goals  Short Term Goals: to be achieved by 4 weeks  1) Patient to be independent with basic HEP  -MET  2) Decrease pain to 2/10 at its worst  -MET  3) Increase shoulder ROM by 5-10 degrees in all planes -MET  4) Increase shoulder strength by 1/2 MMT grade in all deficient planes  -MET    Long Term Goals: to be achieved by discharge  1) FOTO equal to or greater than 66   -MET  2) Patient to be independent with comprehensive HEP  -MET  3) Patient will demonstrate maximal over head reaching -MET  4) Increase UE strength to 5/5 MMT grade in all planes to improve a/iadls  -partially met  5) Patient to report no sleep interruption secondary to pain  -MET  6) Achieve 130 degrees of flexion to reach accepted functional capacity - Partially Met     Plan:    Patient would benefit from: D/C PT        Subjective Evaluation        Pain  Current pain ratin  At best pain ratin  At worst pain ratin  Quality: sharp, radiating and dull ache  Aggravating factors: overhead activity, keyboarding and lifting    Hand dominance: right    Patient Goals  Patient goals for therapy: decreased pain, independence with ADLs/IADLs, return to sport/leisure activities, increased strength and increased motion          Objective     Active Range of Motion     Shoulder MMT:  4/5 in all planes of motion  4/5 in all plane of motion at elbow    Passive Range of Motion     Right Shoulder PROM  Flexion: 170 degrees   Abduction: 150 degrees     Right Elbow   Flexion: 130 degrees   Extension: 0 degrees     Additional Passive Range of Motion Details     strength:  Right: 50lbs  Left:55lbs      Assessment: Patient demonstrated fatigue post treatment and exhibited good technique with therapeutic exercises      Plan: Progress treatment as tolerated  Precautions: HTN           Manuals     PT assisted light physiologic mobilization  JF  JF JF    Flexion with PT distraction JF  JF JF    PNF flexion/extension JF  JF JF    Neuro Re-Ed     Supine shoulder flexion with TB for scapular depression        [place] carry 3*75" 3x75' 10# 3*75' 10# 3*75' 10#    CC walk outs 2*10 15# 2x10 20# 2*10 20# 2*10 20#    Biceps curls dumbbells  5# 3x10 5# 3x10 5# 3*10 6# 3*10    Ther Ex     Pulleys for flex and ext 6' 6' 6' 6'    Arm bike  4/4' 4'/4' 4/4' 4/4'    Towel squeeze wrist         AROM wrist w/weight        AAROM cane elbow flexion b/l        [place]        D1 + D2 TB standing 2*10 rtb 2x10  D1 rtb   D2 ytb   flex 2x10  D1 rtb   D2 ytb   flex 2x10  D1 rtb   D2 ytb   flex    LLPS elbow ext 5' 5' 5# 5' 5# 5' 5#                                                    AROM sup/pro        Ther Activity                        Gait Training                        Modalities

## 2021-04-02 ENCOUNTER — VBI (OUTPATIENT)
Dept: ADMINISTRATIVE | Facility: OTHER | Age: 66
End: 2021-04-02

## 2021-04-26 DIAGNOSIS — I10 UNCONTROLLED STAGE 2 HYPERTENSION: ICD-10-CM

## 2021-04-26 DIAGNOSIS — I10 ESSENTIAL HYPERTENSION: ICD-10-CM

## 2021-04-26 RX ORDER — LOSARTAN POTASSIUM AND HYDROCHLOROTHIAZIDE 12.5; 5 MG/1; MG/1
1 TABLET ORAL EVERY OTHER DAY
Qty: 15 TABLET | Refills: 0 | Status: SHIPPED | OUTPATIENT
Start: 2021-04-26 | End: 2021-05-05 | Stop reason: SDUPTHER

## 2021-05-05 ENCOUNTER — TRANSCRIBE ORDERS (OUTPATIENT)
Dept: ADMINISTRATIVE | Facility: HOSPITAL | Age: 66
End: 2021-05-05

## 2021-05-05 ENCOUNTER — OFFICE VISIT (OUTPATIENT)
Dept: INTERNAL MEDICINE CLINIC | Age: 66
End: 2021-05-05
Payer: COMMERCIAL

## 2021-05-05 VITALS
HEART RATE: 76 BPM | OXYGEN SATURATION: 97 % | HEIGHT: 66 IN | SYSTOLIC BLOOD PRESSURE: 172 MMHG | TEMPERATURE: 98.4 F | DIASTOLIC BLOOD PRESSURE: 102 MMHG | WEIGHT: 153.5 LBS | BODY MASS INDEX: 24.67 KG/M2

## 2021-05-05 DIAGNOSIS — F12.90 MARIJUANA USE: ICD-10-CM

## 2021-05-05 DIAGNOSIS — E78.49 OTHER HYPERLIPIDEMIA: ICD-10-CM

## 2021-05-05 DIAGNOSIS — Z12.31 ENCOUNTER FOR SCREENING MAMMOGRAM FOR MALIGNANT NEOPLASM OF BREAST: Primary | ICD-10-CM

## 2021-05-05 DIAGNOSIS — R73.9 HYPERGLYCEMIA: ICD-10-CM

## 2021-05-05 DIAGNOSIS — I10 UNCONTROLLED STAGE 2 HYPERTENSION: ICD-10-CM

## 2021-05-05 DIAGNOSIS — I10 ESSENTIAL HYPERTENSION: Primary | ICD-10-CM

## 2021-05-05 DIAGNOSIS — Z11.59 NEED FOR HEPATITIS C SCREENING TEST: ICD-10-CM

## 2021-05-05 PROBLEM — E66.3 OVERWEIGHT: Status: RESOLVED | Noted: 2020-08-26 | Resolved: 2021-05-05

## 2021-05-05 PROCEDURE — 99214 OFFICE O/P EST MOD 30 MIN: CPT | Performed by: INTERNAL MEDICINE

## 2021-05-05 PROCEDURE — 3725F SCREEN DEPRESSION PERFORMED: CPT | Performed by: INTERNAL MEDICINE

## 2021-05-05 RX ORDER — LOSARTAN POTASSIUM AND HYDROCHLOROTHIAZIDE 12.5; 5 MG/1; MG/1
1 TABLET ORAL DAILY
Qty: 30 TABLET | Refills: 0 | Status: SHIPPED | OUTPATIENT
Start: 2021-05-05 | End: 2021-06-02 | Stop reason: SDUPTHER

## 2021-05-05 NOTE — PATIENT INSTRUCTIONS
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
## 2021-05-19 ENCOUNTER — APPOINTMENT (OUTPATIENT)
Dept: LAB | Age: 66
End: 2021-05-19
Payer: COMMERCIAL

## 2021-05-19 DIAGNOSIS — Z00.00 ANNUAL PHYSICAL EXAM: ICD-10-CM

## 2021-05-19 DIAGNOSIS — I10 ESSENTIAL HYPERTENSION: ICD-10-CM

## 2021-05-19 DIAGNOSIS — E78.49 OTHER HYPERLIPIDEMIA: ICD-10-CM

## 2021-05-19 DIAGNOSIS — Z11.59 NEED FOR HEPATITIS C SCREENING TEST: ICD-10-CM

## 2021-05-19 DIAGNOSIS — R73.9 HYPERGLYCEMIA: ICD-10-CM

## 2021-05-19 LAB
ALBUMIN SERPL BCP-MCNC: 4.1 G/DL (ref 3.5–5)
ALP SERPL-CCNC: 92 U/L (ref 46–116)
ALT SERPL W P-5'-P-CCNC: 21 U/L (ref 12–78)
ANION GAP SERPL CALCULATED.3IONS-SCNC: 9 MMOL/L (ref 4–13)
AST SERPL W P-5'-P-CCNC: 11 U/L (ref 5–45)
BASOPHILS # BLD AUTO: 0.08 THOUSANDS/ΜL (ref 0–0.1)
BASOPHILS NFR BLD AUTO: 1 % (ref 0–1)
BILIRUB SERPL-MCNC: 1.08 MG/DL (ref 0.2–1)
BUN SERPL-MCNC: 8 MG/DL (ref 5–25)
CALCIUM SERPL-MCNC: 9.5 MG/DL (ref 8.3–10.1)
CHLORIDE SERPL-SCNC: 94 MMOL/L (ref 100–108)
CHOLEST SERPL-MCNC: 249 MG/DL (ref 50–200)
CO2 SERPL-SCNC: 30 MMOL/L (ref 21–32)
CREAT SERPL-MCNC: 0.85 MG/DL (ref 0.6–1.3)
EOSINOPHIL # BLD AUTO: 0.63 THOUSAND/ΜL (ref 0–0.61)
EOSINOPHIL NFR BLD AUTO: 8 % (ref 0–6)
ERYTHROCYTE [DISTWIDTH] IN BLOOD BY AUTOMATED COUNT: 12.3 % (ref 11.6–15.1)
GFR SERPL CREATININE-BSD FRML MDRD: 73 ML/MIN/1.73SQ M
GLUCOSE P FAST SERPL-MCNC: 127 MG/DL (ref 65–99)
HCT VFR BLD AUTO: 46.4 % (ref 34.8–46.1)
HCV AB SER QL: NORMAL
HDLC SERPL-MCNC: 63 MG/DL
HGB BLD-MCNC: 16.1 G/DL (ref 11.5–15.4)
IMM GRANULOCYTES # BLD AUTO: 0.02 THOUSAND/UL (ref 0–0.2)
IMM GRANULOCYTES NFR BLD AUTO: 0 % (ref 0–2)
LDLC SERPL CALC-MCNC: 159 MG/DL (ref 0–100)
LYMPHOCYTES # BLD AUTO: 2.59 THOUSANDS/ΜL (ref 0.6–4.47)
LYMPHOCYTES NFR BLD AUTO: 35 % (ref 14–44)
MCH RBC QN AUTO: 31.6 PG (ref 26.8–34.3)
MCHC RBC AUTO-ENTMCNC: 34.7 G/DL (ref 31.4–37.4)
MCV RBC AUTO: 91 FL (ref 82–98)
MONOCYTES # BLD AUTO: 0.53 THOUSAND/ΜL (ref 0.17–1.22)
MONOCYTES NFR BLD AUTO: 7 % (ref 4–12)
NEUTROPHILS # BLD AUTO: 3.65 THOUSANDS/ΜL (ref 1.85–7.62)
NEUTS SEG NFR BLD AUTO: 49 % (ref 43–75)
NONHDLC SERPL-MCNC: 186 MG/DL
NRBC BLD AUTO-RTO: 0 /100 WBCS
PLATELET # BLD AUTO: 259 THOUSANDS/UL (ref 149–390)
PMV BLD AUTO: 9.7 FL (ref 8.9–12.7)
POTASSIUM SERPL-SCNC: 3.9 MMOL/L (ref 3.5–5.3)
PROT SERPL-MCNC: 7.1 G/DL (ref 6.4–8.2)
RBC # BLD AUTO: 5.1 MILLION/UL (ref 3.81–5.12)
SODIUM SERPL-SCNC: 133 MMOL/L (ref 136–145)
TRIGL SERPL-MCNC: 135 MG/DL
TSH SERPL DL<=0.05 MIU/L-ACNC: 2.75 UIU/ML (ref 0.36–3.74)
WBC # BLD AUTO: 7.5 THOUSAND/UL (ref 4.31–10.16)

## 2021-05-19 PROCEDURE — 80061 LIPID PANEL: CPT

## 2021-05-19 PROCEDURE — 36415 COLL VENOUS BLD VENIPUNCTURE: CPT

## 2021-05-19 PROCEDURE — 80053 COMPREHEN METABOLIC PANEL: CPT

## 2021-05-19 PROCEDURE — 84443 ASSAY THYROID STIM HORMONE: CPT

## 2021-05-19 PROCEDURE — 86803 HEPATITIS C AB TEST: CPT

## 2021-05-19 PROCEDURE — 85025 COMPLETE CBC W/AUTO DIFF WBC: CPT

## 2021-05-30 DIAGNOSIS — I10 UNCONTROLLED STAGE 2 HYPERTENSION: ICD-10-CM

## 2021-05-30 DIAGNOSIS — I10 ESSENTIAL HYPERTENSION: ICD-10-CM

## 2021-06-01 RX ORDER — LOSARTAN POTASSIUM AND HYDROCHLOROTHIAZIDE 12.5; 5 MG/1; MG/1
TABLET ORAL
Qty: 30 TABLET | Refills: 0 | OUTPATIENT
Start: 2021-06-01

## 2021-06-02 ENCOUNTER — OFFICE VISIT (OUTPATIENT)
Dept: INTERNAL MEDICINE CLINIC | Age: 66
End: 2021-06-02
Payer: COMMERCIAL

## 2021-06-02 VITALS
TEMPERATURE: 98.8 F | WEIGHT: 151 LBS | OXYGEN SATURATION: 94 % | HEIGHT: 66 IN | SYSTOLIC BLOOD PRESSURE: 138 MMHG | BODY MASS INDEX: 24.27 KG/M2 | DIASTOLIC BLOOD PRESSURE: 84 MMHG | HEART RATE: 76 BPM

## 2021-06-02 DIAGNOSIS — I10 ESSENTIAL HYPERTENSION: Primary | ICD-10-CM

## 2021-06-02 DIAGNOSIS — R06.00 DYSPNEA ON EXERTION: ICD-10-CM

## 2021-06-02 DIAGNOSIS — E87.1 HYPONATREMIA: ICD-10-CM

## 2021-06-02 DIAGNOSIS — R73.9 HYPERGLYCEMIA: ICD-10-CM

## 2021-06-02 DIAGNOSIS — E78.49 OTHER HYPERLIPIDEMIA: ICD-10-CM

## 2021-06-02 PROBLEM — R06.09 DYSPNEA ON EXERTION: Status: ACTIVE | Noted: 2021-06-02

## 2021-06-02 PROCEDURE — 3079F DIAST BP 80-89 MM HG: CPT | Performed by: INTERNAL MEDICINE

## 2021-06-02 PROCEDURE — 3008F BODY MASS INDEX DOCD: CPT | Performed by: INTERNAL MEDICINE

## 2021-06-02 PROCEDURE — 99214 OFFICE O/P EST MOD 30 MIN: CPT | Performed by: INTERNAL MEDICINE

## 2021-06-02 PROCEDURE — 3075F SYST BP GE 130 - 139MM HG: CPT | Performed by: INTERNAL MEDICINE

## 2021-06-02 PROCEDURE — 1036F TOBACCO NON-USER: CPT | Performed by: INTERNAL MEDICINE

## 2021-06-02 RX ORDER — LOSARTAN POTASSIUM AND HYDROCHLOROTHIAZIDE 12.5; 5 MG/1; MG/1
1 TABLET ORAL DAILY
Qty: 90 TABLET | Refills: 1 | Status: SHIPPED | OUTPATIENT
Start: 2021-06-02 | End: 2021-09-09 | Stop reason: ALTCHOICE

## 2021-06-02 RX ORDER — ALBUTEROL SULFATE 90 UG/1
2 AEROSOL, METERED RESPIRATORY (INHALATION) EVERY 6 HOURS PRN
Qty: 8 G | Refills: 0 | Status: SHIPPED | OUTPATIENT
Start: 2021-06-02 | End: 2022-08-08 | Stop reason: SDUPTHER

## 2021-06-02 NOTE — PATIENT INSTRUCTIONS

## 2021-06-02 NOTE — PROGRESS NOTES
Assessment/Plan:    Essential hypertension  -much improved   -continue with losartan- hydrochlorothiazide   - will refill BP meds today  -continue with a low-salt diet    Hyponatremia  - last CMP showed a sodium of 133, possibly secondary to diuretics   -will recheck a BMP prior to next visit    Hyperlipidemia   - lipid panel done on May 19th, 2021 showed a total cholesterol of 249, triglyceride of 135, HDL of 63 and LDL of 159  - calculated 10 year ASCVD risk is 7 5% with the recommendation for statin but patient declines and wants to try diet and exercise  - she was counseled to eat a heart healthy diet and exercise   - will recheck a lipid panel prior to next visit   - follow-up in 3 months    Hyperglycemia   - fasting glucose done on May 19th, 2021 was 127, down from 148 on August 31st, 2020  -will order hemoglobin A1c to confirm possible diabetes mellitus    Dyspnea on exertion with a history of asthma  - will  prescribe albuterol inhaler for shortness of breath and wheezing  - she was counseled to report if she continues to have palpitations and will likely switch her to levalbuterol inhaler      The 10-year ASCVD risk score (Cece Reyes et al , 2013) is: 7 5%    Values used to calculate the score:      Age: 63 years      Sex: Female      Is Non- : No      Diabetic: No      Tobacco smoker: No      Systolic Blood Pressure: 942 mmHg      Is BP treated: Yes      HDL Cholesterol: 63 mg/dL      Total Cholesterol: 249 mg/dL       Diagnoses and all orders for this visit:    Essential hypertension  -     losartan-hydrochlorothiazide (HYZAAR) 50-12 5 mg per tablet; Take 1 tablet by mouth daily    Hyperglycemia  -     Hemoglobin A1C; Future    Other hyperlipidemia  -     Lipid panel; Future    Uncontrolled stage 2 hypertension  -     losartan-hydrochlorothiazide (HYZAAR) 50-12 5 mg per tablet;  Take 1 tablet by mouth daily    Dyspnea on exertion  -     albuterol (PROVENTIL HFA,VENTOLIN HFA) 90 mcg/act inhaler; Inhale 2 puffs every 6 (six) hours as needed for wheezing or shortness of breath    Hyponatremia  -     Basic metabolic panel; Future          Subjective:      Patient ID: Ronit Parrish is a 63 y o  female  HPI  Patient presents for a follow-up visit regarding her essential hypertension and her recently done blood work  She checked her blood pressure and brought in a blood pressure log  A review of her blood pressure log shows a high systolic around 884 and a low of 105  She states that her blood pressures have improved since she started the medication and she would like a refill of her blood pressure medication  She also brought in her sphygmomanometer  Patient admits that she has been eating a lot of meat and a review of her labs shows hyperlipidemia  She states that she used to be on cholesterol medication as well as blood pressure medications but after she lost a little weight, she stopped taking them  She would like to try to watch her diet and exercise again before being started on a statin  She also admits to occasional chest tightness and states that a long time ago she was diagnosed with asthma after she had a spirometry test done  She states that she no longer has her inhaler and would like a refill  She admits to occasional palpitations especially when she uses albuterol inhaler but denies fever, chills, night sweats, chest pain, nausea, vomiting, abdominal pain, diarrhea, constipation, headache, dizziness  Of note, she does not smoke cigarettes but still smokes marijuana  The following portions of the patient's history were reviewed and updated as appropriate:   She  has a past medical history of Asthma, History of transfusion, Hypertension, and PONV (postoperative nausea and vomiting)    She   Patient Active Problem List    Diagnosis Date Noted    Dyspnea on exertion 06/02/2021    Other hyperlipidemia 05/05/2021    Hyperglycemia 05/05/2021    Marijuana use 05/05/2021    Aftercare following surgery 10/08/2020    Smoking 08/31/2020    Essential hypertension 08/27/2020    Annual physical exam 08/27/2020    Closed fracture of olecranon process of right ulna 08/27/2020    Fall 08/26/2020    Trauma to multiple areas of the body  08/26/2020    Pain and swelling of upper extremity, right 08/26/2020    Bruising of multiple areas 08/26/2020    Right elbow pain 08/26/2020    Breast cancer screening 08/26/2020    Colon cancer screening 08/26/2020     She  has a past surgical history that includes Bunionectomy; Tubal ligation; and ELBOW FRACTURE REPAIR (Right, 8/31/2020)  Her family history includes Hypertension in her mother; Kidney cancer in her father; Lung cancer in her father; Parkinsonism in her mother  She  reports that she has quit smoking  Her smoking use included cigarettes  She has never used smokeless tobacco  She reports current alcohol use  She reports current drug use  Drug: Marijuana  Current Outpatient Medications   Medication Sig Dispense Refill    losartan-hydrochlorothiazide (HYZAAR) 50-12 5 mg per tablet Take 1 tablet by mouth daily 90 tablet 1    acetaminophen (TYLENOL) 500 mg tablet Take 1,000 mg by mouth every 6 (six) hours as needed for mild pain      albuterol (PROVENTIL HFA,VENTOLIN HFA) 90 mcg/act inhaler Inhale 2 puffs every 6 (six) hours as needed for wheezing or shortness of breath 8 g 0    Ibuprofen (ADVIL PO) Take by mouth as needed       No current facility-administered medications for this visit  Current Outpatient Medications on File Prior to Visit   Medication Sig    [DISCONTINUED] losartan-hydrochlorothiazide (HYZAAR) 50-12 5 mg per tablet Take 1 tablet by mouth daily    acetaminophen (TYLENOL) 500 mg tablet Take 1,000 mg by mouth every 6 (six) hours as needed for mild pain    Ibuprofen (ADVIL PO) Take by mouth as needed     No current facility-administered medications on file prior to visit        She has No Known Allergies       Review of Systems   Constitutional: Negative for activity change, chills, fatigue, fever and unexpected weight change  HENT: Negative for ear pain, postnasal drip, rhinorrhea, sinus pressure and sore throat  Eyes: Negative for pain  Respiratory: Positive for shortness of breath  Negative for cough, choking, chest tightness and wheezing  Cardiovascular: Positive for palpitations  Negative for chest pain and leg swelling  Gastrointestinal: Negative for abdominal pain, constipation, diarrhea, nausea and vomiting  Genitourinary: Negative for dysuria and hematuria  Musculoskeletal: Negative for arthralgias, back pain, gait problem, joint swelling, myalgias and neck stiffness  Skin: Negative for pallor and rash  Neurological: Negative for dizziness, tremors, seizures, syncope, light-headedness and headaches  Hematological: Negative for adenopathy  Psychiatric/Behavioral: Negative for behavioral problems  Objective:      /84 (BP Location: Left arm, Patient Position: Sitting, Cuff Size: Standard)   Pulse 76   Temp 98 8 °F (37 1 °C) (Temporal)   Ht 5' 6" (1 676 m) Comment: shoes off  Wt 68 5 kg (151 lb)   SpO2 94%   BMI 24 37 kg/m²          Physical Exam  Constitutional:       General: She is not in acute distress  Appearance: She is well-developed  She is not diaphoretic  HENT:      Head: Normocephalic and atraumatic  Right Ear: External ear normal       Left Ear: External ear normal       Nose: Nose normal       Mouth/Throat:      Pharynx: Posterior oropharyngeal erythema (Mild oropharyngeal erythema with dry mucous membranes) present  No oropharyngeal exudate  Eyes:      General: No scleral icterus  Right eye: No discharge  Left eye: No discharge  Conjunctiva/sclera: Conjunctivae normal       Pupils: Pupils are equal, round, and reactive to light  Neck:      Musculoskeletal: Normal range of motion and neck supple        Thyroid: No thyromegaly  Vascular: No JVD  Trachea: No tracheal deviation  Cardiovascular:      Rate and Rhythm: Normal rate and regular rhythm  Heart sounds: Normal heart sounds  No murmur  No friction rub  No gallop  Pulmonary:      Effort: Pulmonary effort is normal  No respiratory distress  Breath sounds: Examination of the right-upper field reveals wheezing and rhonchi  Examination of the left-lower field reveals wheezing and rhonchi  Wheezing ( scattered wheezes and rhonchi) and rhonchi present  No rales  Chest:      Chest wall: No tenderness  Abdominal:      General: Bowel sounds are normal  There is no distension  Palpations: Abdomen is soft  There is no mass  Tenderness: There is no abdominal tenderness  There is no guarding or rebound  Musculoskeletal: Normal range of motion  General: No tenderness or deformity  Lymphadenopathy:      Cervical: No cervical adenopathy  Skin:     General: Skin is warm and dry  Coloration: Skin is not pale  Findings: No erythema or rash  Neurological:      Mental Status: She is alert and oriented to person, place, and time  Cranial Nerves: No cranial nerve deficit  Motor: No abnormal muscle tone  Coordination: Coordination normal       Deep Tendon Reflexes: Reflexes are normal and symmetric     Psychiatric:         Behavior: Behavior normal            Appointment on 05/19/2021   Component Date Value Ref Range Status    Hepatitis C Ab 05/19/2021 Non-reactive  Non-reactive Final    Sodium 05/19/2021 133* 136 - 145 mmol/L Final    Potassium 05/19/2021 3 9  3 5 - 5 3 mmol/L Final    Chloride 05/19/2021 94* 100 - 108 mmol/L Final    CO2 05/19/2021 30  21 - 32 mmol/L Final    ANION GAP 05/19/2021 9  4 - 13 mmol/L Final    BUN 05/19/2021 8  5 - 25 mg/dL Final    Creatinine 05/19/2021 0 85  0 60 - 1 30 mg/dL Final    Standardized to IDMS reference method    Glucose, Fasting 05/19/2021 127* 65 - 99 mg/dL Final    Specimen collection should occur prior to Sulfasalazine administration due to the potential for falsely depressed results  Specimen collection should occur prior to Sulfapyridine administration due to the potential for falsely elevated results   Calcium 05/19/2021 9 5  8 3 - 10 1 mg/dL Final    AST 05/19/2021 11  5 - 45 U/L Final    Specimen collection should occur prior to Sulfasalazine administration due to the potential for falsely depressed results   ALT 05/19/2021 21  12 - 78 U/L Final    Specimen collection should occur prior to Sulfasalazine and/or Sulfapyridine administration due to the potential for falsely depressed results   Alkaline Phosphatase 05/19/2021 92  46 - 116 U/L Final    Total Protein 05/19/2021 7 1  6 4 - 8 2 g/dL Final    Albumin 05/19/2021 4 1  3 5 - 5 0 g/dL Final    Total Bilirubin 05/19/2021 1 08* 0 20 - 1 00 mg/dL Final    Use of this assay is not recommended for patients undergoing treatment with eltrombopag due to the potential for falsely elevated results      eGFR 05/19/2021 73  ml/min/1 73sq m Final    WBC 05/19/2021 7 50  4 31 - 10 16 Thousand/uL Final    RBC 05/19/2021 5 10  3 81 - 5 12 Million/uL Final    Hemoglobin 05/19/2021 16 1* 11 5 - 15 4 g/dL Final    Hematocrit 05/19/2021 46 4* 34 8 - 46 1 % Final    MCV 05/19/2021 91  82 - 98 fL Final    MCH 05/19/2021 31 6  26 8 - 34 3 pg Final    MCHC 05/19/2021 34 7  31 4 - 37 4 g/dL Final    RDW 05/19/2021 12 3  11 6 - 15 1 % Final    MPV 05/19/2021 9 7  8 9 - 12 7 fL Final    Platelets 92/04/1602 259  149 - 390 Thousands/uL Final    nRBC 05/19/2021 0  /100 WBCs Final    Neutrophils Relative 05/19/2021 49  43 - 75 % Final    Immat GRANS % 05/19/2021 0  0 - 2 % Final    Lymphocytes Relative 05/19/2021 35  14 - 44 % Final    Monocytes Relative 05/19/2021 7  4 - 12 % Final    Eosinophils Relative 05/19/2021 8* 0 - 6 % Final    Basophils Relative 05/19/2021 1  0 - 1 % Final    Neutrophils Absolute 05/19/2021 3 65  1 85 - 7 62 Thousands/µL Final    Immature Grans Absolute 05/19/2021 0 02  0 00 - 0 20 Thousand/uL Final    Lymphocytes Absolute 05/19/2021 2 59  0 60 - 4 47 Thousands/µL Final    Monocytes Absolute 05/19/2021 0 53  0 17 - 1 22 Thousand/µL Final    Eosinophils Absolute 05/19/2021 0 63* 0 00 - 0 61 Thousand/µL Final    Basophils Absolute 05/19/2021 0 08  0 00 - 0 10 Thousands/µL Final    TSH 3RD GENERATON 05/19/2021 2 750  0 358 - 3 740 uIU/mL Final    The recommended reference ranges for TSH during pregnancy are as follows:   First trimester 0 1 to 2 5 uIU/mL   Second trimester  0 2 to 3 0 uIU/mL   Third trimester 0 3 to 3 0 uIU/m    Note: Normal ranges may not apply to patients who are transgender, non-binary, or whose legal sex, sex at birth, and gender identity differ   Cholesterol 05/19/2021 249* 50 - 200 mg/dL Final    Cholesterol:       Desirable         <200 mg/dl       Borderline         200-239 mg/dl       High              >239           Triglycerides 05/19/2021 135  <=150 mg/dL Final    Triglyceride:     Normal          <150 mg/dl     Borderline High 150-199 mg/dl     High            200-499 mg/dl        Very High       >499 mg/dl    Specimen collection should occur prior to N-Acetylcysteine or Metamizole administration due to the potential for falsely depressed results   HDL, Direct 05/19/2021 63  >=40 mg/dL Final    HDL Cholesterol:       Low     <41 mg/dL  Specimen collection should occur prior to Metamizole administration due to the potential for falsley depressed results   LDL Calculated 05/19/2021 159* 0 - 100 mg/dL Final    LDL Cholesterol:     Optimal           <100 mg/dl     Near Optimal      100-129 mg/dl     Above Optimal       Borderline High 130-159 mg/dl       High            160-189 mg/dl       Very High       >189 mg/dl         This screening LDL is a calculated result     It does not have the accuracy of the Direct Measured LDL in the monitoring of patients with hyperlipidemia and/or statin therapy  Direct Measure LDL (KLJ455) must be ordered separately in these patients   Non-HDL-Chol (CHOL-HDL) 05/19/2021 186  mg/dl Final   Admission on 08/31/2020, Discharged on 08/31/2020   Component Date Value Ref Range Status    Sodium 08/31/2020 134* 136 - 145 mmol/L Final    Potassium 08/31/2020 3 6  3 5 - 5 3 mmol/L Final    Chloride 08/31/2020 99* 100 - 108 mmol/L Final    CO2 08/31/2020 28  21 - 32 mmol/L Final    ANION GAP 08/31/2020 7  4 - 13 mmol/L Final    BUN 08/31/2020 11  5 - 25 mg/dL Final    Creatinine 08/31/2020 0 87  0 60 - 1 30 mg/dL Final    Standardized to IDMS reference method    Glucose 08/31/2020 148* 65 - 140 mg/dL Final    If the patient is fasting, the ADA then defines impaired fasting glucose as > 100 mg/dL and diabetes as > or equal to 123 mg/dL  Specimen collection should occur prior to Sulfasalazine administration due to the potential for falsely depressed results  Specimen collection should occur prior to Sulfapyridine administration due to the potential for falsely elevated results   Glucose, Fasting 08/31/2020 148* 65 - 99 mg/dL Final    Specimen collection should occur prior to Sulfasalazine administration due to the potential for falsely depressed results  Specimen collection should occur prior to Sulfapyridine administration due to the potential for falsely elevated results      Calcium 08/31/2020 9 7  8 3 - 10 1 mg/dL Final    eGFR 08/31/2020 72  ml/min/1 73sq m Final    WBC 08/31/2020 11 71* 4 31 - 10 16 Thousand/uL Final    RBC 08/31/2020 4 69  3 81 - 5 12 Million/uL Final    Hemoglobin 08/31/2020 14 4  11 5 - 15 4 g/dL Final    Hematocrit 08/31/2020 42 6  34 8 - 46 1 % Final    MCV 08/31/2020 91  82 - 98 fL Final    MCH 08/31/2020 30 7  26 8 - 34 3 pg Final    MCHC 08/31/2020 33 8  31 4 - 37 4 g/dL Final    RDW 08/31/2020 12 2  11 6 - 15 1 % Final    Platelets 93/39/5591 348  149 - 390 Thousands/uL Final  MPV 08/31/2020 10 0  8 9 - 12 7 fL Final    Ventricular Rate 08/31/2020 93  BPM Final    Atrial Rate 08/31/2020 93  BPM Final    MA Interval 08/31/2020 160  ms Final    QRSD Interval 08/31/2020 78  ms Final    QT Interval 08/31/2020 360  ms Final    QTC Interval 08/31/2020 447  ms Final    P Axis 08/31/2020 75  degrees Final    QRS Axis 08/31/2020 59  degrees Final    T Wave Ernest 08/31/2020 55  degrees Final

## 2021-06-13 ENCOUNTER — APPOINTMENT (OUTPATIENT)
Dept: LAB | Age: 66
End: 2021-06-13
Payer: COMMERCIAL

## 2021-06-13 DIAGNOSIS — E87.1 HYPONATREMIA: ICD-10-CM

## 2021-06-13 DIAGNOSIS — R73.9 HYPERGLYCEMIA: ICD-10-CM

## 2021-06-13 LAB
ANION GAP SERPL CALCULATED.3IONS-SCNC: 6 MMOL/L (ref 4–13)
BUN SERPL-MCNC: 9 MG/DL (ref 5–25)
CALCIUM SERPL-MCNC: 9.5 MG/DL (ref 8.3–10.1)
CHLORIDE SERPL-SCNC: 98 MMOL/L (ref 100–108)
CO2 SERPL-SCNC: 30 MMOL/L (ref 21–32)
CREAT SERPL-MCNC: 0.91 MG/DL (ref 0.6–1.3)
EST. AVERAGE GLUCOSE BLD GHB EST-MCNC: 123 MG/DL
GFR SERPL CREATININE-BSD FRML MDRD: 67 ML/MIN/1.73SQ M
GLUCOSE P FAST SERPL-MCNC: 122 MG/DL (ref 65–99)
HBA1C MFR BLD: 5.9 %
POTASSIUM SERPL-SCNC: 3.9 MMOL/L (ref 3.5–5.3)
SODIUM SERPL-SCNC: 134 MMOL/L (ref 136–145)

## 2021-06-13 PROCEDURE — 83036 HEMOGLOBIN GLYCOSYLATED A1C: CPT

## 2021-06-13 PROCEDURE — 80048 BASIC METABOLIC PNL TOTAL CA: CPT

## 2021-06-13 PROCEDURE — 36415 COLL VENOUS BLD VENIPUNCTURE: CPT

## 2021-06-15 ENCOUNTER — TELEPHONE (OUTPATIENT)
Dept: INTERNAL MEDICINE CLINIC | Age: 66
End: 2021-06-15

## 2021-06-15 PROBLEM — R73.03 PREDIABETES: Status: ACTIVE | Noted: 2021-05-05

## 2021-06-15 NOTE — TELEPHONE ENCOUNTER
I called and discussed patient's lab results with her  We will keep monitoring her sodium level which is likely secondary to her diuretics but fortunately is not too low and she was counseled to continue to diet and exercise with regards to her prediabetes which she has already started doing

## 2021-06-17 ENCOUNTER — IMMUNIZATIONS (OUTPATIENT)
Dept: FAMILY MEDICINE CLINIC | Facility: HOSPITAL | Age: 66
End: 2021-06-17

## 2021-06-17 DIAGNOSIS — Z23 ENCOUNTER FOR IMMUNIZATION: Primary | ICD-10-CM

## 2021-06-17 PROCEDURE — 0001A SARS-COV-2 / COVID-19 MRNA VACCINE (PFIZER-BIONTECH) 30 MCG: CPT

## 2021-06-17 PROCEDURE — 91300 SARS-COV-2 / COVID-19 MRNA VACCINE (PFIZER-BIONTECH) 30 MCG: CPT

## 2021-07-07 ENCOUNTER — VBI (OUTPATIENT)
Dept: ADMINISTRATIVE | Facility: OTHER | Age: 66
End: 2021-07-07

## 2021-07-12 ENCOUNTER — HOSPITAL ENCOUNTER (OUTPATIENT)
Dept: RADIOLOGY | Facility: IMAGING CENTER | Age: 66
Discharge: HOME | End: 2021-07-12
Payer: COMMERCIAL

## 2021-07-12 VITALS — HEIGHT: 66 IN | WEIGHT: 155 LBS | BODY MASS INDEX: 24.91 KG/M2

## 2021-07-12 DIAGNOSIS — Z12.39 BREAST CANCER SCREENING: ICD-10-CM

## 2021-07-12 PROCEDURE — 77067 SCR MAMMO BI INCL CAD: CPT

## 2021-07-12 PROCEDURE — 77063 BREAST TOMOSYNTHESIS BI: CPT

## 2021-07-14 ENCOUNTER — IMMUNIZATIONS (OUTPATIENT)
Dept: FAMILY MEDICINE CLINIC | Facility: HOSPITAL | Age: 66
End: 2021-07-14

## 2021-07-14 DIAGNOSIS — Z23 ENCOUNTER FOR IMMUNIZATION: Primary | ICD-10-CM

## 2021-07-14 PROCEDURE — 91300 SARS-COV-2 / COVID-19 MRNA VACCINE (PFIZER-BIONTECH) 30 MCG: CPT

## 2021-07-14 PROCEDURE — 0002A SARS-COV-2 / COVID-19 MRNA VACCINE (PFIZER-BIONTECH) 30 MCG: CPT

## 2021-07-22 ENCOUNTER — VBI (OUTPATIENT)
Dept: ADMINISTRATIVE | Facility: OTHER | Age: 66
End: 2021-07-22

## 2021-07-30 ENCOUNTER — VBI (OUTPATIENT)
Dept: ADMINISTRATIVE | Facility: OTHER | Age: 66
End: 2021-07-30

## 2021-09-01 ENCOUNTER — APPOINTMENT (OUTPATIENT)
Dept: LAB | Age: 66
End: 2021-09-01
Payer: COMMERCIAL

## 2021-09-01 DIAGNOSIS — E78.49 OTHER HYPERLIPIDEMIA: ICD-10-CM

## 2021-09-01 LAB
CHOLEST SERPL-MCNC: 253 MG/DL (ref 50–200)
HDLC SERPL-MCNC: 61 MG/DL
LDLC SERPL CALC-MCNC: 174 MG/DL (ref 0–100)
NONHDLC SERPL-MCNC: 192 MG/DL
TRIGL SERPL-MCNC: 89 MG/DL

## 2021-09-01 PROCEDURE — 80061 LIPID PANEL: CPT

## 2021-09-01 PROCEDURE — 36415 COLL VENOUS BLD VENIPUNCTURE: CPT

## 2021-09-09 ENCOUNTER — OFFICE VISIT (OUTPATIENT)
Dept: INTERNAL MEDICINE CLINIC | Age: 66
End: 2021-09-09
Payer: COMMERCIAL

## 2021-09-09 VITALS
DIASTOLIC BLOOD PRESSURE: 72 MMHG | SYSTOLIC BLOOD PRESSURE: 130 MMHG | BODY MASS INDEX: 22.93 KG/M2 | HEART RATE: 70 BPM | HEIGHT: 66 IN | TEMPERATURE: 97.8 F | OXYGEN SATURATION: 95 % | WEIGHT: 142.7 LBS

## 2021-09-09 DIAGNOSIS — R06.00 DYSPNEA ON EXERTION: ICD-10-CM

## 2021-09-09 DIAGNOSIS — T50.905A MEDICATION SIDE EFFECT, INITIAL ENCOUNTER: ICD-10-CM

## 2021-09-09 DIAGNOSIS — I10 ESSENTIAL HYPERTENSION: Primary | ICD-10-CM

## 2021-09-09 DIAGNOSIS — F32.9 REACTIVE DEPRESSION: ICD-10-CM

## 2021-09-09 DIAGNOSIS — R73.03 PREDIABETES: ICD-10-CM

## 2021-09-09 DIAGNOSIS — F12.90 MARIJUANA USE: ICD-10-CM

## 2021-09-09 DIAGNOSIS — E78.49 OTHER HYPERLIPIDEMIA: ICD-10-CM

## 2021-09-09 DIAGNOSIS — L60.9 NAIL ABNORMALITIES: ICD-10-CM

## 2021-09-09 PROCEDURE — 1036F TOBACCO NON-USER: CPT | Performed by: INTERNAL MEDICINE

## 2021-09-09 PROCEDURE — 3008F BODY MASS INDEX DOCD: CPT | Performed by: INTERNAL MEDICINE

## 2021-09-09 PROCEDURE — 99214 OFFICE O/P EST MOD 30 MIN: CPT | Performed by: INTERNAL MEDICINE

## 2021-09-09 RX ORDER — VENLAFAXINE HYDROCHLORIDE 37.5 MG/1
37.5 CAPSULE, EXTENDED RELEASE ORAL
Qty: 60 CAPSULE | Refills: 1 | Status: SHIPPED | OUTPATIENT
Start: 2021-09-09 | End: 2021-12-30 | Stop reason: SDUPTHER

## 2021-09-09 RX ORDER — LOSARTAN POTASSIUM 100 MG/1
100 TABLET ORAL DAILY
Qty: 90 TABLET | Refills: 1 | Status: SHIPPED | OUTPATIENT
Start: 2021-09-09 | End: 2021-12-09 | Stop reason: SDUPTHER

## 2021-09-09 NOTE — PROGRESS NOTES
Assessment/Plan:    Essential hypertension   -well controlled   - will discontinue losartan -hydrochlorothiazide because of possible side effects and continue with losartan at an increased dose 100 milligrams daily   - patient was counseled to check her blood pressure twice a day, in the morning and in the evening and to keep a blood pressure log and to call the office if her blood pressure is consistently above 130/80  - if blood pressure remains elevated, will start on a low-dose of amlodipine in addition to losartan   - continue with a low-salt diet    Hyperlipidemia   -  Lipid panel is somewhat worse   Last lipid panel done on September 1st, 2021 showed a total cholesterol of 253, up from 249 on May 19th, 2021, triglyceride of 89, down from 135, HDL of 61, down from 63 and LDL of 174, down from 159  - calculated 10 year ASCVD risk is 6 8 percent with the recommendation for diet and exercise  - patient has been counseled that if her LDL goes up to 190, then she needs to be started on medication  - will recheck a lipid panel in 3 months   -continue with a heart healthy diet and with exercise    Prediabetes   -  Patient has been dieting and exercising   -will recheck a BMP in 3 months    Nail abnormality  -will refer patient to Dermatology ASAP  - I explained to her that I would prefer not to start her on such as strong steroid cream( clobetasol) until dermatology has seen her    Dyspnea on exertion  -  Well controlled on p r n  albuterol inhaler   -possibly reactive airway disease, possible COPD versus asthma   - patient will require a PFT for her diagnosis to be made   -continue with albuterol inhaler as needed    Reactive depression  - will start patient on Effexor at 37 5 milligrams and she was counseled to increase the dose to 75 milligrams in about 2 weeks if her symptoms are not improving adequately  - she was counseled that it would take about 4-6 weeks for the full effect to be felt  -patient was counseled not to discontinue this medication abruptly but to call the office for instructions to taper it off if she decides to stop taking it  Medication side effects   - patient's rash on sun exposure is likely secondary to hydrochlorothiazide   -will discontinue hydrochlorothiazide and increase the dose of losartan    The 10-year ASCVD risk score (Melissa Little et al , 2013) is: 6 8%    Values used to calculate the score:      Age: 63 years      Sex: Female      Is Non- : No      Diabetic: No      Tobacco smoker: No      Systolic Blood Pressure: 490 mmHg      Is BP treated: Yes      HDL Cholesterol: 61 mg/dL      Total Cholesterol: 253 mg/dL       Diagnoses and all orders for this visit:    Essential hypertension  -     losartan (Cozaar) 100 MG tablet; Take 1 tablet (100 mg total) by mouth daily  -     Basic metabolic panel; Future    Other hyperlipidemia  -     Lipid panel; Future    Prediabetes  -     Basic metabolic panel; Future    Nail abnormalities  -     Ambulatory referral to Dermatology; Future    Reactive depression  -     venlafaxine (EFFEXOR-XR) 37 5 mg 24 hr capsule; Take 1 capsule (37 5 mg total) by mouth daily with breakfast    Marijuana use    Dyspnea on exertion    Medication side effect, initial encounter          Subjective:      Patient ID: Jonathan Moran is a 63 y o  female  HPI   Patient presents for a follow-up visit regarding her essential hypertension, hyperlipidemia, prediabetes, marijuana use  and dyspnea on exertion  She had blood work done recently and wants to review the results  She states that she has been working on her diet and exercise and has lost some weight since her last visit  She also complains of a rash that started when she went into the sun which she believes might be secondary to her blood pressure medication  She states that her sister uses amlodipine and she may consider switching to the medication    Patient also complains of worsening depression and anxiety especially since her  is getting sicker and is dying and 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  She states that she used to be on Effexor in the past and would like to try that again  She admits to feelings of depression, anxiety, fatigue, poor sleep, but denies any feelings of hopelessness or guilt and there is no report of suicidal or homicidal ideation  She admits to diaphoresis occasionally as well as cough and occasional wheezing and states that she still smokes marijuana but has stopped smoking cigarettes  She denies fever, chills, headaches, dizziness, nausea, vomiting, abdominal pain, diarrhea, constipation, myalgias, arthralgias  She complains of a lesion in her nails that she used to have in the past and she states that she has been to see a dermatologist and had biopsies done and they told her that it was not a fungal infection  She states that she saw them years ago  She believes that this might be psoriasis and wants a prescription for a steroid cream     The following portions of the patient's history were reviewed and updated as appropriate:   She  has a past medical history of Asthma, History of transfusion, Hypertension, and PONV (postoperative nausea and vomiting)    She   Patient Active Problem List    Diagnosis Date Noted    Nail abnormalities 09/09/2021    Reactive depression 09/09/2021    Medication side effect, initial encounter 09/09/2021    Dyspnea on exertion 06/02/2021    Hyponatremia 06/02/2021    Other hyperlipidemia 05/05/2021    Prediabetes 05/05/2021    Marijuana use 05/05/2021    Aftercare following surgery 10/08/2020    Smoking 08/31/2020    Essential hypertension 08/27/2020    Annual physical exam 08/27/2020    Closed fracture of olecranon process of right ulna 08/27/2020    Fall 08/26/2020    Trauma to multiple areas of the body  08/26/2020    Pain and swelling of upper extremity, right 08/26/2020    Bruising of multiple areas 08/26/2020    Right elbow pain 08/26/2020    Breast cancer screening 08/26/2020    Colon cancer screening 08/26/2020     She  has a past surgical history that includes Bunionectomy; Tubal ligation; and ELBOW FRACTURE REPAIR (Right, 8/31/2020)  Her family history includes Hypertension in her mother; Kidney cancer in her father; Lung cancer (age of onset: 52) in her father; Melanoma (age of onset: 25) in her sister; No Known Problems in her brother, maternal aunt, maternal aunt, maternal aunt, maternal aunt, maternal aunt, maternal aunt, maternal aunt, maternal aunt, maternal grandfather, maternal grandmother, paternal aunt, paternal aunt, paternal grandfather, and paternal grandmother; Parkinsonism in her mother  She  reports that she has quit smoking  Her smoking use included cigarettes  She has never used smokeless tobacco  She reports current alcohol use of about 7 0 standard drinks of alcohol per week  She reports current drug use  Drug: Marijuana  Current Outpatient Medications   Medication Sig Dispense Refill    albuterol (PROVENTIL HFA,VENTOLIN HFA) 90 mcg/act inhaler Inhale 2 puffs every 6 (six) hours as needed for wheezing or shortness of breath 8 g 0    acetaminophen (TYLENOL) 500 mg tablet Take 1,000 mg by mouth every 6 (six) hours as needed for mild pain (Patient not taking: Reported on 9/9/2021)      Ibuprofen (ADVIL PO) Take by mouth as needed (Patient not taking: Reported on 9/9/2021)      losartan (Cozaar) 100 MG tablet Take 1 tablet (100 mg total) by mouth daily 90 tablet 1    venlafaxine (EFFEXOR-XR) 37 5 mg 24 hr capsule Take 1 capsule (37 5 mg total) by mouth daily with breakfast 60 capsule 1     No current facility-administered medications for this visit       Current Outpatient Medications on File Prior to Visit   Medication Sig    albuterol (PROVENTIL HFA,VENTOLIN HFA) 90 mcg/act inhaler Inhale 2 puffs every 6 (six) hours as needed for wheezing or shortness of breath    [DISCONTINUED] losartan-hydrochlorothiazide (HYZAAR) 50-12 5 mg per tablet Take 1 tablet by mouth daily    acetaminophen (TYLENOL) 500 mg tablet Take 1,000 mg by mouth every 6 (six) hours as needed for mild pain (Patient not taking: Reported on 9/9/2021)    Ibuprofen (ADVIL PO) Take by mouth as needed (Patient not taking: Reported on 9/9/2021)     No current facility-administered medications on file prior to visit  She has No Known Allergies       Review of Systems   Constitutional: Positive for diaphoresis (night sweats) and fatigue  Negative for activity change, appetite change, chills, fever and unexpected weight change (has been working on wt loss)  HENT: Negative for ear pain, postnasal drip, rhinorrhea, sinus pressure and sore throat  Eyes: Negative for pain  Respiratory: Positive for cough and wheezing (occasionally)  Negative for choking, chest tightness and shortness of breath  Cardiovascular: Negative for chest pain, palpitations and leg swelling  Gastrointestinal: Negative for abdominal pain, constipation, diarrhea, nausea and vomiting  Genitourinary: Negative for dysuria and hematuria  Musculoskeletal: Negative for arthralgias, back pain, gait problem, joint swelling, myalgias and neck stiffness  Skin: Negative for pallor and rash  Nail abnormalities    Neurological: Negative for dizziness, tremors, seizures, syncope, light-headedness and headaches  Hematological: Negative for adenopathy  Psychiatric/Behavioral: Positive for dysphoric mood (has used effexor in the past and it helped ) and sleep disturbance (improving on melatonin)  Negative for behavioral problems  The patient is nervous/anxious            Objective:      /72 (BP Location: Left arm, Patient Position: Sitting, Cuff Size: Standard)   Pulse 70   Temp 97 8 °F (36 6 °C) (Temporal)   Ht 5' 5 5" (1 664 m)   Wt 64 7 kg (142 lb 11 2 oz)   SpO2 95%   BMI 23 39 kg/m²          Physical Exam  Constitutional:       General: She is not in acute distress  Appearance: She is well-developed  She is not diaphoretic  HENT:      Head: Normocephalic and atraumatic  Right Ear: External ear normal       Left Ear: External ear normal       Nose: Nose normal       Mouth/Throat:      Pharynx: Posterior oropharyngeal erythema ( oropharyngeal erythema with dry mucous membranes) present  No oropharyngeal exudate  Eyes:      General: No scleral icterus  Right eye: No discharge  Left eye: No discharge  Conjunctiva/sclera: Conjunctivae normal       Pupils: Pupils are equal, round, and reactive to light  Neck:      Thyroid: No thyromegaly  Vascular: No JVD  Trachea: No tracheal deviation  Cardiovascular:      Rate and Rhythm: Normal rate and regular rhythm  Heart sounds: Normal heart sounds  No murmur heard  No friction rub  No gallop  Pulmonary:      Effort: Pulmonary effort is normal  No respiratory distress  Breath sounds: Normal breath sounds  No wheezing or rales  Chest:      Chest wall: No tenderness  Abdominal:      General: Bowel sounds are normal  There is no distension  Palpations: Abdomen is soft  There is no mass  Tenderness: There is no abdominal tenderness  There is no guarding or rebound  Musculoskeletal:         General: No tenderness or deformity  Normal range of motion  Cervical back: Normal range of motion and neck supple  Lymphadenopathy:      Cervical: No cervical adenopathy  Skin:     General: Skin is warm and dry  Coloration: Skin is not pale  Findings: Lesion ( thick, curved, hyperpigmented and heaped nails on the bilateral thumbs) and rash ( papular rash on bilateral upper extremity) present  No erythema  Neurological:      Mental Status: She is alert and oriented to person, place, and time  Cranial Nerves: No cranial nerve deficit  Motor: No abnormal muscle tone        Coordination: Coordination normal       Deep Tendon Reflexes: Reflexes are normal and symmetric  Psychiatric:         Mood and Affect: Mood is anxious and depressed ( depressed and anxious affect, tearful)  Affect is tearful  Behavior: Behavior normal            Appointment on 09/01/2021   Component Date Value Ref Range Status    Cholesterol 09/01/2021 253* 50 - 200 mg/dL Final    Cholesterol:       Desirable         <200 mg/dl       Borderline         200-239 mg/dl       High              >239           Triglycerides 09/01/2021 89  <=150 mg/dL Final    Triglyceride:     Normal          <150 mg/dl     Borderline High 150-199 mg/dl     High            200-499 mg/dl        Very High       >499 mg/dl    Specimen collection should occur prior to N-Acetylcysteine or Metamizole administration due to the potential for falsely depressed results   HDL, Direct 09/01/2021 61  >=40 mg/dL Final    HDL Cholesterol:       Low     <41 mg/dL  Specimen collection should occur prior to Metamizole administration due to the potential for falsley depressed results   LDL Calculated 09/01/2021 174* 0 - 100 mg/dL Final    LDL Cholesterol:     Optimal           <100 mg/dl     Near Optimal      100-129 mg/dl     Above Optimal       Borderline High 130-159 mg/dl       High            160-189 mg/dl       Very High       >189 mg/dl         This screening LDL is a calculated result  It does not have the accuracy of the Direct Measured LDL in the monitoring of patients with hyperlipidemia and/or statin therapy  Direct Measure LDL (UUC727) must be ordered separately in these patients   Non-HDL-Chol (CHOL-HDL) 09/01/2021 192  mg/dl Final   Appointment on 06/13/2021   Component Date Value Ref Range Status    Hemoglobin A1C 06/13/2021 5 9* Normal 3 8-5 6%; PreDiabetic 5 7-6 4%;  Diabetic >=6 5%; Glycemic control for adults with diabetes <7 0% % Final    EAG 06/13/2021 123  mg/dl Final    Sodium 06/13/2021 134* 136 - 145 mmol/L Final    Potassium 06/13/2021 3 9  3 5 - 5 3 mmol/L Final    Chloride 06/13/2021 98* 100 - 108 mmol/L Final    CO2 06/13/2021 30  21 - 32 mmol/L Final    ANION GAP 06/13/2021 6  4 - 13 mmol/L Final    BUN 06/13/2021 9  5 - 25 mg/dL Final    Creatinine 06/13/2021 0 91  0 60 - 1 30 mg/dL Final    Standardized to IDMS reference method    Glucose, Fasting 06/13/2021 122* 65 - 99 mg/dL Final    Specimen collection should occur prior to Sulfasalazine administration due to the potential for falsely depressed results  Specimen collection should occur prior to Sulfapyridine administration due to the potential for falsely elevated results   Calcium 06/13/2021 9 5  8 3 - 10 1 mg/dL Final    eGFR 06/13/2021 67  ml/min/1 73sq m Final   Appointment on 05/19/2021   Component Date Value Ref Range Status    Hepatitis C Ab 05/19/2021 Non-reactive  Non-reactive Final    Sodium 05/19/2021 133* 136 - 145 mmol/L Final    Potassium 05/19/2021 3 9  3 5 - 5 3 mmol/L Final    Chloride 05/19/2021 94* 100 - 108 mmol/L Final    CO2 05/19/2021 30  21 - 32 mmol/L Final    ANION GAP 05/19/2021 9  4 - 13 mmol/L Final    BUN 05/19/2021 8  5 - 25 mg/dL Final    Creatinine 05/19/2021 0 85  0 60 - 1 30 mg/dL Final    Standardized to IDMS reference method    Glucose, Fasting 05/19/2021 127* 65 - 99 mg/dL Final    Specimen collection should occur prior to Sulfasalazine administration due to the potential for falsely depressed results  Specimen collection should occur prior to Sulfapyridine administration due to the potential for falsely elevated results   Calcium 05/19/2021 9 5  8 3 - 10 1 mg/dL Final    AST 05/19/2021 11  5 - 45 U/L Final    Specimen collection should occur prior to Sulfasalazine administration due to the potential for falsely depressed results       ALT 05/19/2021 21  12 - 78 U/L Final    Specimen collection should occur prior to Sulfasalazine and/or Sulfapyridine administration due to the potential for falsely depressed results   Alkaline Phosphatase 05/19/2021 92  46 - 116 U/L Final    Total Protein 05/19/2021 7 1  6 4 - 8 2 g/dL Final    Albumin 05/19/2021 4 1  3 5 - 5 0 g/dL Final    Total Bilirubin 05/19/2021 1 08* 0 20 - 1 00 mg/dL Final    Use of this assay is not recommended for patients undergoing treatment with eltrombopag due to the potential for falsely elevated results      eGFR 05/19/2021 73  ml/min/1 73sq m Final    WBC 05/19/2021 7 50  4 31 - 10 16 Thousand/uL Final    RBC 05/19/2021 5 10  3 81 - 5 12 Million/uL Final    Hemoglobin 05/19/2021 16 1* 11 5 - 15 4 g/dL Final    Hematocrit 05/19/2021 46 4* 34 8 - 46 1 % Final    MCV 05/19/2021 91  82 - 98 fL Final    MCH 05/19/2021 31 6  26 8 - 34 3 pg Final    MCHC 05/19/2021 34 7  31 4 - 37 4 g/dL Final    RDW 05/19/2021 12 3  11 6 - 15 1 % Final    MPV 05/19/2021 9 7  8 9 - 12 7 fL Final    Platelets 31/17/5878 259  149 - 390 Thousands/uL Final    nRBC 05/19/2021 0  /100 WBCs Final    Neutrophils Relative 05/19/2021 49  43 - 75 % Final    Immat GRANS % 05/19/2021 0  0 - 2 % Final    Lymphocytes Relative 05/19/2021 35  14 - 44 % Final    Monocytes Relative 05/19/2021 7  4 - 12 % Final    Eosinophils Relative 05/19/2021 8* 0 - 6 % Final    Basophils Relative 05/19/2021 1  0 - 1 % Final    Neutrophils Absolute 05/19/2021 3 65  1 85 - 7 62 Thousands/µL Final    Immature Grans Absolute 05/19/2021 0 02  0 00 - 0 20 Thousand/uL Final    Lymphocytes Absolute 05/19/2021 2 59  0 60 - 4 47 Thousands/µL Final    Monocytes Absolute 05/19/2021 0 53  0 17 - 1 22 Thousand/µL Final    Eosinophils Absolute 05/19/2021 0 63* 0 00 - 0 61 Thousand/µL Final    Basophils Absolute 05/19/2021 0 08  0 00 - 0 10 Thousands/µL Final    TSH 3RD GENERATON 05/19/2021 2 750  0 358 - 3 740 uIU/mL Final    The recommended reference ranges for TSH during pregnancy are as follows:   First trimester 0 1 to 2 5 uIU/mL   Second trimester  0 2 to 3 0 uIU/mL   Third trimester 0 3 to 3 0 uIU/m    Note: Normal ranges may not apply to patients who are transgender, non-binary, or whose legal sex, sex at birth, and gender identity differ   Cholesterol 05/19/2021 249* 50 - 200 mg/dL Final    Cholesterol:       Desirable         <200 mg/dl       Borderline         200-239 mg/dl       High              >239           Triglycerides 05/19/2021 135  <=150 mg/dL Final    Triglyceride:     Normal          <150 mg/dl     Borderline High 150-199 mg/dl     High            200-499 mg/dl        Very High       >499 mg/dl    Specimen collection should occur prior to N-Acetylcysteine or Metamizole administration due to the potential for falsely depressed results   HDL, Direct 05/19/2021 63  >=40 mg/dL Final    HDL Cholesterol:       Low     <41 mg/dL  Specimen collection should occur prior to Metamizole administration due to the potential for falsley depressed results   LDL Calculated 05/19/2021 159* 0 - 100 mg/dL Final    LDL Cholesterol:     Optimal           <100 mg/dl     Near Optimal      100-129 mg/dl     Above Optimal       Borderline High 130-159 mg/dl       High            160-189 mg/dl       Very High       >189 mg/dl         This screening LDL is a calculated result  It does not have the accuracy of the Direct Measured LDL in the monitoring of patients with hyperlipidemia and/or statin therapy  Direct Measure LDL (IVC165) must be ordered separately in these patients      Non-HDL-Chol (CHOL-HDL) 05/19/2021 186  mg/dl Final

## 2021-09-09 NOTE — PATIENT INSTRUCTIONS
PLEASE CHECK YOUR BLOOD PRESSURE TWICE A DAY, IN THE MORNING AND IN THE EVENING AND KEEP A BLOOD PRESSURE LOG  GOAL BLOOD PRESSURE /80 AND BELOW  PLEASE CALL THE OFFICE IF BLOOD PRESSURE IS CONSISTENTLY ABOVE 130/80

## 2021-09-29 ENCOUNTER — VBI (OUTPATIENT)
Dept: ADMINISTRATIVE | Facility: OTHER | Age: 66
End: 2021-09-29

## 2021-12-02 ENCOUNTER — APPOINTMENT (OUTPATIENT)
Dept: LAB | Age: 66
End: 2021-12-02
Payer: COMMERCIAL

## 2021-12-02 ENCOUNTER — RA CDI HCC (OUTPATIENT)
Dept: OTHER | Facility: HOSPITAL | Age: 66
End: 2021-12-02

## 2021-12-02 DIAGNOSIS — E78.49 OTHER HYPERLIPIDEMIA: ICD-10-CM

## 2021-12-02 DIAGNOSIS — I10 ESSENTIAL HYPERTENSION: ICD-10-CM

## 2021-12-02 DIAGNOSIS — R73.03 PREDIABETES: ICD-10-CM

## 2021-12-02 LAB
ANION GAP SERPL CALCULATED.3IONS-SCNC: 6 MMOL/L (ref 4–13)
BUN SERPL-MCNC: 10 MG/DL (ref 5–25)
CALCIUM SERPL-MCNC: 9.5 MG/DL (ref 8.3–10.1)
CHLORIDE SERPL-SCNC: 105 MMOL/L (ref 100–108)
CHOLEST SERPL-MCNC: 247 MG/DL
CO2 SERPL-SCNC: 26 MMOL/L (ref 21–32)
CREAT SERPL-MCNC: 0.84 MG/DL (ref 0.6–1.3)
GFR SERPL CREATININE-BSD FRML MDRD: 74 ML/MIN/1.73SQ M
GLUCOSE P FAST SERPL-MCNC: 143 MG/DL (ref 65–99)
HDLC SERPL-MCNC: 66 MG/DL
LDLC SERPL CALC-MCNC: 163 MG/DL (ref 0–100)
NONHDLC SERPL-MCNC: 181 MG/DL
POTASSIUM SERPL-SCNC: 4 MMOL/L (ref 3.5–5.3)
SODIUM SERPL-SCNC: 137 MMOL/L (ref 136–145)
TRIGL SERPL-MCNC: 89 MG/DL

## 2021-12-02 PROCEDURE — 80048 BASIC METABOLIC PNL TOTAL CA: CPT

## 2021-12-02 PROCEDURE — 80061 LIPID PANEL: CPT

## 2021-12-02 PROCEDURE — 36415 COLL VENOUS BLD VENIPUNCTURE: CPT

## 2021-12-09 ENCOUNTER — OFFICE VISIT (OUTPATIENT)
Dept: INTERNAL MEDICINE CLINIC | Age: 66
End: 2021-12-09
Payer: COMMERCIAL

## 2021-12-09 ENCOUNTER — APPOINTMENT (OUTPATIENT)
Dept: LAB | Age: 66
End: 2021-12-09
Payer: COMMERCIAL

## 2021-12-09 VITALS
HEIGHT: 66 IN | TEMPERATURE: 98.8 F | DIASTOLIC BLOOD PRESSURE: 94 MMHG | OXYGEN SATURATION: 94 % | BODY MASS INDEX: 23.16 KG/M2 | SYSTOLIC BLOOD PRESSURE: 168 MMHG | HEART RATE: 66 BPM | WEIGHT: 144.1 LBS

## 2021-12-09 DIAGNOSIS — Z12.11 ENCOUNTER FOR SCREENING FOR MALIGNANT NEOPLASM OF COLON: ICD-10-CM

## 2021-12-09 DIAGNOSIS — E87.1 HYPONATREMIA: ICD-10-CM

## 2021-12-09 DIAGNOSIS — R06.00 DYSPNEA ON EXERTION: ICD-10-CM

## 2021-12-09 DIAGNOSIS — R73.9 HYPERGLYCEMIA: ICD-10-CM

## 2021-12-09 DIAGNOSIS — L60.9 NAIL ABNORMALITIES: ICD-10-CM

## 2021-12-09 DIAGNOSIS — E78.49 OTHER HYPERLIPIDEMIA: ICD-10-CM

## 2021-12-09 DIAGNOSIS — I10 ESSENTIAL HYPERTENSION: Primary | ICD-10-CM

## 2021-12-09 DIAGNOSIS — Z28.21 INFLUENZA VACCINATION DECLINED BY PATIENT: ICD-10-CM

## 2021-12-09 DIAGNOSIS — F32.9 REACTIVE DEPRESSION: ICD-10-CM

## 2021-12-09 DIAGNOSIS — R73.03 PREDIABETES: ICD-10-CM

## 2021-12-09 DIAGNOSIS — F12.90 MARIJUANA USE: ICD-10-CM

## 2021-12-09 PROBLEM — T14.90XA TRAUMA: Status: RESOLVED | Noted: 2020-08-26 | Resolved: 2021-12-09
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
## 2021-12-09 LAB
EST. AVERAGE GLUCOSE BLD GHB EST-MCNC: 120 MG/DL
HBA1C MFR BLD: 5.8 %

## 2021-12-09 PROCEDURE — 36415 COLL VENOUS BLD VENIPUNCTURE: CPT

## 2021-12-09 PROCEDURE — 3725F SCREEN DEPRESSION PERFORMED: CPT | Performed by: INTERNAL MEDICINE

## 2021-12-09 PROCEDURE — 83036 HEMOGLOBIN GLYCOSYLATED A1C: CPT

## 2021-12-09 PROCEDURE — 1036F TOBACCO NON-USER: CPT | Performed by: INTERNAL MEDICINE

## 2021-12-09 PROCEDURE — 3008F BODY MASS INDEX DOCD: CPT | Performed by: INTERNAL MEDICINE

## 2021-12-09 PROCEDURE — 99215 OFFICE O/P EST HI 40 MIN: CPT | Performed by: INTERNAL MEDICINE

## 2021-12-09 RX ORDER — AMLODIPINE BESYLATE 5 MG/1
5 TABLET ORAL DAILY
Qty: 60 TABLET | Refills: 2 | Status: SHIPPED | OUTPATIENT
Start: 2021-12-09 | End: 2022-06-07 | Stop reason: SDUPTHER

## 2021-12-09 RX ORDER — CLOBETASOL PROPIONATE 0.5 MG/G
CREAM TOPICAL AS NEEDED
COMMUNITY
Start: 2021-09-16

## 2021-12-09 RX ORDER — LOSARTAN POTASSIUM 100 MG/1
100 TABLET ORAL DAILY
Qty: 90 TABLET | Refills: 1 | Status: SHIPPED | OUTPATIENT
Start: 2021-12-09 | End: 2022-06-07 | Stop reason: SDUPTHER

## 2021-12-10 ENCOUNTER — TELEPHONE (OUTPATIENT)
Dept: INTERNAL MEDICINE CLINIC | Age: 66
End: 2021-12-10

## 2021-12-16 ENCOUNTER — VBI (OUTPATIENT)
Dept: ADMINISTRATIVE | Facility: OTHER | Age: 66
End: 2021-12-16

## 2021-12-23 ENCOUNTER — TELEPHONE (OUTPATIENT)
Dept: OTHER | Facility: HOSPITAL | Age: 66
End: 2021-12-23

## 2021-12-30 DIAGNOSIS — F32.9 REACTIVE DEPRESSION: ICD-10-CM

## 2021-12-30 RX ORDER — VENLAFAXINE HYDROCHLORIDE 37.5 MG/1
37.5 CAPSULE, EXTENDED RELEASE ORAL
Qty: 90 CAPSULE | Refills: 1 | Status: SHIPPED | OUTPATIENT
Start: 2021-12-30 | End: 2022-07-15 | Stop reason: SDUPTHER

## 2022-03-03 ENCOUNTER — RA CDI HCC (OUTPATIENT)
Dept: OTHER | Facility: HOSPITAL | Age: 67
End: 2022-03-03

## 2022-03-03 NOTE — PROGRESS NOTES
[facility] 75  coding opportunities       Chart reviewed, no opportunity found: CHART REVIEWED, NO OPPORTUNITY FOUND                        Patients insurance company: [employer] (Medicare Advantage and Commercial)

## 2022-06-07 DIAGNOSIS — I10 ESSENTIAL HYPERTENSION: ICD-10-CM

## 2022-06-07 RX ORDER — LOSARTAN POTASSIUM 100 MG/1
100 TABLET ORAL DAILY
Qty: 90 TABLET | Refills: 0 | Status: SHIPPED | OUTPATIENT
Start: 2022-06-07 | End: 2022-07-15 | Stop reason: SDUPTHER

## 2022-06-07 RX ORDER — AMLODIPINE BESYLATE 5 MG/1
5 TABLET ORAL DAILY
Qty: 60 TABLET | Refills: 0 | Status: SHIPPED | OUTPATIENT
Start: 2022-06-07 | End: 2022-07-15 | Stop reason: SDUPTHER

## 2022-07-15 ENCOUNTER — OFFICE VISIT (OUTPATIENT)
Dept: INTERNAL MEDICINE CLINIC | Age: 67
End: 2022-07-15
Payer: COMMERCIAL

## 2022-07-15 ENCOUNTER — APPOINTMENT (OUTPATIENT)
Dept: LAB | Age: 67
End: 2022-07-15
Payer: COMMERCIAL

## 2022-07-15 VITALS
OXYGEN SATURATION: 93 % | HEIGHT: 66 IN | BODY MASS INDEX: 23.46 KG/M2 | WEIGHT: 146 LBS | HEART RATE: 82 BPM | TEMPERATURE: 98.3 F | DIASTOLIC BLOOD PRESSURE: 74 MMHG | SYSTOLIC BLOOD PRESSURE: 140 MMHG

## 2022-07-15 DIAGNOSIS — J45.20 MILD INTERMITTENT REACTIVE AIRWAY DISEASE WITHOUT COMPLICATION: ICD-10-CM

## 2022-07-15 DIAGNOSIS — F32.9 REACTIVE DEPRESSION: ICD-10-CM

## 2022-07-15 DIAGNOSIS — R06.09 DYSPNEA ON EXERTION: ICD-10-CM

## 2022-07-15 DIAGNOSIS — R73.9 HYPERGLYCEMIA: ICD-10-CM

## 2022-07-15 DIAGNOSIS — Z13.820 OSTEOPOROSIS SCREENING: ICD-10-CM

## 2022-07-15 DIAGNOSIS — E78.49 OTHER HYPERLIPIDEMIA: ICD-10-CM

## 2022-07-15 DIAGNOSIS — I10 ESSENTIAL HYPERTENSION: Primary | ICD-10-CM

## 2022-07-15 DIAGNOSIS — Z12.31 ENCOUNTER FOR SCREENING MAMMOGRAM FOR MALIGNANT NEOPLASM OF BREAST: ICD-10-CM

## 2022-07-15 PROBLEM — J45.909 REACTIVE AIRWAY DISEASE WITHOUT COMPLICATION: Status: ACTIVE | Noted: 2022-07-15

## 2022-07-15 LAB
CHOLEST SERPL-MCNC: 243 MG/DL
HDLC SERPL-MCNC: 68 MG/DL
LDLC SERPL CALC-MCNC: 157 MG/DL (ref 0–100)
NONHDLC SERPL-MCNC: 175 MG/DL
TRIGL SERPL-MCNC: 90 MG/DL

## 2022-07-15 PROCEDURE — 99215 OFFICE O/P EST HI 40 MIN: CPT | Performed by: INTERNAL MEDICINE

## 2022-07-15 PROCEDURE — 36415 COLL VENOUS BLD VENIPUNCTURE: CPT

## 2022-07-15 PROCEDURE — 3725F SCREEN DEPRESSION PERFORMED: CPT | Performed by: INTERNAL MEDICINE

## 2022-07-15 PROCEDURE — 3077F SYST BP >= 140 MM HG: CPT | Performed by: INTERNAL MEDICINE

## 2022-07-15 PROCEDURE — 80061 LIPID PANEL: CPT

## 2022-07-15 PROCEDURE — 3078F DIAST BP <80 MM HG: CPT | Performed by: INTERNAL MEDICINE

## 2022-07-15 RX ORDER — LOSARTAN POTASSIUM 100 MG/1
100 TABLET ORAL DAILY
Qty: 90 TABLET | Refills: 1 | Status: SHIPPED | OUTPATIENT
Start: 2022-07-15

## 2022-07-15 RX ORDER — AMLODIPINE BESYLATE 5 MG/1
5 TABLET ORAL DAILY
Qty: 90 TABLET | Refills: 1 | Status: SHIPPED | OUTPATIENT
Start: 2022-07-15

## 2022-07-15 RX ORDER — VENLAFAXINE HYDROCHLORIDE 37.5 MG/1
37.5 CAPSULE, EXTENDED RELEASE ORAL
Qty: 90 CAPSULE | Refills: 1 | Status: SHIPPED | OUTPATIENT
Start: 2022-07-15

## 2022-07-15 NOTE — PROGRESS NOTES
Assessment/Plan:    Essential hypertension  -stable  -continue with losartan and amlodipine  -will refill medications today as requested  -continue with a low-salt diet    Reactive depression  -stable  -continue with venlafaxine which we will refill today    Hyperlipidemia  -lipid panel done today showed a total cholesterol of 243, down from 247, triglyceride of 90 compared to 89, HDL of 68, up from 66 and an LDL of 157, down from 163 on December 2nd, 2021  -calculated 10 year ASCVD risk is 8 1% with the recommendation for statins  -patient declines to start a statin at this time and wants to work on diet and exercise for 6 more months even though she fully understands the risk of heart attack or stroke  Hyperglycemia  -stable  -continue with a diet low in carbohydrates and simple sugars    Reactive airway disease with dyspnea on exertion  -stable  - continue with p r n  albuterol inhaler  -patient was counseled to quit smoking marijuana    Osteoporosis screen  -patient has never had a DEXA scan  -will order DEXA scan today and she will get it done when she turns 67 in 5-6 months    Breast cancer screen  -will order mammogram today and follow up with the results      The 10-year ASCVD risk score (Caleb Stack et al , 2013) is: 8 1%    Values used to calculate the score:      Age: 61 years      Sex: Female      Is Non- : No      Diabetic: No      Tobacco smoker: No      Systolic Blood Pressure: 296 mmHg      Is BP treated: Yes      HDL Cholesterol: 68 mg/dL      Total Cholesterol: 243 mg/dL       Diagnoses and all orders for this visit:    Essential hypertension  -     amLODIPine (NORVASC) 5 mg tablet; Take 1 tablet (5 mg total) by mouth daily  -     losartan (Cozaar) 100 MG tablet; Take 1 tablet (100 mg total) by mouth daily    Hyperglycemia    Reactive depression  -     venlafaxine (EFFEXOR-XR) 37 5 mg 24 hr capsule;  Take 1 capsule (37 5 mg total) by mouth daily with breakfast    Encounter for screening mammogram for malignant neoplasm of breast  -     Mammo screening bilateral w 3d & cad; Future    Osteoporosis screening  -     DXA bone density spine hip and pelvis; Future    Dyspnea on exertion    Mild intermittent reactive airway disease without complication    Other hyperlipidemia             Subjective:      Patient ID: Frank Story is a 61 y o  female  HPI  Pt presents for a follow up visit regarding her essential hypertension, hyperlipidemia, hyperglycemia  She had her blood work for her lipid panel done earlier this morning and wants to review the results  She states that she has been under some stress recently  Her   a few months ago but they had been  for 6 weeks and he had ca for 2 years so they had time to prepare  Does not check her bp more than once a month and it is usually okay  Has been eating better  Cut down on salt and stopped coffee but usually drinks some caffeinated tea -about 2 cups of tea a day  Still has cough , with phlegm and occasional sob on exertion  Still smoking weed but states that she has cut down  Does not smoke cigarettes  Mood is okay  Did a lipid panel and calculated ascvd risk was 8 1 %  But pt does not want to be on a statin  She wants to try diet and exercise and knows the risks  No family hx of MI or CVA  She smoked for 30 years and quit 27 years ago  She states that is sick bruising has improved but she occasionally gets some dark spots on her skin  She needs a refill of her medications  She denies fever, chills, headache, dizziness, chest pain, palpitations, nausea, vomiting abdominal pain, diarrhea, constipation, myalgias, arthralgias  The following portions of the patient's history were reviewed and updated as appropriate:   She  has a past medical history of Asthma, History of transfusion, Hypertension, and PONV (postoperative nausea and vomiting)    She   Patient Active Problem List    Diagnosis Date Noted    Reactive airway disease without complication 05/51/5781    Hyperglycemia 12/09/2021    Influenza vaccination declined by patient 12/09/2021    Nail abnormalities 09/09/2021    Reactive depression 09/09/2021    Medication side effect, initial encounter 09/09/2021    Dyspnea on exertion 06/02/2021    Hyponatremia 06/02/2021    Other hyperlipidemia 05/05/2021    Prediabetes 05/05/2021    Marijuana use 05/05/2021    Aftercare following surgery 10/08/2020    Smoking 08/31/2020    Essential hypertension 08/27/2020    Annual physical exam 08/27/2020    Closed fracture of olecranon process of right ulna 08/27/2020    Pain and swelling of upper extremity, right 08/26/2020    Bruising of multiple areas 08/26/2020    Right elbow pain 08/26/2020    Breast cancer screening 08/26/2020    Colon cancer screening 08/26/2020     She  has a past surgical history that includes Bunionectomy; Tubal ligation; and ELBOW FRACTURE REPAIR (Right, 8/31/2020)  Her family history includes Hypertension in her mother; Kidney cancer in her father; Lung cancer (age of onset: 52) in her father; Melanoma (age of onset: 25) in her sister; No Known Problems in her brother, maternal aunt, maternal aunt, maternal aunt, maternal aunt, maternal aunt, maternal aunt, maternal aunt, maternal aunt, maternal grandfather, maternal grandmother, paternal aunt, paternal aunt, paternal grandfather, and paternal grandmother; Parkinsonism in her mother  She  reports that she has quit smoking  Her smoking use included cigarettes  She has a 30 00 pack-year smoking history  She has never used smokeless tobacco  She reports current alcohol use of about 7 0 standard drinks of alcohol per week  She reports current drug use  Drug: Marijuana    Current Outpatient Medications   Medication Sig Dispense Refill    albuterol (PROVENTIL HFA,VENTOLIN HFA) 90 mcg/act inhaler Inhale 2 puffs every 6 (six) hours as needed for wheezing or shortness of breath 8 g 0    amLODIPine (NORVASC) 5 mg tablet Take 1 tablet (5 mg total) by mouth daily 90 tablet 1    clobetasol (TEMOVATE) 0 05 % cream as needed      losartan (Cozaar) 100 MG tablet Take 1 tablet (100 mg total) by mouth daily 90 tablet 1    venlafaxine (EFFEXOR-XR) 37 5 mg 24 hr capsule Take 1 capsule (37 5 mg total) by mouth daily with breakfast 90 capsule 1     No current facility-administered medications for this visit  Current Outpatient Medications on File Prior to Visit   Medication Sig    albuterol (PROVENTIL HFA,VENTOLIN HFA) 90 mcg/act inhaler Inhale 2 puffs every 6 (six) hours as needed for wheezing or shortness of breath    clobetasol (TEMOVATE) 0 05 % cream as needed    [DISCONTINUED] amLODIPine (NORVASC) 5 mg tablet Take 1 tablet (5 mg total) by mouth daily    [DISCONTINUED] losartan (Cozaar) 100 MG tablet Take 1 tablet (100 mg total) by mouth daily    [DISCONTINUED] venlafaxine (EFFEXOR-XR) 37 5 mg 24 hr capsule Take 1 capsule (37 5 mg total) by mouth daily with breakfast     No current facility-administered medications on file prior to visit  She has No Known Allergies       Review of Systems   Constitutional: Positive for diaphoresis  Negative for activity change, chills, fatigue, fever and unexpected weight change  HENT: Negative for ear pain, postnasal drip, rhinorrhea, sinus pressure and sore throat  Eyes: Negative for pain  Respiratory: Positive for shortness of breath  Negative for cough, choking, chest tightness and wheezing  Cardiovascular: Negative for chest pain, palpitations and leg swelling  Gastrointestinal: Negative for abdominal pain, constipation, diarrhea, nausea and vomiting  Genitourinary: Negative for dysuria and hematuria  Musculoskeletal: Negative for arthralgias, back pain, gait problem, joint swelling, myalgias and neck stiffness  Skin: Negative for pallor and rash     Neurological: Negative for dizziness, tremors, seizures, syncope, light-headedness and headaches  Hematological: Negative for adenopathy  Psychiatric/Behavioral: Negative for behavioral problems  Objective:      /74 (BP Location: Left arm, Patient Position: Sitting, Cuff Size: Standard)   Pulse 82   Temp 98 3 °F (36 8 °C) (Temporal)   Ht 5' 5 5" (1 664 m)   Wt 66 2 kg (146 lb)   SpO2 93%   BMI 23 93 kg/m²          Physical Exam  Constitutional:       General: She is not in acute distress  Appearance: She is well-developed  She is not diaphoretic  HENT:      Head: Normocephalic and atraumatic  Right Ear: External ear normal       Left Ear: External ear normal       Nose: Nose normal       Mouth/Throat:      Mouth: Mucous membranes are dry  Pharynx: Posterior oropharyngeal erythema (Oropharyngeal erythema with dry mucous membranes) present  No oropharyngeal exudate  Eyes:      General: No scleral icterus  Right eye: No discharge  Left eye: No discharge  Conjunctiva/sclera: Conjunctivae normal       Pupils: Pupils are equal, round, and reactive to light  Neck:      Thyroid: No thyromegaly  Vascular: No JVD  Trachea: No tracheal deviation  Cardiovascular:      Rate and Rhythm: Normal rate and regular rhythm  Heart sounds: Normal heart sounds  No murmur heard  No friction rub  No gallop  Pulmonary:      Effort: Pulmonary effort is normal  No respiratory distress  Breath sounds: Examination of the right-upper field reveals wheezing and rhonchi  Examination of the right-lower field reveals rales  Examination of the left-lower field reveals wheezing and rhonchi  Decreased breath sounds ( decreased breath sounds), wheezing ( scattered wheezes and rhonchi), rhonchi and rales ( rales in the right lung base, cleared by coughing) present  Chest:      Chest wall: No tenderness  Abdominal:      General: Bowel sounds are normal  There is no distension  Palpations: Abdomen is soft  There is no mass  Tenderness: There is no abdominal tenderness  There is no guarding or rebound  Musculoskeletal:         General: No tenderness or deformity  Normal range of motion  Cervical back: Normal range of motion and neck supple  Lymphadenopathy:      Cervical: No cervical adenopathy  Skin:     General: Skin is warm and dry  Coloration: Skin is not pale  Findings: Lesion (Patient has some hyperpigmented skin lesions on her hands) present  No erythema or rash  Neurological:      Mental Status: She is alert and oriented to person, place, and time  Cranial Nerves: No cranial nerve deficit  Motor: No abnormal muscle tone  Coordination: Coordination normal       Deep Tendon Reflexes: Reflexes are normal and symmetric  Psychiatric:         Behavior: Behavior normal            Appointment on 07/15/2022   Component Date Value Ref Range Status    Cholesterol 07/15/2022 243 (A) See Comment mg/dL Final    Cholesterol:         Pediatric <18 Years        Desirable          <170 mg/dL      Borderline High    170-199 mg/dL      High               >=200 mg/dL        Adult >=18 Years            Desirable         <200 mg/dL      Borderline High   200-239 mg/dL      High              >239 mg/dL      Triglycerides 07/15/2022 90  See Comment mg/dL Final    Triglyceride:     0-9Y            <75mg/dL     10Y-17Y         <90 mg/dL       >=18Y     Normal          <150 mg/dL     Borderline High 150-199 mg/dL     High            200-499 mg/dL        Very High       >499 mg/dL    Specimen collection should occur prior to N-Acetylcysteine or Metamizole administration due to the potential for falsely depressed results   HDL, Direct 07/15/2022 68  >=50 mg/dL Final    Specimen collection should occur prior to Metamizole administration due to the potential for falsley depressed results      LDL Calculated 07/15/2022 157 (A) 0 - 100 mg/dL Final    LDL Cholesterol:     Optimal           <100 mg/dl     Near Optimal      100-129 mg/dl     Above Optimal       Borderline High 130-159 mg/dl       High            160-189 mg/dl       Very High       >189 mg/dl         This screening LDL is a calculated result  It does not have the accuracy of the Direct Measured LDL in the monitoring of patients with hyperlipidemia and/or statin therapy  Direct Measure LDL ([id number]) must be ordered separately in these patients   Non-HDL-Chol (CHOL-HDL) 07/15/2022 175  mg/dl Final   Appointment on 12/09/2021   Component Date Value Ref Range Status    Hemoglobin A1C 12/09/2021 5 8 (A) Normal 3 8-5 6%; PreDiabetic 5 7-6 4%; Diabetic >=6 5%; Glycemic control for adults with diabetes <7 0% % Final    EAG 12/09/2021 120  mg/dl Final   Appointment on 12/02/2021   Component Date Value Ref Range Status    Cholesterol 12/02/2021 247 (A) See Comment mg/dL Final    Cholesterol:         Pediatric <18 Years        Desirable          <170 mg/dL      Borderline High    170-199 mg/dL      High               >=200 mg/dL        Adult >=18 Years            Desirable         <200 mg/dL      Borderline High   200-239 mg/dL      High              >239 mg/dL      Triglycerides 12/02/2021 89  See Comment mg/dL Final    Triglyceride:     0-9Y            <75mg/dL     10Y-17Y         <90 mg/dL       >=18Y     Normal          <150 mg/dL     Borderline High 150-199 mg/dL     High            200-499 mg/dL        Very High       >499 mg/dL    Specimen collection should occur prior to N-Acetylcysteine or Metamizole administration due to the potential for falsely depressed results   HDL, Direct 12/02/2021 66  >=50 mg/dL Final    Specimen collection should occur prior to Metamizole administration due to the potential for falsley depressed results      LDL Calculated 12/02/2021 163 (A) 0 - 100 mg/dL Final    LDL Cholesterol:     Optimal           <100 mg/dl     Near Optimal      100-129 mg/dl     Above Optimal       Borderline High 130-159 mg/dl       High            160-189 mg/dl       Very High       >189 mg/dl         This screening LDL is a calculated result  It does not have the accuracy of the Direct Measured LDL in the monitoring of patients with hyperlipidemia and/or statin therapy  Direct Measure LDL ([id number]) must be ordered separately in these patients   Non-HDL-Chol (CHOL-HDL) 12/02/2021 181  mg/dl Final    Sodium 12/02/2021 137  136 - 145 mmol/L Final    Potassium 12/02/2021 4 0  3 5 - 5 3 mmol/L Final    Chloride 12/02/2021 105  100 - 108 mmol/L Final    CO2 12/02/2021 26  21 - 32 mmol/L Final    ANION GAP 12/02/2021 6  4 - 13 mmol/L Final    BUN 12/02/2021 10  5 - 25 mg/dL Final    Creatinine 12/02/2021 0 84  0 60 - 1 30 mg/dL Final    Standardized to IDMS reference method    Glucose, Fasting 12/02/2021 143 (A) 65 - 99 mg/dL Final    Specimen collection should occur prior to Sulfasalazine administration due to the potential for falsely depressed results  Specimen collection should occur prior to Sulfapyridine administration due to the potential for falsely elevated results   Calcium 12/02/2021 9 5  8 3 - 10 1 mg/dL Final    eGFR 12/02/2021 74  ml/min/1 73sq m Final   Appointment on 09/01/2021   Component Date Value Ref Range Status    Cholesterol 09/01/2021 253 (A) 50 - 200 mg/dL Final    Cholesterol:       Desirable         <200 mg/dl       Borderline         200-239 mg/dl       High              >239           Triglycerides 09/01/2021 89  <=150 mg/dL Final    Triglyceride:     Normal          <150 mg/dl     Borderline High 150-199 mg/dl     High            200-499 mg/dl        Very High       >499 mg/dl    Specimen collection should occur prior to N-Acetylcysteine or Metamizole administration due to the potential for falsely depressed results      HDL, Direct 09/01/2021 61  >=40 mg/dL Final    HDL Cholesterol:       Low     <41 mg/dL  Specimen collection should occur prior to Metamizole administration due to the potential for falsley depressed results   LDL Calculated 09/01/2021 174 (A) 0 - 100 mg/dL Final    LDL Cholesterol:     Optimal           <100 mg/dl     Near Optimal      100-129 mg/dl     Above Optimal       Borderline High 130-159 mg/dl       High            160-189 mg/dl       Very High       >189 mg/dl         This screening LDL is a calculated result  It does not have the accuracy of the Direct Measured LDL in the monitoring of patients with hyperlipidemia and/or statin therapy  Direct Measure LDL ([id number]) must be ordered separately in these patients      Non-HDL-Chol (CHOL-HDL) 09/01/2021 192  mg/dl Final

## 2022-07-20 ENCOUNTER — TELEPHONE (OUTPATIENT)
Dept: INTERNAL MEDICINE CLINIC | Age: 67
End: 2022-07-20

## 2022-07-26 LAB — COLOGUARD RESULT REPORTABLE: NEGATIVE

## 2022-08-08 DIAGNOSIS — R06.09 DYSPNEA ON EXERTION: ICD-10-CM

## 2022-08-08 RX ORDER — ALBUTEROL SULFATE 90 UG/1
2 AEROSOL, METERED RESPIRATORY (INHALATION) EVERY 6 HOURS PRN
Qty: 8 G | Refills: 0 | Status: SHIPPED | OUTPATIENT
Start: 2022-08-08

## 2022-12-05 ENCOUNTER — HOSPITAL ENCOUNTER (OUTPATIENT)
Dept: RADIOLOGY | Facility: IMAGING CENTER | Age: 67
Discharge: HOME | End: 2022-12-05

## 2022-12-05 VITALS — HEIGHT: 67 IN | BODY MASS INDEX: 23.23 KG/M2 | WEIGHT: 148 LBS

## 2022-12-05 DIAGNOSIS — Z12.31 ENCOUNTER FOR SCREENING MAMMOGRAM FOR MALIGNANT NEOPLASM OF BREAST: ICD-10-CM

## 2023-01-16 ENCOUNTER — OFFICE VISIT (OUTPATIENT)
Dept: INTERNAL MEDICINE CLINIC | Age: 68
End: 2023-01-16

## 2023-01-16 VITALS
OXYGEN SATURATION: 93 % | TEMPERATURE: 97.4 F | BODY MASS INDEX: 23.7 KG/M2 | HEART RATE: 88 BPM | SYSTOLIC BLOOD PRESSURE: 136 MMHG | HEIGHT: 67 IN | DIASTOLIC BLOOD PRESSURE: 70 MMHG | WEIGHT: 151 LBS

## 2023-01-16 DIAGNOSIS — I10 ESSENTIAL HYPERTENSION: ICD-10-CM

## 2023-01-16 DIAGNOSIS — Z12.12 SCREENING FOR COLORECTAL CANCER: ICD-10-CM

## 2023-01-16 DIAGNOSIS — Z13.6 SCREENING FOR AAA (ABDOMINAL AORTIC ANEURYSM): ICD-10-CM

## 2023-01-16 DIAGNOSIS — Z13.1 SCREENING FOR DIABETES MELLITUS: ICD-10-CM

## 2023-01-16 DIAGNOSIS — J45.20 MILD INTERMITTENT REACTIVE AIRWAY DISEASE WITHOUT COMPLICATION: ICD-10-CM

## 2023-01-16 DIAGNOSIS — Z00.00 WELCOME TO MEDICARE PREVENTIVE VISIT: Primary | ICD-10-CM

## 2023-01-16 DIAGNOSIS — E78.49 OTHER HYPERLIPIDEMIA: ICD-10-CM

## 2023-01-16 DIAGNOSIS — D72.19 OTHER EOSINOPHILIA: ICD-10-CM

## 2023-01-16 DIAGNOSIS — D58.2 ELEVATED HEMOGLOBIN (HCC): ICD-10-CM

## 2023-01-16 DIAGNOSIS — Z12.11 SCREENING FOR COLORECTAL CANCER: ICD-10-CM

## 2023-01-16 DIAGNOSIS — R73.03 PREDIABETES: ICD-10-CM

## 2023-01-16 DIAGNOSIS — Z28.21 23-POLYVALENT PNEUMOCOCCAL POLYSACCHARIDE VACCINE DECLINED: ICD-10-CM

## 2023-01-16 DIAGNOSIS — Z23 ENCOUNTER FOR IMMUNIZATION: ICD-10-CM

## 2023-01-16 DIAGNOSIS — F32.9 REACTIVE DEPRESSION: ICD-10-CM

## 2023-01-16 DIAGNOSIS — Z12.31 ENCOUNTER FOR SCREENING MAMMOGRAM FOR BREAST CANCER: ICD-10-CM

## 2023-01-16 RX ORDER — LOSARTAN POTASSIUM 100 MG/1
100 TABLET ORAL DAILY
Qty: 30 TABLET | Refills: 5 | Status: SHIPPED | OUTPATIENT
Start: 2023-01-16

## 2023-01-16 RX ORDER — DULOXETIN HYDROCHLORIDE 20 MG/1
20 CAPSULE, DELAYED RELEASE ORAL DAILY
Qty: 30 CAPSULE | Refills: 1 | Status: SHIPPED | OUTPATIENT
Start: 2023-01-16

## 2023-01-16 RX ORDER — AMLODIPINE BESYLATE 5 MG/1
5 TABLET ORAL DAILY
Qty: 30 TABLET | Refills: 5 | Status: SHIPPED | OUTPATIENT
Start: 2023-01-16

## 2023-01-16 NOTE — PROGRESS NOTES
Assessment/Plan:    Welcome to Medicare visit  - welcome to Medicare annual wellness visit done   -Cologuard test done on 7/22/2022 was negative  Patient will be due for repeat colon cancer screening in 2025   -  Lung cancer screen is not indicated since patient quit smoking cigarettes over 27 years ago  - DEXA scan is due and has been ordered and patient will get it done  We will follow-up with results   -last mammogram was done last month and was normal   She will be due for repeat mammogram in December this year   -She is up-to-date with 3 COVID vaccines and the Tdap and is not interested in any other vaccines at this time  -EKG was done and reviewed  -follow-up in 6 months      Essential hypertension  -Stable  -Continue with amlodipine and losartan which we will refill today  -Continue with a low-salt diet    Mild intermittent reactive airway disease  -Stable  -Continue with as needed albuterol inhaler  -She was counseled to quit smoking marijuana    Hyperlipidemia  -Stable  -Continue with a Heart healthy diet and with exercise  - We will recheck a lipid panel    Prediabetes  -Stable  - cut back on carbs and simple sugars  -We will recheck a BMP    Depression with anxiety  -Well-controlled on Effexor but this is no longer formulary for her  -We will trial her on Cymbalta and prescribe 20 mg of Cymbalta and she should switch when she runs out of Effexor at current dose of 37 5 mg  -She was counseled to call if her symptoms are not adequately controlled or if she develops any adverse reactions    Eosinophilia with elevated hemoglobin  -Seen on last CBC  -We will order repeat CBC to monitor resolution     Diagnoses and all orders for this visit:    Welcome to Medicare preventive visit  -     POCT ECG    Essential hypertension  -     losartan (Cozaar) 100 MG tablet; Take 1 tablet (100 mg total) by mouth daily  -     amLODIPine (NORVASC) 5 mg tablet;  Take 1 tablet (5 mg total) by mouth daily  -     Basic metabolic panel; Future    Mild intermittent reactive airway disease without complication    Other hyperlipidemia    Prediabetes  -     Hemoglobin A1C; Future  -     Basic metabolic panel; Future    Reactive depression  -     DULoxetine (CYMBALTA) 20 mg capsule; Take 1 capsule (20 mg total) by mouth daily    Screening for diabetes mellitus  -     Glucose, fasting; Future  -     Basic metabolic panel; Future    Encounter for screening mammogram for breast cancer  Comments:  last mammogram was last month and was normal     Screening for colorectal cancer  Comments:  had a cologuard test done 7/22/2022 and it was negative and she states that they made her pay for it     Screening for AAA (abdominal aortic aneurysm)  Comments:  no family hx of AAA    Encounter for immunization  Comments:  she is up to date with the covid shot x 3, and the tdap in 2020 and does not want to take the flu shot, shingles vaccine, pneumonia shot     23-polyvalent pneumococcal polysaccharide vaccine declined    Elevated hemoglobin (HCC)  -     CBC and differential; Future    Other eosinophilia  -     CBC and differential; Future          Subjective:      Patient ID: Jn Padilla is a 74 y o  female  HPI  Pt presents for a welcome to medicare wellness visit as well as a follow-up visit regarding her essential hypertension, reactive airway disease, reactive depression, hyperlipidemia  She states that she did a cologuard test and got a bill and she contested the bill and they need the CPT code  Had never had a colonoscopy   She denies  any fam ekta of colon ca, chronic constipation, melena stools, rectal bleeding or change in stool caliber  Smoked for about 30 years and quit 27 years ago   She has never had a dexa scan but it has been ordered and is scheduled  No family hx of AAA  She takes her medications as prescribed  She would like a refill of some of her medications    She states that she does not smoke cigarettes any longer but she still smokes marijuana to the equivalent of 2 joints a day  She states that it makes her mellow and that she has been smoking for about 30 years  She believes that it did take the place of cigarettes when she quit smoking cigarettes  She states that her depression is fairly well controlled  on Effexor but that her insurance, Medicare, does not pay for it and she has to pay $30 a month with good Rx and $90 without good Rx  She wants to see if we can switch her to a medication that would be covered by her insurance  She states that she has only ever tried Effexor  She started taking it when her  was sick  The following portions of the patient's history were reviewed and updated as appropriate:   She  has a past medical history of Asthma, Depression, History of transfusion, Hypertension, and PONV (postoperative nausea and vomiting)  She   Patient Active Problem List    Diagnosis Date Noted   • Reactive airway disease without complication 25/48/7001   • Hyperglycemia 12/09/2021   • Influenza vaccination declined by patient 12/09/2021   • Nail abnormalities 09/09/2021   • Reactive depression 09/09/2021   • Medication side effect, initial encounter 09/09/2021   • Dyspnea on exertion 06/02/2021   • Hyponatremia 06/02/2021   • Other hyperlipidemia 05/05/2021   • Prediabetes 05/05/2021   • Marijuana use 05/05/2021   • Aftercare following surgery 10/08/2020   • Smoking 08/31/2020   • Essential hypertension 08/27/2020   • Welcome to Medicare preventive visit 08/27/2020   • Closed fracture of olecranon process of right ulna 08/27/2020   • Pain and swelling of upper extremity, right 08/26/2020   • Bruising of multiple areas 08/26/2020   • Right elbow pain 08/26/2020   • Breast cancer screening 08/26/2020   • Colon cancer screening 08/26/2020     She  has a past surgical history that includes Bunionectomy; Tubal ligation; ELBOW FRACTURE REPAIR (Right, 08/31/2020); and Fracture surgery    Her family history includes Hypertension in her mother; Kidney cancer in her father; Lung cancer (age of onset: 52) in her father; Melanoma (age of onset: 25) in her sister; No Known Problems in her brother, maternal aunt, maternal aunt, maternal aunt, maternal aunt, maternal aunt, maternal aunt, maternal aunt, maternal aunt, maternal grandfather, maternal grandmother, paternal aunt, paternal aunt, paternal grandfather, and paternal grandmother; Parkinsonism in her mother  She  reports that she has quit smoking  Her smoking use included cigarettes  She has a 30 00 pack-year smoking history  She has never used smokeless tobacco  She reports current alcohol use of about 14 0 standard drinks per week  She reports current drug use  Frequency: 7 00 times per week  Drug: Marijuana  Current Outpatient Medications   Medication Sig Dispense Refill   • albuterol (PROVENTIL HFA,VENTOLIN HFA) 90 mcg/act inhaler Inhale 2 puffs every 6 (six) hours as needed for wheezing or shortness of breath 8 g 0   • amLODIPine (NORVASC) 5 mg tablet Take 1 tablet (5 mg total) by mouth daily 30 tablet 5   • clobetasol (TEMOVATE) 0 05 % cream as needed     • DULoxetine (CYMBALTA) 20 mg capsule Take 1 capsule (20 mg total) by mouth daily 30 capsule 1   • losartan (Cozaar) 100 MG tablet Take 1 tablet (100 mg total) by mouth daily 30 tablet 5     No current facility-administered medications for this visit       Current Outpatient Medications on File Prior to Visit   Medication Sig   • albuterol (PROVENTIL HFA,VENTOLIN HFA) 90 mcg/act inhaler Inhale 2 puffs every 6 (six) hours as needed for wheezing or shortness of breath   • clobetasol (TEMOVATE) 0 05 % cream as needed   • [DISCONTINUED] amLODIPine (NORVASC) 5 mg tablet Take 1 tablet (5 mg total) by mouth daily   • [DISCONTINUED] losartan (Cozaar) 100 MG tablet Take 1 tablet (100 mg total) by mouth daily   • [DISCONTINUED] venlafaxine (EFFEXOR-XR) 37 5 mg 24 hr capsule Take 1 capsule (37 5 mg total) by mouth daily with breakfast     No current facility-administered medications on file prior to visit  She has No Known Allergies       Review of Systems   Constitutional: Positive for diaphoresis  Negative for activity change, chills, fatigue, fever and unexpected weight change  HENT: Positive for rhinorrhea (occasionally )  Negative for ear pain, postnasal drip, sinus pressure and sore throat  Eyes: Negative for pain  Respiratory: Positive for cough (productive of whitish grey phlegm - smokes marijuana every day and has been doing that for up to 30 years )  Negative for choking, chest tightness, shortness of breath and wheezing  Cardiovascular: Negative for chest pain, palpitations and leg swelling  Gastrointestinal: Negative for abdominal pain, constipation, diarrhea, nausea and vomiting  Genitourinary: Negative for dysuria and hematuria  Musculoskeletal: Negative for arthralgias, back pain, gait problem, joint swelling, myalgias and neck stiffness  Skin: Negative for pallor and rash  Neurological: Negative for dizziness, tremors, seizures, syncope, light-headedness and headaches  Hematological: Negative for adenopathy  Psychiatric/Behavioral: Negative for behavioral problems  The patient is nervous/anxious (makes comfortably numb)  Objective:      /70 (BP Location: Left arm, Patient Position: Sitting, Cuff Size: Standard)   Pulse 88   Temp (!) 97 4 °F (36 3 °C) (Temporal)   Ht 5' 6 5" (1 689 m)   Wt 68 5 kg (151 lb)   SpO2 93%   BMI 24 01 kg/m²          Physical Exam  Constitutional:       General: She is not in acute distress  Appearance: She is well-developed  She is not diaphoretic  HENT:      Head: Normocephalic and atraumatic  Right Ear: External ear normal       Left Ear: External ear normal       Nose: Congestion present  Mouth/Throat:      Mouth: Mucous membranes are dry  Pharynx: Posterior oropharyngeal erythema present  No oropharyngeal exudate  Eyes:      General: No scleral icterus  Right eye: No discharge  Left eye: No discharge  Conjunctiva/sclera: Conjunctivae normal       Pupils: Pupils are equal, round, and reactive to light  Neck:      Thyroid: No thyromegaly  Vascular: No JVD  Trachea: No tracheal deviation  Cardiovascular:      Rate and Rhythm: Normal rate and regular rhythm  Heart sounds: Heart sounds are distant  No murmur heard  No friction rub  No gallop  Pulmonary:      Effort: Pulmonary effort is normal  No respiratory distress  Breath sounds: Decreased air movement present  Decreased breath sounds (decreased BS  in all lung fields) present  No wheezing or rales  Chest:      Chest wall: No tenderness  Abdominal:      General: Bowel sounds are normal  There is no distension  Palpations: Abdomen is soft  There is no mass  Tenderness: There is no abdominal tenderness  There is no guarding or rebound  Musculoskeletal:         General: No tenderness or deformity  Normal range of motion  Cervical back: Normal range of motion and neck supple  Lymphadenopathy:      Cervical: No cervical adenopathy  Skin:     General: Skin is warm and dry  Coloration: Skin is not pale  Findings: No erythema or rash  Neurological:      Mental Status: She is alert and oriented to person, place, and time  Cranial Nerves: No cranial nerve deficit  Motor: No abnormal muscle tone  Coordination: Coordination normal       Deep Tendon Reflexes: Reflexes are normal and symmetric     Psychiatric:         Behavior: Behavior normal            Appointment on 07/15/2022   Component Date Value Ref Range Status   • Cholesterol 07/15/2022 243 (H)  See Comment mg/dL Final    Cholesterol:         Pediatric <18 Years        Desirable          <170 mg/dL      Borderline High    170-199 mg/dL      High               >=200 mg/dL        Adult >=18 Years            Desirable         <200 mg/dL      Borderline High   200-239 mg/dL      High              >239 mg/dL     • Triglycerides 07/15/2022 90  See Comment mg/dL Final    Triglyceride:     0-9Y            <75mg/dL     10Y-17Y         <90 mg/dL       >=18Y     Normal          <150 mg/dL     Borderline High 150-199 mg/dL     High            200-499 mg/dL        Very High       >499 mg/dL    Specimen collection should occur prior to N-Acetylcysteine or Metamizole administration due to the potential for falsely depressed results  • HDL, Direct 07/15/2022 68  >=50 mg/dL Final    Specimen collection should occur prior to Metamizole administration due to the potential for falsley depressed results  • LDL Calculated 07/15/2022 157 (H)  0 - 100 mg/dL Final    LDL Cholesterol:     Optimal           <100 mg/dl     Near Optimal      100-129 mg/dl     Above Optimal       Borderline High 130-159 mg/dl       High            160-189 mg/dl       Very High       >189 mg/dl         This screening LDL is a calculated result  It does not have the accuracy of the Direct Measured LDL in the monitoring of patients with hyperlipidemia and/or statin therapy  Direct Measure LDL (GLB011) must be ordered separately in these patients     • Non-HDL-Chol (CHOL-HDL) 07/15/2022 175  mg/dl Final       Answers for HPI/ROS submitted by the patient on 1/13/2023  How often during the last year have you found that you were not able to stop drinking once you had started?: 0 - never  How often during the last year have you failed to do what was normally expected from you because of drinking?: 0 - never  How often during the last year have you needed a first drink in the morning to get yourself going after a heavy drinking session?: 0 - never  How often during the last year have you had a feeling of guilt or remorse after drinking?: 0 - never  How often during the last year have you been unable to remember what happened the night before because you had been drinking?: 0 - never  Have you or someone else been injured as a result of your drinking?: 0 - no  Has a relative or friend or a doctor or another health worker been concerned about your drinking or suggested you cut down?: 0 - no  How would you rate your overall health?: good  Compared to last year, how is your physical health?: same  In general, how satisfied are you with your life?: satisfied  Compared to last year, how is your eyesight?: same  Compared to last year, how is your hearing?: same  Compared to last year, how is your emotional/mental health?: same  How often is anger a problem for you?: never, rarely  How often do you feel unusually tired/fatigued?: never, rarely  In the past 7 days, how much pain have you experienced?: none  In the past 6 months, have you lost or gained 10 pounds without trying?: No  One or more falls in the last year: No  In the past 6 months, have you accidentally leaked urine?: Yes  Do you have trouble with the stairs inside or outside your home?: No  Does your home have working smoke alarms?: Yes  Does your home have a carbon monoxide monitor?: Yes  Which safety hazards (if any) have you experienced in your home? Please select all that apply : none  How would you describe your current diet?  Please select all that apply : Regular, Unhealthy  In addition to prescription medications, are you taking any over-the-counter supplements?: Yes  If yes, what supplements are you taking?: Centrum multi vitamin for women  Can you manage your medications?: Yes  Are you currently taking any opioid medications?: No  Can you walk and transfer into and out of your bed and chair?: Yes  Can you dress and groom yourself?: Yes  Can you bathe or shower yourself?: Yes  Can you feed yourself?: Yes  Can you do your laundry/ housekeeping?: Yes  Can you manage your money, pay your bills, and track your expenses?: Yes  Can you make your own meals?: Yes  Can you do your own shopping?: Yes  Within the last 12 months, have you had any hospitalizations or Emergency Department visits?: No  Do you have a living will?: No  Do you have a Durable POA (Power of ) for healthcare decisions?: No  Do you have an Advanced Directive for end of life decisions?: No  How often have you used an illegal drug (including marijuana) or a prescription medication for non-medical reasons in the past year?: daily or almost daily  Have you used drugs other than those required for medical reasons?: No  Do you abuse more than one drug at a time?: No  Are you always able to stop using drugs when you want to?: Yes  Have you had "blackouts" or "flashbacks" as a result of drug use?: No  Do you ever feel bad or guilty about your drug use?: No  Does your spouse (or parents) ever complain about your involvement with drugs?: No  Have you neglected your family because of your use of drugs?: No  Have you engaged in illegal activities in order to obtain drugs?: No  Have you ever experienced withdrawal symptoms (felt sick) when you stopped taking drugs?: No  Have you had medical problems as a result of your drug use (e g , memory loss, hepatitis, convulsions, bleeding, etc )?: No  What is the typical number of drinks you consume in a day?: 2  What is the typical number of drinks you consume in a week?: 14  How often did you have a drink containing alcohol in the past year?: 4 or more times a week  How many drinks did you have on a typical day  when you were drinking in the past year?: 1 to 2  How often did you have 6 or more drinks on one occasion in the past year?: never

## 2023-01-16 NOTE — PROGRESS NOTES
Assessment and Plan:     Problem List Items Addressed This Visit        Respiratory    Reactive airway disease without complication       Cardiovascular and Mediastinum    Essential hypertension    Relevant Medications    losartan (Cozaar) 100 MG tablet    amLODIPine (NORVASC) 5 mg tablet    Other Relevant Orders    Basic metabolic panel       Other    Welcome to Medicare preventive visit - Primary    Relevant Orders    POCT ECG (Completed)    Other hyperlipidemia    Prediabetes    Relevant Orders    Hemoglobin E8L    Basic metabolic panel    Reactive depression    Relevant Medications    DULoxetine (CYMBALTA) 20 mg capsule   Other Visit Diagnoses     Screening for diabetes mellitus        Relevant Orders    Glucose, fasting    Basic metabolic panel    Encounter for screening mammogram for breast cancer        last mammogram was last month and was normal     Screening for colorectal cancer        had a cologuard test done 7/22/2022 and it was negative and she states that they made her pay for it     Screening for AAA (abdominal aortic aneurysm)        no family hx of AAA    Encounter for immunization        she is up to date with the covid shot x 3, and the tdap in 2020 and does not want to take the flu shot, shingles vaccine, pneumonia shot     23-polyvalent pneumococcal polysaccharide vaccine declined        Elevated hemoglobin (HCC)        Relevant Orders    CBC and differential    Other eosinophilia        Relevant Orders    CBC and differential          Urinary Incontinence Plan of Care: counseling topics discussed: practice Kegel (pelvic floor strengthening) exercises, limit alcohol, caffeine, spicy foods, and acidic foods, limiting fluid intake 3-4 hours before bed and preventing constipation  Preventive health issues were discussed with patient, and age appropriate screening tests were ordered as noted in patient's After Visit Summary    Personalized health advice and appropriate referrals for health education or preventive services given if needed, as noted in patient's After Visit Summary       History of Present Illness:     Patient presents for a Medicare Wellness Visit    HPI   Patient Care Team:  Jero Gonsalez DO as PCP - General (Internal Medicine)     Review of Systems:     Review of Systems     Problem List:     Patient Active Problem List   Diagnosis   • Pain and swelling of upper extremity, right   • Bruising of multiple areas   • Right elbow pain   • Breast cancer screening   • Colon cancer screening   • Essential hypertension   • Welcome to Medicare preventive visit   • Closed fracture of olecranon process of right ulna   • Smoking   • Aftercare following surgery   • Other hyperlipidemia   • Prediabetes   • Marijuana use   • Dyspnea on exertion   • Hyponatremia   • Nail abnormalities   • Reactive depression   • Medication side effect, initial encounter   • Hyperglycemia   • Influenza vaccination declined by patient   • Reactive airway disease without complication      Past Medical and Surgical History:     Past Medical History:   Diagnosis Date   • Asthma    • Depression    • History of transfusion    • Hypertension    • PONV (postoperative nausea and vomiting)      Past Surgical History:   Procedure Laterality Date   • BUNIONECTOMY     • ELBOW FRACTURE REPAIR Right 08/31/2020    Procedure: OPEN REDUCTION W/ INTERNAL FIXATION (ORIF) ELBOW;  Surgeon: Eugenia Mcintyre MD;  Location:  MAIN OR;  Service: Orthopedics   • FRACTURE SURGERY     • TUBAL LIGATION        Family History:     Family History   Problem Relation Age of Onset   • Parkinsonism Mother    • Hypertension Mother    • Lung cancer Father 52   • Kidney cancer Father    • Melanoma Sister 18        upper right thigh   • No Known Problems Maternal Grandmother    • No Known Problems Maternal Grandfather    • No Known Problems Paternal Grandmother    • No Known Problems Paternal Grandfather    • No Known Problems Brother    • No Known Problems Maternal Aunt    • No Known Problems Maternal Aunt    • No Known Problems Maternal Aunt    • No Known Problems Maternal Aunt    • No Known Problems Maternal Aunt    • No Known Problems Maternal Aunt    • No Known Problems Maternal Aunt    • No Known Problems Maternal Aunt    • No Known Problems Paternal Aunt    • No Known Problems Paternal Aunt       Social History:     Social History     Socioeconomic History   • Marital status:      Spouse name: None   • Number of children: None   • Years of education: None   • Highest education level: None   Occupational History   • None   Tobacco Use   • Smoking status: Former     Packs/day: 1 50     Years: 20 00     Pack years: 30 00     Types: Cigarettes   • Smokeless tobacco: Never   • Tobacco comments:     Quit 1995    Vaping Use   • Vaping Use: Never used   Substance and Sexual Activity   • Alcohol use: Yes     Alcohol/week: 14 0 standard drinks     Types: 14 Standard drinks or equivalent per week     Comment: daily    • Drug use: Yes     Frequency: 7 0 times per week     Types: Marijuana   • Sexual activity: Not Currently     Birth control/protection: None   Other Topics Concern   • None   Social History Narrative   • None     Social Determinants of Health     Financial Resource Strain: Low Risk    • Difficulty of Paying Living Expenses: Not hard at all   Food Insecurity: Not on file   Transportation Needs: No Transportation Needs   • Lack of Transportation (Medical): No   • Lack of Transportation (Non-Medical):  No   Physical Activity: Not on file   Stress: Not on file   Social Connections: Not on file   Intimate Partner Violence: Not on file   Housing Stability: Not on file      Medications and Allergies:     Current Outpatient Medications   Medication Sig Dispense Refill   • albuterol (PROVENTIL HFA,VENTOLIN HFA) 90 mcg/act inhaler Inhale 2 puffs every 6 (six) hours as needed for wheezing or shortness of breath 8 g 0   • amLODIPine (NORVASC) 5 mg tablet Take 1 tablet (5 mg total) by mouth daily 30 tablet 5   • clobetasol (TEMOVATE) 0 05 % cream as needed     • DULoxetine (CYMBALTA) 20 mg capsule Take 1 capsule (20 mg total) by mouth daily 30 capsule 1   • losartan (Cozaar) 100 MG tablet Take 1 tablet (100 mg total) by mouth daily 30 tablet 5     No current facility-administered medications for this visit  No Known Allergies   Immunizations:     Immunization History   Administered Date(s) Administered   • COVID-19 PFIZER VACCINE 0 3 ML IM 06/17/2021, 07/14/2021, 01/17/2022   • Hep A, ped/adol, 2 dose 06/22/2006, 05/19/2010   • Hep B, adult 06/08/2006, 07/11/2006, 05/19/2010   • INFLUENZA 10/13/2010, 11/21/2012   • Pneumococcal Polysaccharide PPV23 05/06/2011   • Td (adult), Unspecified 06/08/2006   • Tdap 08/26/2020      Health Maintenance:         Topic Date Due   • HIV Screening  Never done   • Breast Cancer Screening: Mammogram  12/05/2023   • Colorectal Cancer Screening  07/20/2025   • Hepatitis C Screening  Completed         Topic Date Due   • COVID-19 Vaccine (4 - Booster for Pfizer series) 03/14/2022      Medicare Screening Tests and Risk Assessments:     Román Pringle is here for her Welcome to Medicare visit  Health Risk Assessment:   Patient rates overall health as good  Patient feels that their physical health rating is same  Patient is satisfied with their life  Eyesight was rated as same  Hearing was rated as same  Patient feels that their emotional and mental health rating is same  Patients states they are never, rarely angry  Patient states they are never, rarely unusually tired/fatigued  Pain experienced in the last 7 days has been none  Patient states that she has experienced no weight loss or gain in last 6 months  Depression Screening:   PHQ-9 Score: 4      Fall Risk Screening:    In the past year, patient has experienced: no history of falling in past year      Urinary Incontinence Screening:   Patient has leaked urine accidently in the last six months  Home Safety:  Patient does not have trouble with stairs inside or outside of their home  Patient has working smoke alarms and has working carbon monoxide detector  Home safety hazards include: none  Nutrition:   Current diet is Regular and Unhealthy  Medications:   Patient is currently taking over-the-counter supplements  OTC medications include: Centrum multi vitamin for women  Patient is able to manage medications  Activities of Daily Living (ADLs)/Instrumental Activities of Daily Living (IADLs):   Walk and transfer into and out of bed and chair?: Yes  Dress and groom yourself?: Yes    Bathe or shower yourself?: Yes    Feed yourself? Yes  Do your laundry/housekeeping?: Yes  Manage your money, pay your bills and track your expenses?: Yes  Make your own meals?: Yes    Do your own shopping?: Yes    Previous Hospitalizations:   Any hospitalizations or ED visits within the last 12 months?: No      Advance Care Planning:   Living will: No    Durable POA for healthcare: No    Advanced directive: No      PREVENTIVE SCREENINGS      Cardiovascular Screening:    General: Screening Not Indicated and History Lipid Disorder      Colorectal Cancer Screening:     General: Screening Current      Breast Cancer Screening:     General: Screening Current      Cervical Cancer Screening:    General: Screening Not Indicated      Hepatitis C Screening:    General: Screening Current    Screening, Brief Intervention, and Referral to Treatment (SBIRT)    Screening  Typical number of drinks in a day: 2  Typical number of drinks in a week: 14  Interpretation: Low risk drinking behavior  AUDIT-C Screenin) How often did you have a drink containing alcohol in the past year? 4 or more times a week  2) How many drinks did you have on a typical day when you were drinking in the past year?  1 to 2  3) How often did you have 6 or more drinks on one occasion in the past year? never    AUDIT-C Score: 4  Interpretation: Score 3-12 (female): POSITIVE screen for alcohol misuse    AUDIT Screenin) How often during the last year have you found that you were not able to stop drinking once you had started? 0 - never  5) How often during the last year have you failed to do what was normally expected from you because of drinking? 0 - never  6) How often during the last year have you needed a first drink in the morning to get yourself going after a heavy drinking session? 0 - never  7) How often during the last year have you had a feeling of guilt or remorse after drinking? 0 - never  8) How often during the last year have you been unable to remember what happened the night before because you had been drinking? 0 - never  9) Have you or someone else been injured as a result of your drinking? 0 - no  10) Has a relative or friend or a doctor or another health worker been concerned about your drinking or suggested you cut down? 0 - no    AUDIT Score: 4  Interpretation: Low risk alcohol consumption    Single Item Drug Screening:  How often have you used an illegal drug (including marijuana) or a prescription medication for non-medical reasons in the past year? daily or almost daily    Single Item Drug Screen Score: 4  Interpretation: POSITIVE screen for possible drug use disorder    Drug Abuse Screening Test (DAST-10):  1) Have you used drugs other than those required for medical reasons? No  2) Do you abuse more than one drug at a time? No  3) Are you always able to stop using drugs when you want to? Yes  4) Have you had "blackouts" or "flashbacks" as a result of drug use? No  5) Do you ever feel bad or guilty about your drug use? No  6) Does your spouse (or parents) ever complain about your involvement with drugs? No  7) Have you neglected your family because of your use of drugs? No  8) Have you engaged in illegal activities in order to obtain drugs?  No  9) Have you ever experienced withdrawal symptoms (felt sick) when you stopped taking drugs? No  10) Have you had medical problems as a result of your drug use (e g , memory loss, hepatitis, convulsions, bleeding, etc )?  No    DAST-10 Score: 0  Interpretation: No problems reported    Vision Screening    Right eye Left eye Both eyes   Without correction 20/50 20/25 20/20   With correction           Physical Exam:     /70 (BP Location: Left arm, Patient Position: Sitting, Cuff Size: Standard)   Pulse 88   Temp (!) 97 4 °F (36 3 °C) (Temporal)   Ht 5' 6 5" (1 689 m)   Wt 68 5 kg (151 lb)   SpO2 93%   BMI 24 01 kg/m²     Physical Exam     Maura Mcintosh DO

## 2023-01-16 NOTE — PATIENT INSTRUCTIONS
Medicare Preventive Visit Patient Instructions  Thank you for completing your Welcome to Medicare Visit or Medicare Annual Wellness Visit today  Your next wellness visit will be due in one year (1/17/2024)  The screening/preventive services that you may require over the next 5-10 years are detailed below  Some tests may not apply to you based off risk factors and/or age  Screening tests ordered at today's visit but not completed yet may show as past due  Also, please note that scanned in results may not display below  Preventive Screenings:  Service Recommendations Previous Testing/Comments   Colorectal Cancer Screening  * Colonoscopy    * Fecal Occult Blood Test (FOBT)/Fecal Immunochemical Test (FIT)  * Fecal DNA/Cologuard Test  * Flexible Sigmoidoscopy Age: 39-70 years old   Colonoscopy: every 10 years (may be performed more frequently if at higher risk)  OR  FOBT/FIT: every 1 year  OR  Cologuard: every 3 years  OR  Sigmoidoscopy: every 5 years  Screening may be recommended earlier than age 39 if at higher risk for colorectal cancer  Also, an individualized decision between you and your healthcare provider will decide whether screening between the ages of 74-80 would be appropriate  Colonoscopy: 07/20/2022  FOBT/FIT: Not on file  Cologuard: 07/20/2022  Sigmoidoscopy: Not on file    Screening Current     Breast Cancer Screening Age: 36 years old  Frequency: every 1-2 years  Not required if history of left and right mastectomy Mammogram: 12/05/2022    Screening Current   Cervical Cancer Screening Between the ages of 21-29, pap smear recommended once every 3 years  Between the ages of 33-67, can perform pap smear with HPV co-testing every 5 years     Recommendations may differ for women with a history of total hysterectomy, cervical cancer, or abnormal pap smears in past  Pap Smear: Not on file    Screening Not Indicated   Hepatitis C Screening Once for adults born between 1945 and 1965  More frequently in patients at high risk for Hepatitis C Hep C Antibody: 05/19/2021    Screening Current   Diabetes Screening 1-2 times per year if you're at risk for diabetes or have pre-diabetes Fasting glucose: 143 mg/dL (12/2/2021)  A1C: 5 8 % (12/9/2021)      Cholesterol Screening Once every 5 years if you don't have a lipid disorder  May order more often based on risk factors  Lipid panel: 07/15/2022    Screening Not Indicated  History Lipid Disorder     Other Preventive Screenings Covered by Medicare:  1  Abdominal Aortic Aneurysm (AAA) Screening: covered once if your at risk  You're considered to be at risk if you have a family history of AAA  2  Lung Cancer Screening: covers low dose CT scan once per year if you meet all of the following conditions: (1) Age 50-69; (2) No signs or symptoms of lung cancer; (3) Current smoker or have quit smoking within the last 15 years; (4) You have a tobacco smoking history of at least 20 pack years (packs per day multiplied by number of years you smoked); (5) You get a written order from a healthcare provider  3  Glaucoma Screening: covered annually if you're considered high risk: (1) You have diabetes OR (2) Family history of glaucoma OR (3)  aged 48 and older OR (3)  American aged 72 and older  3  Osteoporosis Screening: covered every 2 years if you meet one of the following conditions: (1) You're estrogen deficient and at risk for osteoporosis based off medical history and other findings; (2) Have a vertebral abnormality; (3) On glucocorticoid therapy for more than 3 months; (4) Have primary hyperparathyroidism; (5) On osteoporosis medications and need to assess response to drug therapy  · Last bone density test (DXA Scan): Not on file  5  HIV Screening: covered annually if you're between the age of 12-76  Also covered annually if you are younger than 13 and older than 72 with risk factors for HIV infection   For pregnant patients, it is covered up to 3 times per pregnancy  Immunizations:  Immunization Recommendations   Influenza Vaccine Annual influenza vaccination during flu season is recommended for all persons aged >= 6 months who do not have contraindications   Pneumococcal Vaccine   * Pneumococcal conjugate vaccine = PCV13 (Prevnar 13), PCV15 (Vaxneuvance), PCV20 (Prevnar 20)  * Pneumococcal polysaccharide vaccine = PPSV23 (Pneumovax) Adults 25-60 years old: 1-3 doses may be recommended based on certain risk factors  Adults 72 years old: 1-2 doses may be recommended based off what pneumonia vaccine you previously received   Hepatitis B Vaccine 3 dose series if at intermediate or high risk (ex: diabetes, end stage renal disease, liver disease)   Tetanus (Td) Vaccine - COST NOT COVERED BY MEDICARE PART B Following completion of primary series, a booster dose should be given every 10 years to maintain immunity against tetanus  Td may also be given as tetanus wound prophylaxis  Tdap Vaccine - COST NOT COVERED BY MEDICARE PART B Recommended at least once for all adults  For pregnant patients, recommended with each pregnancy  Shingles Vaccine (Shingrix) - COST NOT COVERED BY MEDICARE PART B  2 shot series recommended in those aged 48 and above     Health Maintenance Due:      Topic Date Due   • HIV Screening  Never done   • Breast Cancer Screening: Mammogram  12/05/2023   • Colorectal Cancer Screening  07/20/2025   • Hepatitis C Screening  Completed     Immunizations Due:      Topic Date Due   • Pneumococcal Vaccine: 65+ Years (2 - PCV) 05/06/2012   • COVID-19 Vaccine (4 - Booster for Ibarra Peter series) 03/14/2022     Advance Directives   What are advance directives? Advance directives are legal documents that state your wishes and plans for medical care  These plans are made ahead of time in case you lose your ability to make decisions for yourself   Advance directives can apply to any medical decision, such as the treatments you want, and if you want to donate organs  What are the types of advance directives? There are many types of advance directives, and each state has rules about how to use them  You may choose a combination of any of the following:  · Living will: This is a written record of the treatment you want  You can also choose which treatments you do not want, which to limit, and which to stop at a certain time  This includes surgery, medicine, IV fluid, and tube feedings  · Durable power of  for healthcare [facility]): This is a written record that states who you want to make healthcare choices for you when you are unable to make them for yourself  This person, called a proxy, is usually a family member or a friend  You may choose more than 1 proxy  · Do not resuscitate (DNR) order:  A DNR order is used in case your heart stops beating or you stop breathing  It is a request not to have certain forms of treatment, such as CPR  A DNR order may be included in other types of advance directives  · Medical directive: This covers the care that you want if you are in a coma, near death, or unable to make decisions for yourself  You can list the treatments you want for each condition  Treatment may include pain medicine, surgery, blood transfusions, dialysis, IV or tube feedings, and a ventilator (breathing machine)  · Values history: This document has questions about your views, beliefs, and how you feel and think about life  This information can help others choose the care that you would choose  Why are advance directives important? An advance directive helps you control your care  Although spoken wishes may be used, it is better to have your wishes written down  Spoken wishes can be misunderstood, or not followed  Treatments may be given even if you do not want them  An advance directive may make it easier for your family to make difficult choices about your care     Urinary Incontinence   Urinary incontinence (UI)  is when you lose control of your bladder  UI develops because your bladder cannot store or empty urine properly  The 3 most common types of UI are stress incontinence, urge incontinence, or both  Medicines:   · May be given to help strengthen your bladder control  Report any side effects of medication to your healthcare provider  Do pelvic muscle exercises often:  Your pelvic muscles help you stop urinating  Squeeze these muscles tight for 5 seconds, then relax for 5 seconds  Gradually work up to squeezing for 10 seconds  Do 3 sets of 15 repetitions a day, or as directed  This will help strengthen your pelvic muscles and improve bladder control  Train your bladder:  Go to the bathroom at set times, such as every 2 hours, even if you do not feel the urge to go  You can also try to hold your urine when you feel the urge to go  For example, hold your urine for 5 minutes when you feel the urge to go  As that becomes easier, hold your urine for 10 minutes  Self-care:   · Keep a UI record  Write down how often you leak urine and how much you leak  Make a note of what you were doing when you leaked urine  · Drink liquids as directed  You may need to limit the amount of liquid you drink to help control your urine leakage  Do not drink any liquid right before you go to bed  Limit or do not have drinks that contain caffeine or alcohol  · Prevent constipation  Eat a variety of high-fiber foods  Good examples are high-fiber cereals, beans, vegetables, and whole-grain breads  Walking is the best way to trigger your intestines to have a bowel movement  · Exercise regularly and maintain a healthy weight  Weight loss and exercise will decrease pressure on your bladder and help you control your leakage  · Use a catheter as directed  to help empty your bladder  A catheter is a tiny, plastic tube that is put into your bladder to drain your urine  · Go to behavior therapy as directed    Behavior therapy may be used to help you learn to control your urge to urinate  Alcohol Use and Your Health    Drinking too much can harm your health  Excessive alcohol use leads to about 88,000 death in the [place] each year, and shortens the life of those who diet by almost 30 years  Further, excessive drinking cost the economy $249 billion in 2010  Most excessive drinkers are not alcohol dependent  Excessive alcohol use has immediate effects that increase the risk of many harmful health conditions  These are most often the result of binge drinking  Over time, excessive alcohol use can lead to the development of chronic diseases and other series health problems  What is considered a "drink"? Excessive alcohol use includes:  · Binge Drinking: For women, 4 or more drinks consumed on one occasion  For men, 5 or more drinks consumed on one occasion  · Heavy Drinking: For women, 8 or more drinks per week  For men, 15 or more drinks per week  · Any alcohol used by pregnant women  · Any alcohol used by those under the age of 21 years    If you choose to drink, do so in moderation:  · Do not drink at all if you are under the age of 24, or if you are or may be pregnant, or have health problems that could be made worse by drinking    · For women, up to 1 drink per day  · For men, up to 2 drinks a day    No one should begin drinking or drink more frequently based on potential health benefits    Short-Term Health Risks:  · Injuries: motor vehicle crashes, falls, drownings, burns  · Violence: homicide, suicide, sexual assault, intimate partner violence  · Alcohol poisoning  · Reproductive health: risky sexual behaviors, unintended prengnacy, sexually transmitted diseases, miscarriage, stillbirth, fetal alcohol syndrome    Long-Term Health Risks:  · Chronic diseases: high blood pressure, heart disease, stroke, liver disease, digestive problems  · Cancers: breast, mouth and throat, liver, colon  · Learning and memory problems: dementia, poor school performance  · Mental health: depression, anxiety, insomnia  · Social problems: lost productivity, family problems, unemployment  · Alcohol dependence    For support and more information:  · Substance Abuse and [address] , [address] [place]  Web Address: https://ZEB/    · Alcoholics Anonymous        Web Address: http://www darby info/    https://www cdc gov/alcohol/fact-sheets/alcohol-use htm     © [address] 2018 Information is for End User's use only and may not be sold, redistributed or otherwise used for commercial purposes  All illustrations and images included in CareNotes® are the copyrighted property of [employer]  or [facility] Preventive Visit Patient Instructions  Thank you for completing your Welcome to Medicare Visit or Medicare Annual Wellness Visit today  Your next wellness visit will be due in one year (1/17/2024)  The screening/preventive services that you may require over the next 5-10 years are detailed below  Some tests may not apply to you based off risk factors and/or age  Screening tests ordered at today's visit but not completed yet may show as past due  Also, please note that scanned in results may not display below  Preventive Screenings:  Service Recommendations Previous Testing/Comments   Colorectal Cancer Screening  * Colonoscopy    * Fecal Occult Blood Test (FOBT)/Fecal Immunochemical Test (FIT)  * Fecal DNA/Cologuard Test  * Flexible Sigmoidoscopy Age: 39-70 years old   Colonoscopy: every 10 years (may be performed more frequently if at higher risk)  OR  FOBT/FIT: every 1 year  OR  Cologuard: every 3 years  OR  Sigmoidoscopy: every 5 years  Screening may be recommended earlier than age 39 if at higher risk for colorectal cancer  Also, an individualized decision between you and your healthcare provider will decide whether screening between the ages of 74-80 would be appropriate   Colonoscopy: 07/20/2022  FOBT/FIT: Not on file  Cologuard: 07/20/2022  Sigmoidoscopy: Not on file    Screening Current     Breast Cancer Screening Age: 36 years old  Frequency: every 1-2 years  Not required if history of left and right mastectomy Mammogram: 12/05/2022    Screening Current   Cervical Cancer Screening Between the ages of 21-29, pap smear recommended once every 3 years  Between the ages of 33-67, can perform pap smear with HPV co-testing every 5 years  Recommendations may differ for women with a history of total hysterectomy, cervical cancer, or abnormal pap smears in past  Pap Smear: Not on file    Screening Not Indicated   Hepatitis C Screening Once for adults born between 1945 and 1965  More frequently in patients at high risk for Hepatitis C Hep C Antibody: 05/19/2021    Screening Current   Diabetes Screening 1-2 times per year if you're at risk for diabetes or have pre-diabetes Fasting glucose: 143 mg/dL (12/2/2021)  A1C: 5 8 % (12/9/2021)      Cholesterol Screening Once every 5 years if you don't have a lipid disorder  May order more often based on risk factors  Lipid panel: 07/15/2022    Screening Not Indicated  History Lipid Disorder     Other Preventive Screenings Covered by Medicare:  6  Abdominal Aortic Aneurysm (AAA) Screening: covered once if your at risk  You're considered to be at risk if you have a family history of AAA  7  Lung Cancer Screening: covers low dose CT scan once per year if you meet all of the following conditions: (1) Age 50-69; (2) No signs or symptoms of lung cancer; (3) Current smoker or have quit smoking within the last 15 years; (4) You have a tobacco smoking history of at least 20 pack years (packs per day multiplied by number of years you smoked); (5) You get a written order from a healthcare provider    8  Glaucoma Screening: covered annually if you're considered high risk: (1) You have diabetes OR (2) Family history of glaucoma OR (3)  aged 48 and older OR (4)  American aged 72 and older  5  Osteoporosis Screening: covered every 2 years if you meet one of the following conditions: (1) You're estrogen deficient and at risk for osteoporosis based off medical history and other findings; (2) Have a vertebral abnormality; (3) On glucocorticoid therapy for more than 3 months; (4) Have primary hyperparathyroidism; (5) On osteoporosis medications and need to assess response to drug therapy  · Last bone density test (DXA Scan): Not on file  10  HIV Screening: covered annually if you're between the age of 12-76  Also covered annually if you are younger than 13 and older than 72 with risk factors for HIV infection  For pregnant patients, it is covered up to 3 times per pregnancy  Immunizations:  Immunization Recommendations   Influenza Vaccine Annual influenza vaccination during flu season is recommended for all persons aged >= 6 months who do not have contraindications   Pneumococcal Vaccine   * Pneumococcal conjugate vaccine = PCV13 (Prevnar 13), PCV15 (Vaxneuvance), PCV20 (Prevnar 20)  * Pneumococcal polysaccharide vaccine = PPSV23 (Pneumovax) Adults 25-60 years old: 1-3 doses may be recommended based on certain risk factors  Adults 72 years old: 1-2 doses may be recommended based off what pneumonia vaccine you previously received   Hepatitis B Vaccine 3 dose series if at intermediate or high risk (ex: diabetes, end stage renal disease, liver disease)   Tetanus (Td) Vaccine - COST NOT COVERED BY MEDICARE PART B Following completion of primary series, a booster dose should be given every 10 years to maintain immunity against tetanus  Td may also be given as tetanus wound prophylaxis  Tdap Vaccine - COST NOT COVERED BY MEDICARE PART B Recommended at least once for all adults  For pregnant patients, recommended with each pregnancy     Shingles Vaccine (Shingrix) - COST NOT COVERED BY MEDICARE PART B  2 shot series recommended in those aged 48 and above     Health Maintenance Due:      Topic Date Due   • HIV Screening  Never done   • Breast Cancer Screening: Mammogram  12/05/2023   • Colorectal Cancer Screening  07/20/2025   • Hepatitis C Screening  Completed     Immunizations Due:      Topic Date Due   • Pneumococcal Vaccine: 65+ Years (2 - PCV) 05/06/2012   • COVID-19 Vaccine (4 - Booster for Fred Issa series) 03/14/2022     Advance Directives   What are advance directives? Advance directives are legal documents that state your wishes and plans for medical care  These plans are made ahead of time in case you lose your ability to make decisions for yourself  Advance directives can apply to any medical decision, such as the treatments you want, and if you want to donate organs  What are the types of advance directives? There are many types of advance directives, and each state has rules about how to use them  You may choose a combination of any of the following:  · Living will: This is a written record of the treatment you want  You can also choose which treatments you do not want, which to limit, and which to stop at a certain time  This includes surgery, medicine, IV fluid, and tube feedings  · Durable power of  for healthcare [facility]): This is a written record that states who you want to make healthcare choices for you when you are unable to make them for yourself  This person, called a proxy, is usually a family member or a friend  You may choose more than 1 proxy  · Do not resuscitate (DNR) order:  A DNR order is used in case your heart stops beating or you stop breathing  It is a request not to have certain forms of treatment, such as CPR  A DNR order may be included in other types of advance directives  · Medical directive: This covers the care that you want if you are in a coma, near death, or unable to make decisions for yourself  You can list the treatments you want for each condition   Treatment may include pain medicine, surgery, blood transfusions, dialysis, IV or tube feedings, and a ventilator (breathing machine)  · Values history: This document has questions about your views, beliefs, and how you feel and think about life  This information can help others choose the care that you would choose  Why are advance directives important? An advance directive helps you control your care  Although spoken wishes may be used, it is better to have your wishes written down  Spoken wishes can be misunderstood, or not followed  Treatments may be given even if you do not want them  An advance directive may make it easier for your family to make difficult choices about your care  Urinary Incontinence   Urinary incontinence (UI)  is when you lose control of your bladder  UI develops because your bladder cannot store or empty urine properly  The 3 most common types of UI are stress incontinence, urge incontinence, or both  Medicines:   · May be given to help strengthen your bladder control  Report any side effects of medication to your healthcare provider  Do pelvic muscle exercises often:  Your pelvic muscles help you stop urinating  Squeeze these muscles tight for 5 seconds, then relax for 5 seconds  Gradually work up to squeezing for 10 seconds  Do 3 sets of 15 repetitions a day, or as directed  This will help strengthen your pelvic muscles and improve bladder control  Train your bladder:  Go to the bathroom at set times, such as every 2 hours, even if you do not feel the urge to go  You can also try to hold your urine when you feel the urge to go  For example, hold your urine for 5 minutes when you feel the urge to go  As that becomes easier, hold your urine for 10 minutes  Self-care:   · Keep a UI record  Write down how often you leak urine and how much you leak  Make a note of what you were doing when you leaked urine  · Drink liquids as directed  You may need to limit the amount of liquid you drink to help control your urine leakage   Do not drink any liquid right before you go to bed  Limit or do not have drinks that contain caffeine or alcohol  · Prevent constipation  Eat a variety of high-fiber foods  Good examples are high-fiber cereals, beans, vegetables, and whole-grain breads  Walking is the best way to trigger your intestines to have a bowel movement  · Exercise regularly and maintain a healthy weight  Weight loss and exercise will decrease pressure on your bladder and help you control your leakage  · Use a catheter as directed  to help empty your bladder  A catheter is a tiny, plastic tube that is put into your bladder to drain your urine  · Go to behavior therapy as directed  Behavior therapy may be used to help you learn to control your urge to urinate  Alcohol Use and Your Health    Drinking too much can harm your health  Excessive alcohol use leads to about 88,000 death in the [place] each year, and shortens the life of those who diet by almost 30 years  Further, excessive drinking cost the economy $249 billion in 2010  Most excessive drinkers are not alcohol dependent  Excessive alcohol use has immediate effects that increase the risk of many harmful health conditions  These are most often the result of binge drinking  Over time, excessive alcohol use can lead to the development of chronic diseases and other series health problems  What is considered a "drink"? Excessive alcohol use includes:  · Binge Drinking: For women, 4 or more drinks consumed on one occasion  For men, 5 or more drinks consumed on one occasion  · Heavy Drinking: For women, 8 or more drinks per week  For men, 15 or more drinks per week  · Any alcohol used by pregnant women  · Any alcohol used by those under the age of 21 years    If you choose to drink, do so in moderation:  · Do not drink at all if you are under the age of 24, or if you are or may be pregnant, or have health problems that could be made worse by drinking    · For women, up to 1 drink per day  · For men, up to 2 drinks a day    No one should begin drinking or drink more frequently based on potential health benefits    Short-Term Health Risks:  · Injuries: motor vehicle crashes, falls, drownings, burns  · Violence: homicide, suicide, sexual assault, intimate partner violence  · Alcohol poisoning  · Reproductive health: risky sexual behaviors, unintended prengnacy, sexually transmitted diseases, miscarriage, stillbirth, fetal alcohol syndrome    Long-Term Health Risks:  · Chronic diseases: high blood pressure, heart disease, stroke, liver disease, digestive problems  · Cancers: breast, mouth and throat, liver, colon  · Learning and memory problems: dementia, poor school performance  · Mental health: depression, anxiety, insomnia  · Social problems: lost productivity, family problems, unemployment  · Alcohol dependence    For support and more information:  · Substance Abuse and [address] , [address] [place]  Web Address: https://Jumpzter/    · Alcoholics Anonymous        Web Address: http://AskYou/    https://www cdc gov/alcohol/fact-sheets/alcohol-use htm     © Copyright [employer] 2018 Information is for End User's use only and may not be sold, redistributed or otherwise used for commercial purposes   All illustrations and images included in CareNotes® are the copyrighted property of A D A M , Inc  or [address] [place] [address]

## 2023-01-30 ENCOUNTER — TELEPHONE (OUTPATIENT)
Dept: INTERNAL MEDICINE CLINIC | Age: 68
End: 2023-01-30

## 2023-01-30 NOTE — TELEPHONE ENCOUNTER
Patient stopped by the office today with the bill she received from [employer] for her Cologuard she did on 07/20/2022  Patient received the bill but also has the attached information from Rosalva Cardenas that states what was missing from [employer] when they sent the information to [employer]  Patient did pay this so she didn't go to collection  I did state to the patient we will look into this and find out the issue  She may get a refund on this if they turn around and pay this    Will send this information to Ayan Goncalves, and Annabelle    Please contact the patient when someone has an update for the patient

## 2023-01-30 NOTE — TELEPHONE ENCOUNTER
Called and spoke to [place] at [employer]   Pt can call and speak to billing at [facility] to get situation rectified  They are available until 10 PM central time during the week  She does have two options  Either she can submit an itemized statement through her insurance, which [employer] can provide her the form, or [employer] pull the payment, put is aside, submit the claim, wait for processing and payment, then reimburse pt but this would take time possibly up to 75 days  Pt need to call [employer] and speak to anyone is billing department  Provided [employer] with Shady Douglas rule information but [employer] still wants a call from her either way  Called and notified pt  She will call [facility] and ask for billling   [facility]

## 2023-03-17 PROBLEM — Z00.00 WELCOME TO MEDICARE PREVENTIVE VISIT: Status: RESOLVED | Noted: 2020-08-27 | Resolved: 2023-03-17

## 2023-03-28 DIAGNOSIS — F32.9 REACTIVE DEPRESSION: ICD-10-CM

## 2023-03-28 RX ORDER — DULOXETIN HYDROCHLORIDE 20 MG/1
20 CAPSULE, DELAYED RELEASE ORAL DAILY
Qty: 30 CAPSULE | Refills: 5 | Status: SHIPPED | OUTPATIENT
Start: 2023-03-28

## 2023-07-17 ENCOUNTER — OFFICE VISIT (OUTPATIENT)
Dept: INTERNAL MEDICINE CLINIC | Age: 68
End: 2023-07-17
Payer: MEDICARE

## 2023-07-17 VITALS
HEART RATE: 72 BPM | TEMPERATURE: 98 F | WEIGHT: 144 LBS | DIASTOLIC BLOOD PRESSURE: 92 MMHG | BODY MASS INDEX: 22.6 KG/M2 | HEIGHT: 67 IN | SYSTOLIC BLOOD PRESSURE: 136 MMHG | OXYGEN SATURATION: 99 %

## 2023-07-17 DIAGNOSIS — F12.90 MARIJUANA USE: ICD-10-CM

## 2023-07-17 DIAGNOSIS — E87.1 HYPONATREMIA: ICD-10-CM

## 2023-07-17 DIAGNOSIS — J45.20 MILD INTERMITTENT REACTIVE AIRWAY DISEASE WITHOUT COMPLICATION: ICD-10-CM

## 2023-07-17 DIAGNOSIS — R05.3 CHRONIC COUGH: ICD-10-CM

## 2023-07-17 DIAGNOSIS — R73.03 PREDIABETES: ICD-10-CM

## 2023-07-17 DIAGNOSIS — E78.49 OTHER HYPERLIPIDEMIA: ICD-10-CM

## 2023-07-17 DIAGNOSIS — I10 ESSENTIAL HYPERTENSION: Primary | ICD-10-CM

## 2023-07-17 DIAGNOSIS — F32.9 REACTIVE DEPRESSION: ICD-10-CM

## 2023-07-17 DIAGNOSIS — R06.09 DYSPNEA ON EXERTION: ICD-10-CM

## 2023-07-17 PROBLEM — R73.9 HYPERGLYCEMIA: Status: RESOLVED | Noted: 2021-12-09 | Resolved: 2023-07-17

## 2023-07-17 PROCEDURE — 99215 OFFICE O/P EST HI 40 MIN: CPT | Performed by: INTERNAL MEDICINE

## 2023-07-17 RX ORDER — GUAIFENESIN 600 MG/1
600 TABLET, EXTENDED RELEASE ORAL EVERY 12 HOURS SCHEDULED
Qty: 20 TABLET | Refills: 0 | Status: SHIPPED | OUTPATIENT
Start: 2023-07-17

## 2023-07-17 RX ORDER — DULOXETIN HYDROCHLORIDE 20 MG/1
20 CAPSULE, DELAYED RELEASE ORAL DAILY
Qty: 90 CAPSULE | Refills: 1 | Status: CANCELLED | OUTPATIENT
Start: 2023-07-17

## 2023-07-17 RX ORDER — VENLAFAXINE HYDROCHLORIDE 75 MG/1
75 CAPSULE, EXTENDED RELEASE ORAL
Qty: 90 CAPSULE | Refills: 1 | Status: SHIPPED | OUTPATIENT
Start: 2023-07-17

## 2023-07-17 RX ORDER — LOSARTAN POTASSIUM 100 MG/1
100 TABLET ORAL DAILY
Qty: 90 TABLET | Refills: 1 | Status: SHIPPED | OUTPATIENT
Start: 2023-07-17

## 2023-07-17 RX ORDER — AMLODIPINE BESYLATE 5 MG/1
5 TABLET ORAL DAILY
Qty: 90 TABLET | Refills: 1 | Status: SHIPPED | OUTPATIENT
Start: 2023-07-17

## 2023-07-17 NOTE — PATIENT INSTRUCTIONS
PLEASE CHECK YOUR BLOOD PRESSURE TWICE A DAY, IN THE MORNING AND IN THE EVENING AND KEEP A BLOOD PRESSURE LOG. GOAL BLOOD PRESSURE /80 AND BELOW. PLEASE CALL THE OFFICE IF BLOOD PRESSURE IS CONSISTENTLY ABOVE 130/80.

## 2023-07-17 NOTE — PROGRESS NOTES
Assessment/Plan:    Essential hypertension  -Not well controlled likely secondary to increased stressors and the fact that she did not take her medication this morning  -We will refill her amlodipine and losartan  -She was counseled to monitor her blood pressure at home, in the morning and in the evening and keep a blood pressure log and to call the office if blood pressure is consistently above 130/80  -we will order a TSH and reprint the order for her BMP  -Continue with a low-salt diet and with exercise    Mild intermittent reactive airway disease  -Stable without acute exacerbation  -Patient was encouraged to use her albuterol inhaler with wheezing, shortness of breath and cough  -We will also prescribe Mucinex    Reactive depression  -Not optimally controlled on Cymbalta especially with current life stressors  -She is willing to go back on venlafaxine and to pay the difference that her insurance will not cover. She states that she found a discount deal that will help her and that she can comfortably afford the difference. We will restart her on venlafaxine at the mildly increased dose of 75 mg, up from 37.5 mg. Hyperlipidemia  -Stable  -We will order a repeat lipid panel  -Continue with a heart healthy diet and exercise as much as possible. Hyponatremia  -BMP was ordered to recheck sodium level but patient has not yet done it  -We will reprint the prescription for her BMP and follow-up with the result    Prediabetes  -Stable  -Continue with diet low in carbs and simple sugars  -We will recheck a BMP    Chronic cough  -Patient was coughing quite actively throughout the visit but denies any other symptoms  -We will start her on Mucinex and she was encouraged to use her albuterol inhaler especially as she is mildly wheezing as this may be a cough variant asthma versus reactive airway disease.     Marijuana use  -Likely contributing to worsening cough  -She will benefit from quitting but states that she uses it  to keep herself "comfortably numb" with all  that is going on in her life. Diagnoses and all orders for this visit:    Essential hypertension  -     amLODIPine (NORVASC) 5 mg tablet; Take 1 tablet (5 mg total) by mouth daily No child proof caps  -     losartan (Cozaar) 100 MG tablet; Take 1 tablet (100 mg total) by mouth daily  -     Lipid panel; Future  -     TSH, 3rd generation with Free T4 reflex; Future    Mild intermittent reactive airway disease without complication  -     guaiFENesin (MUCINEX) 600 mg 12 hr tablet; Take 1 tablet (600 mg total) by mouth every 12 (twelve) hours  -     Lipid panel; Future  -     TSH, 3rd generation with Free T4 reflex; Future    Reactive depression  -     venlafaxine (EFFEXOR-XR) 75 mg 24 hr capsule; Take 1 capsule (75 mg total) by mouth daily with breakfast  -     Lipid panel; Future  -     TSH, 3rd generation with Free T4 reflex; Future    Prediabetes  -     Lipid panel; Future  -     TSH, 3rd generation with Free T4 reflex; Future    Other hyperlipidemia  -     Lipid panel; Future  -     TSH, 3rd generation with Free T4 reflex; Future    Dyspnea on exertion  -     Lipid panel; Future  -     TSH, 3rd generation with Free T4 reflex; Future    Hyponatremia  -     Lipid panel; Future  -     TSH, 3rd generation with Free T4 reflex; Future    Chronic cough  -     guaiFENesin (MUCINEX) 600 mg 12 hr tablet; Take 1 tablet (600 mg total) by mouth every 12 (twelve) hours  -     Lipid panel; Future  -     TSH, 3rd generation with Free T4 reflex; Future    Marijuana use             Subjective:      Patient ID: Enrique Sequeira is a 74 y.o. female. HPI    Patient presents for a follow-up visit regarding her essential hypertension, reactive airway disease, reactive depression, hyperlipidemia, chronic cough. She states that she has been taking her medications as prescribed.   Today pt states that she normally takes her bp meds by 10 am but did not take them this morning because she had not eaten and was not sure if she would require blood work today. Of note, blood work was ordered at her last visit but she has not yet done it. She states that she is watching the salt in her diet. She does not really check her bp at home. She does not smoke cigarettes, she quit in , about 28 years ago. Only smokes recreational marijuana cos it makes her "comfortably numb" especially with all that is going on in her life at this time. She states that she has been more depressed recently and feels like her Cymbalta is not working as well as the effexor used to. Her Cymbalta has been switched from Effexor because her insurance did not cover it and she states that Effexor worked very well for her at that time. In addition, she states that she has been getting some bruises with very mild trauma. She also complains that her life has changed quite a bit. 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 Of note, he lives in the house by himself and does not pay his bills. She has to take care of all of that. She also complains that she is having issues with her house and her new RV. Her R elbow is doing well   She  would like to switch back to Effexor if possible. She states that she still smokes marijuana all day and has been having cough all day long but is productive of yellow phlegm. She also admits to occasional wheezing, and diaphoresis but denies any worsening of the cough and denies any runny nose, stuffy nose, sore throat, sinus pain or pressure, fever, chills, headache, dizziness, chest pain, shortness of breath, palpitations, nausea, vomiting, abdominal pain, diarrhea, constipation, myalgias, arthralgias.     The following portions of the patient's history were reviewed and updated as appropriate:   She  has a past medical history of Asthma, Depression, History of transfusion, Hypertension, and PONV (postoperative nausea and vomiting). She   Patient Active Problem List    Diagnosis Date Noted   • Chronic cough 07/17/2023   • Reactive airway disease without complication 30/49/9195   • Influenza vaccination declined by patient 12/09/2021   • Nail abnormalities 09/09/2021   • Reactive depression 09/09/2021   • Medication side effect, initial encounter 09/09/2021   • Dyspnea on exertion 06/02/2021   • Hyponatremia 06/02/2021   • Other hyperlipidemia 05/05/2021   • Prediabetes 05/05/2021   • Marijuana use 05/05/2021   • Aftercare following surgery 10/08/2020   • Smoking 08/31/2020   • Essential hypertension 08/27/2020   • Closed fracture of olecranon process of right ulna 08/27/2020   • Pain and swelling of upper extremity, right 08/26/2020   • Bruising of multiple areas 08/26/2020   • Right elbow pain 08/26/2020   • Breast cancer screening 08/26/2020   • Colon cancer screening 08/26/2020     She  has a past surgical history that includes Bunionectomy; Tubal ligation; ELBOW FRACTURE REPAIR (Right, 08/31/2020); and Fracture surgery. Her family history includes Hypertension in her mother; Kidney cancer in her father; Lung cancer (age of onset: 52) in her father; Melanoma (age of onset: 25) in her sister; No Known Problems in her brother, maternal aunt, maternal aunt, maternal aunt, maternal aunt, maternal aunt, maternal aunt, maternal aunt, maternal aunt, maternal grandfather, maternal grandmother, paternal aunt, paternal aunt, paternal grandfather, and paternal grandmother; Parkinsonism in her mother. She  reports that she has quit smoking. Her smoking use included cigarettes. She has a 30.00 pack-year smoking history. She has never used smokeless tobacco. She reports current alcohol use of about 14.0 standard drinks of alcohol per week. She reports current drug use.  Frequency: 7.00 times per week. Drug: Marijuana. Current Outpatient Medications   Medication Sig Dispense Refill   • albuterol (PROVENTIL HFA,VENTOLIN HFA) 90 mcg/act inhaler Inhale 2 puffs every 6 (six) hours as needed for wheezing or shortness of breath 8 g 0   • amLODIPine (NORVASC) 5 mg tablet Take 1 tablet (5 mg total) by mouth daily No child proof caps 90 tablet 1   • clobetasol (TEMOVATE) 0.05 % cream as needed     • guaiFENesin (MUCINEX) 600 mg 12 hr tablet Take 1 tablet (600 mg total) by mouth every 12 (twelve) hours 20 tablet 0   • losartan (Cozaar) 100 MG tablet Take 1 tablet (100 mg total) by mouth daily 90 tablet 1   • venlafaxine (EFFEXOR-XR) 75 mg 24 hr capsule Take 1 capsule (75 mg total) by mouth daily with breakfast 90 capsule 1     No current facility-administered medications for this visit. Current Outpatient Medications on File Prior to Visit   Medication Sig   • albuterol (PROVENTIL HFA,VENTOLIN HFA) 90 mcg/act inhaler Inhale 2 puffs every 6 (six) hours as needed for wheezing or shortness of breath   • clobetasol (TEMOVATE) 0.05 % cream as needed   • [DISCONTINUED] amLODIPine (NORVASC) 5 mg tablet Take 1 tablet (5 mg total) by mouth daily   • [DISCONTINUED] DULoxetine (CYMBALTA) 20 mg capsule Take 1 capsule (20 mg total) by mouth daily   • [DISCONTINUED] losartan (Cozaar) 100 MG tablet Take 1 tablet (100 mg total) by mouth daily     No current facility-administered medications on file prior to visit. She has No Known Allergies. .    Review of Systems   Constitutional: Positive for diaphoresis (occasionally ). Negative for activity change, chills, fatigue, fever and unexpected weight change. HENT: Negative for ear pain, postnasal drip, rhinorrhea, sinus pressure and sore throat. Eyes: Negative for pain. Respiratory: Positive for cough (productive of yellow phlegm - chronic and unchanged ) and wheezing. Negative for choking, chest tightness and shortness of breath.     Cardiovascular: Negative for chest pain, palpitations and leg swelling. Gastrointestinal: Negative for abdominal pain, constipation, diarrhea, nausea and vomiting. Genitourinary: Negative for dysuria and hematuria. Musculoskeletal: Negative for arthralgias, back pain, gait problem, joint swelling, myalgias and neck stiffness. Skin: Positive for color change (Patchy ecchymosis with mild trauma). Negative for pallor and rash. Neurological: Negative for dizziness, tremors, seizures, syncope, light-headedness and headaches. Hematological: Negative for adenopathy. Psychiatric/Behavioral: Positive for dysphoric mood. Negative for behavioral problems. Objective:      /92 (BP Location: Left arm, Patient Position: Sitting, Cuff Size: Adult)   Pulse 72   Temp 98 °F (36.7 °C) (Temporal)   Ht 5' 6.5" (1.689 m)   Wt 65.3 kg (144 lb)   SpO2 99%   BMI 22.89 kg/m²          Physical Exam  Constitutional:       General: She is not in acute distress. Appearance: She is well-developed. She is not diaphoretic. HENT:      Head: Normocephalic and atraumatic. Right Ear: External ear normal. Tympanic membrane is injected (mild). Left Ear: External ear normal.      Nose: Nose normal.      Mouth/Throat:      Mouth: Mucous membranes are dry. Pharynx: Posterior oropharyngeal erythema (Oropharyngeal erythema with dry mucous membranes) present. No oropharyngeal exudate. Eyes:      General: No scleral icterus. Right eye: No discharge. Left eye: No discharge. Conjunctiva/sclera: Conjunctivae normal.      Pupils: Pupils are equal, round, and reactive to light. Neck:      Thyroid: No thyromegaly. Vascular: No JVD. Trachea: No tracheal deviation. Cardiovascular:      Rate and Rhythm: Normal rate and regular rhythm. Heart sounds: Normal heart sounds. No murmur heard. No friction rub. No gallop. Pulmonary:      Effort: Pulmonary effort is normal. No respiratory distress. Breath sounds: Examination of the right-lower field reveals wheezing. Examination of the left-lower field reveals wheezing. Wheezing (Scattered wheezes in the bilateral lower lung lobes) present. No rales. Chest:      Chest wall: No tenderness. Abdominal:      General: Bowel sounds are normal. There is no distension. Palpations: Abdomen is soft. There is no mass. Tenderness: There is no abdominal tenderness. There is no guarding or rebound. Musculoskeletal:         General: No tenderness or deformity. Normal range of motion. Cervical back: Normal range of motion and neck supple. Lymphadenopathy:      Cervical: No cervical adenopathy. Skin:     General: Skin is warm and dry. Coloration: Skin is not pale. Findings: Ecchymosis (Small areas of healing ecchymosis on the extremities) present. No erythema or rash. Neurological:      Mental Status: She is alert and oriented to person, place, and time. Cranial Nerves: No cranial nerve deficit. Motor: No abnormal muscle tone. Coordination: Coordination normal.      Deep Tendon Reflexes: Reflexes are normal and symmetric. Psychiatric:         Behavior: Behavior normal.           No visits with results within 12 Month(s) from this visit.    Latest known visit with results is:   Appointment on 07/15/2022   Component Date Value Ref Range Status   • Cholesterol 07/15/2022 243 (H)  See Comment mg/dL Final    Cholesterol:         Pediatric <18 Years        Desirable          <170 mg/dL      Borderline High    170-199 mg/dL      High               >=200 mg/dL        Adult >=18 Years            Desirable         <200 mg/dL      Borderline High   200-239 mg/dL      High              >239 mg/dL     • Triglycerides 07/15/2022 90  See Comment mg/dL Final    Triglyceride:     0-9Y            <75mg/dL     10Y-17Y         <90 mg/dL       >=18Y     Normal          <150 mg/dL     Borderline High 150-199 mg/dL     High            200-499 mg/dL        Very High       >499 mg/dL    Specimen collection should occur prior to N-Acetylcysteine or Metamizole administration due to the potential for falsely depressed results. • HDL, Direct 07/15/2022 68  >=50 mg/dL Final    Specimen collection should occur prior to Metamizole administration due to the potential for falsley depressed results. • LDL Calculated 07/15/2022 157 (H)  0 - 100 mg/dL Final    LDL Cholesterol:     Optimal           <100 mg/dl     Near Optimal      100-129 mg/dl     Above Optimal       Borderline High 130-159 mg/dl       High            160-189 mg/dl       Very High       >189 mg/dl         This screening LDL is a calculated result. It does not have the accuracy of the Direct Measured LDL in the monitoring of patients with hyperlipidemia and/or statin therapy. Direct Measure LDL (HDS404) must be ordered separately in these patients.    • Non-HDL-Chol (CHOL-HDL) 07/15/2022 175  mg/dl Final

## 2023-08-11 DIAGNOSIS — R06.09 DYSPNEA ON EXERTION: ICD-10-CM

## 2023-08-11 RX ORDER — ALBUTEROL SULFATE 90 UG/1
2 AEROSOL, METERED RESPIRATORY (INHALATION) EVERY 6 HOURS PRN
Qty: 8 G | Refills: 5 | Status: SHIPPED | OUTPATIENT
Start: 2023-08-11

## 2023-08-18 ENCOUNTER — TELEPHONE (OUTPATIENT)
Dept: INTERNAL MEDICINE CLINIC | Age: 68
End: 2023-08-18

## 2023-08-18 NOTE — TELEPHONE ENCOUNTER
Patient called and states still having cough with mucous, worse at night. Patient states unable to sleep and SOB. Patient is asking if she should be on another medication.     Patient uses [employer] [address]    Patient can be reached at [phone]    Thank you

## 2023-08-21 ENCOUNTER — OFFICE VISIT (OUTPATIENT)
Dept: INTERNAL MEDICINE CLINIC | Age: 68
End: 2023-08-21
Payer: MEDICARE

## 2023-08-21 VITALS
HEART RATE: 115 BPM | TEMPERATURE: 98.7 F | HEIGHT: 67 IN | WEIGHT: 136 LBS | DIASTOLIC BLOOD PRESSURE: 82 MMHG | BODY MASS INDEX: 21.35 KG/M2 | SYSTOLIC BLOOD PRESSURE: 140 MMHG | OXYGEN SATURATION: 90 %

## 2023-08-21 DIAGNOSIS — J45.21 MILD INTERMITTENT ASTHMA WITH ACUTE EXACERBATION: Primary | ICD-10-CM

## 2023-08-21 DIAGNOSIS — I49.8 OTHER CARDIAC ARRHYTHMIA: ICD-10-CM

## 2023-08-21 DIAGNOSIS — J45.20 MILD INTERMITTENT REACTIVE AIRWAY DISEASE WITHOUT COMPLICATION: ICD-10-CM

## 2023-08-21 DIAGNOSIS — J06.9 URTI (ACUTE UPPER RESPIRATORY INFECTION): ICD-10-CM

## 2023-08-21 DIAGNOSIS — R00.0 TACHYCARDIA: ICD-10-CM

## 2023-08-21 DIAGNOSIS — Z12.31 ENCOUNTER FOR SCREENING MAMMOGRAM FOR MALIGNANT NEOPLASM OF BREAST: ICD-10-CM

## 2023-08-21 PROBLEM — I49.9 ARRHYTHMIA: Status: ACTIVE | Noted: 2023-08-21

## 2023-08-21 PROCEDURE — 93000 ELECTROCARDIOGRAM COMPLETE: CPT | Performed by: INTERNAL MEDICINE

## 2023-08-21 PROCEDURE — 99214 OFFICE O/P EST MOD 30 MIN: CPT | Performed by: INTERNAL MEDICINE

## 2023-08-21 RX ORDER — AMOXICILLIN AND CLAVULANATE POTASSIUM 875; 125 MG/1; MG/1
1 TABLET, FILM COATED ORAL EVERY 12 HOURS SCHEDULED
Qty: 14 TABLET | Refills: 0 | Status: SHIPPED | OUTPATIENT
Start: 2023-08-21 | End: 2023-08-28 | Stop reason: ALTCHOICE

## 2023-08-21 RX ORDER — PREDNISONE 20 MG/1
40 TABLET ORAL DAILY
Qty: 10 TABLET | Refills: 0 | Status: SHIPPED | OUTPATIENT
Start: 2023-08-21 | End: 2023-08-26

## 2023-08-21 NOTE — PROGRESS NOTES
Assessment/Plan:    Mild intermittent asthma with acute exacerbation  -We will start patient on prednisone 40 mg daily for 5 days  -She was counseled to continue with albuterol inhaler as needed  -She was counseled to quit smoking marijuana.  -Follow-up in 1 week    Cardiac arrhythmia with tachycardia  -Point-of-care EKG showed an irregular rhythm with tachycardia with premature ventricular contractions, normal axis, normal intervals and no ischemic changes. Of note, this was different from previous EKG on 8/31/2020.  -We will order a CBC, CMP, TSH and magnesium level and follow-up with the results.  -We will refer patient to cardiology    Upper respiratory tract infection  - likely viral with bacterial superinfection vs bacterial  - we will start pt on Augmentin 875-125 mg twice daily for 7 days, Flonase nasal spray for rhinitis and Mucinex for productive cough  - Pt was counseled to use OTC tylenol or ibuprofen with meals for aches and pains, warm salt water gargles for sore throat and was encouraged to drink lots of fluids, get plenty of rest and wash her hands liberally. - pt was also counseled to take antibiotics with food and also to use a probiotic like yogurt daily as long as she is taking antibiotics  - She should return to the clinic if her symptoms worsen or do not improve. Diagnoses and all orders for this visit:    Mild intermittent asthma with acute exacerbation  -     predniSONE 20 mg tablet; Take 2 tablets (40 mg total) by mouth daily for 5 days  -     Comprehensive metabolic panel; Future  -     Magnesium; Future  -     CBC and differential; Future  -     TSH, 3rd generation with Free T4 reflex; Future    Encounter for screening mammogram for malignant neoplasm of breast  -     Mammo screening bilateral w 3d & cad; Future  -     Comprehensive metabolic panel; Future    Other cardiac arrhythmia  -     POCT ECG  -     Ambulatory Referral to Cardiology;  Future  -     Comprehensive metabolic panel; Future  -     Magnesium; Future  -     CBC and differential; Future  -     TSH, 3rd generation with Free T4 reflex; Future    Tachycardia  -     POCT ECG  -     Ambulatory Referral to Cardiology; Future  -     Comprehensive metabolic panel; Future  -     Magnesium; Future  -     CBC and differential; Future  -     TSH, 3rd generation with Free T4 reflex; Future    Mild intermittent reactive airway disease without complication  -     Comprehensive metabolic panel; Future  -     Magnesium; Future  -     CBC and differential; Future  -     TSH, 3rd generation with Free T4 reflex; Future    URTI (acute upper respiratory infection)  -     amoxicillin-clavulanate (AUGMENTIN) 875-125 mg per tablet; Take 1 tablet by mouth every 12 (twelve) hours for 7 days  -     Comprehensive metabolic panel; Future  -     Magnesium; Future  -     CBC and differential; Future  -     TSH, 3rd generation with Free T4 reflex; Future             Subjective:      Patient ID: Michael Ryan is a 74 y.o. female. HPI  Patient presents with complaints that she has been feeling sick for 2 weeks with worsening cough and sob and wheezing. She states that she occasionally has a cough but the cough has worsened. Cough is productive of yellow and bubbly phlegm and she admits to associated diaphoresis especially after coughing, with palpitations also when coughing. Of note, she states that she has stopped smoking marijuana since her symptoms worsened but prior to that was smoking 2 joints a day. She smokes cigarettes for about 25 years and quit smoking 23 years ago. She also complains that whenever she overeats, she develops shortness of breath. She has been using her rescue inhaler 4 times a day. She denies fever, chills, headache, dizziness, nasal congestion, rhinorrhea, sinus pain or pressure, sore throat, chest pain, nausea, vomiting, abdominal pain, diarrhea, constipation, myalgias, arthralgias.   No hx of contact       The following portions of the patient's history were reviewed and updated as appropriate:   She  has a past medical history of Asthma, Depression, History of transfusion, Hypertension, and PONV (postoperative nausea and vomiting). She   Patient Active Problem List    Diagnosis Date Noted   • Arrhythmia 08/21/2023   • Tachycardia 08/21/2023   • Mild intermittent asthma with acute exacerbation 08/21/2023   • URTI (acute upper respiratory infection) 08/21/2023   • Chronic cough 07/17/2023   • Reactive airway disease without complication 18/04/3443   • Influenza vaccination declined by patient 12/09/2021   • Nail abnormalities 09/09/2021   • Reactive depression 09/09/2021   • Medication side effect, initial encounter 09/09/2021   • Dyspnea on exertion 06/02/2021   • Hyponatremia 06/02/2021   • Other hyperlipidemia 05/05/2021   • Prediabetes 05/05/2021   • Marijuana use 05/05/2021   • Aftercare following surgery 10/08/2020   • Smoking 08/31/2020   • Essential hypertension 08/27/2020   • Closed fracture of olecranon process of right ulna 08/27/2020   • Pain and swelling of upper extremity, right 08/26/2020   • Bruising of multiple areas 08/26/2020   • Right elbow pain 08/26/2020   • Breast cancer screening 08/26/2020   • Colon cancer screening 08/26/2020     She  has a past surgical history that includes Bunionectomy; Tubal ligation; ELBOW FRACTURE REPAIR (Right, 08/31/2020); and Fracture surgery. Her family history includes Hypertension in her mother; Kidney cancer in her father; Lung cancer (age of onset: 52) in her father; Melanoma (age of onset: 25) in her sister; No Known Problems in her brother, maternal aunt, maternal aunt, maternal aunt, maternal aunt, maternal aunt, maternal aunt, maternal aunt, maternal aunt, maternal grandfather, maternal grandmother, paternal aunt, paternal aunt, paternal grandfather, and paternal grandmother; Parkinsonism in her mother. She  reports that she quit smoking about 23 years ago. Her smoking use included cigarettes. She started smoking about 43 years ago. She has a 30.00 pack-year smoking history. She has never used smokeless tobacco. She reports current alcohol use of about 7.0 standard drinks of alcohol per week. She reports that she does not currently use drugs after having used the following drugs: Marijuana. Frequency: 7.00 times per week. Current Outpatient Medications   Medication Sig Dispense Refill   • albuterol (PROVENTIL HFA,VENTOLIN HFA) 90 mcg/act inhaler Inhale 2 puffs every 6 (six) hours as needed for wheezing or shortness of breath 8 g 5   • amLODIPine (NORVASC) 5 mg tablet Take 1 tablet (5 mg total) by mouth daily No child proof caps 90 tablet 1   • amoxicillin-clavulanate (AUGMENTIN) 875-125 mg per tablet Take 1 tablet by mouth every 12 (twelve) hours for 7 days 14 tablet 0   • clobetasol (TEMOVATE) 0.05 % cream as needed     • guaiFENesin (MUCINEX) 600 mg 12 hr tablet Take 1 tablet (600 mg total) by mouth every 12 (twelve) hours 20 tablet 0   • losartan (Cozaar) 100 MG tablet Take 1 tablet (100 mg total) by mouth daily 90 tablet 1   • predniSONE 20 mg tablet Take 2 tablets (40 mg total) by mouth daily for 5 days 10 tablet 0   • venlafaxine (EFFEXOR-XR) 75 mg 24 hr capsule Take 1 capsule (75 mg total) by mouth daily with breakfast 90 capsule 1     No current facility-administered medications for this visit.      Current Outpatient Medications on File Prior to Visit   Medication Sig   • albuterol (PROVENTIL HFA,VENTOLIN HFA) 90 mcg/act inhaler Inhale 2 puffs every 6 (six) hours as needed for wheezing or shortness of breath   • amLODIPine (NORVASC) 5 mg tablet Take 1 tablet (5 mg total) by mouth daily No child proof caps   • clobetasol (TEMOVATE) 0.05 % cream as needed   • guaiFENesin (MUCINEX) 600 mg 12 hr tablet Take 1 tablet (600 mg total) by mouth every 12 (twelve) hours   • losartan (Cozaar) 100 MG tablet Take 1 tablet (100 mg total) by mouth daily   • venlafaxine (EFFEXOR-XR) 75 mg 24 hr capsule Take 1 capsule (75 mg total) by mouth daily with breakfast     No current facility-administered medications on file prior to visit. She has No Known Allergies. .    Review of Systems   Constitutional: Positive for diaphoresis (after coughing ). Negative for activity change, chills, fatigue, fever and unexpected weight change. HENT: Negative for ear pain, postnasal drip, rhinorrhea, sinus pressure and sore throat. Eyes: Negative for pain. Respiratory: Positive for cough (productive of yellow phlegm ), shortness of breath (on exertion - smoked to smoke 2 joints of  marijuana daily and quit smoking 23 years ago , smoked for about 25 years ) and wheezing. Negative for choking and chest tightness. Cardiovascular: Positive for palpitations (occasional tachycardia when coughign ). Negative for chest pain and leg swelling. Gastrointestinal: Negative for abdominal pain, constipation, diarrhea, nausea and vomiting. Genitourinary: Negative for dysuria and hematuria. Musculoskeletal: Negative for arthralgias, back pain, gait problem, joint swelling, myalgias and neck stiffness. Skin: Negative for pallor and rash. Neurological: Negative for dizziness, tremors, seizures, syncope, light-headedness and headaches. Hematological: Negative for adenopathy. Psychiatric/Behavioral: Negative for behavioral problems. Objective:      /82 (BP Location: Left arm, Patient Position: Sitting, Cuff Size: Standard)   Pulse (!) 115   Temp 98.7 °F (37.1 °C) (Temporal)   Ht 5' 6.5" (1.689 m)   Wt 61.7 kg (136 lb)   SpO2 90%   BMI 21.62 kg/m²          Physical Exam  Constitutional:       General: She is not in acute distress. Appearance: She is well-developed. She is not diaphoretic. HENT:      Head: Normocephalic and atraumatic. Right Ear: External ear normal. Tympanic membrane is injected.       Left Ear: External ear normal.      Nose: Mucosal edema (in the left nasal cavity devika) present. Mouth/Throat:      Mouth: Mucous membranes are dry. Pharynx: Posterior oropharyngeal erythema present. No oropharyngeal exudate. Eyes:      General: No scleral icterus. Right eye: No discharge. Left eye: No discharge. Conjunctiva/sclera: Conjunctivae normal.      Pupils: Pupils are equal, round, and reactive to light. Neck:      Thyroid: No thyromegaly. Vascular: No JVD. Trachea: No tracheal deviation. Cardiovascular:      Rate and Rhythm: Tachycardia present. Rhythm irregularly irregular. Heart sounds: Normal heart sounds. No murmur heard. No friction rub. No gallop. Comments: Tachycardia with an irregularly irregular rhythm  Recheck of heart rate at the end of her visit was 100 bpm and still irregular  Pulmonary:      Effort: Pulmonary effort is normal. No respiratory distress. Breath sounds: Examination of the right-upper field reveals wheezing and rhonchi. Examination of the left-upper field reveals wheezing and rhonchi. Examination of the right-middle field reveals wheezing and rhonchi. Examination of the left-middle field reveals wheezing and rhonchi. Examination of the right-lower field reveals wheezing and rhonchi. Examination of the left-lower field reveals wheezing. Wheezing and rhonchi (isp and exp wheezes and rhonchi in all lung fields) present. No rales. Chest:      Chest wall: No tenderness. Abdominal:      General: Bowel sounds are normal. There is no distension. Palpations: Abdomen is soft. There is no mass. Tenderness: There is no abdominal tenderness. There is no guarding or rebound. Musculoskeletal:         General: No tenderness or deformity. Normal range of motion. Cervical back: Normal range of motion and neck supple. Lymphadenopathy:      Cervical: No cervical adenopathy. Skin:     General: Skin is warm and dry. Coloration: Skin is not pale.       Findings: No erythema or rash. Neurological:      Mental Status: She is alert and oriented to person, place, and time. Cranial Nerves: No cranial nerve deficit. Motor: No abnormal muscle tone. Coordination: Coordination normal.      Deep Tendon Reflexes: Reflexes are normal and symmetric. Psychiatric:         Behavior: Behavior normal.           No visits with results within 12 Month(s) from this visit. Latest known visit with results is:   Appointment on 07/15/2022   Component Date Value Ref Range Status   • Cholesterol 07/15/2022 243 (H)  See Comment mg/dL Final    Cholesterol:         Pediatric <18 Years        Desirable          <170 mg/dL      Borderline High    170-199 mg/dL      High               >=200 mg/dL        Adult >=18 Years            Desirable         <200 mg/dL      Borderline High   200-239 mg/dL      High              >239 mg/dL     • Triglycerides 07/15/2022 90  See Comment mg/dL Final    Triglyceride:     0-9Y            <75mg/dL     10Y-17Y         <90 mg/dL       >=18Y     Normal          <150 mg/dL     Borderline High 150-199 mg/dL     High            200-499 mg/dL        Very High       >499 mg/dL    Specimen collection should occur prior to N-Acetylcysteine or Metamizole administration due to the potential for falsely depressed results. • HDL, Direct 07/15/2022 68  >=50 mg/dL Final    Specimen collection should occur prior to Metamizole administration due to the potential for falsley depressed results. • LDL Calculated 07/15/2022 157 (H)  0 - 100 mg/dL Final    LDL Cholesterol:     Optimal           <100 mg/dl     Near Optimal      100-129 mg/dl     Above Optimal       Borderline High 130-159 mg/dl       High            160-189 mg/dl       Very High       >189 mg/dl         This screening LDL is a calculated result. It does not have the accuracy of the Direct Measured LDL in the monitoring of patients with hyperlipidemia and/or statin therapy.    Direct Measure LDL ([id number]) must be ordered separately in these patients.    • Non-HDL-Chol (CHOL-HDL) 07/15/2022 175  mg/dl Final

## 2023-08-23 ENCOUNTER — APPOINTMENT (OUTPATIENT)
Dept: LAB | Age: 68
End: 2023-08-23
Payer: MEDICARE

## 2023-08-23 DIAGNOSIS — E78.49 OTHER HYPERLIPIDEMIA: ICD-10-CM

## 2023-08-23 DIAGNOSIS — Z13.1 SCREENING FOR DIABETES MELLITUS: ICD-10-CM

## 2023-08-23 DIAGNOSIS — J45.21 MILD INTERMITTENT ASTHMA WITH ACUTE EXACERBATION: ICD-10-CM

## 2023-08-23 DIAGNOSIS — J45.20 MILD INTERMITTENT REACTIVE AIRWAY DISEASE WITHOUT COMPLICATION: ICD-10-CM

## 2023-08-23 DIAGNOSIS — D72.19 OTHER EOSINOPHILIA: ICD-10-CM

## 2023-08-23 DIAGNOSIS — R06.09 DYSPNEA ON EXERTION: ICD-10-CM

## 2023-08-23 DIAGNOSIS — R00.0 TACHYCARDIA: ICD-10-CM

## 2023-08-23 DIAGNOSIS — R05.3 CHRONIC COUGH: ICD-10-CM

## 2023-08-23 DIAGNOSIS — E87.1 HYPONATREMIA: ICD-10-CM

## 2023-08-23 DIAGNOSIS — R73.03 PREDIABETES: ICD-10-CM

## 2023-08-23 DIAGNOSIS — I10 ESSENTIAL HYPERTENSION: ICD-10-CM

## 2023-08-23 DIAGNOSIS — Z12.31 ENCOUNTER FOR SCREENING MAMMOGRAM FOR MALIGNANT NEOPLASM OF BREAST: ICD-10-CM

## 2023-08-23 DIAGNOSIS — F32.9 REACTIVE DEPRESSION: ICD-10-CM

## 2023-08-23 DIAGNOSIS — J06.9 URTI (ACUTE UPPER RESPIRATORY INFECTION): ICD-10-CM

## 2023-08-23 DIAGNOSIS — I49.8 OTHER CARDIAC ARRHYTHMIA: ICD-10-CM

## 2023-08-23 DIAGNOSIS — D58.2 ELEVATED HEMOGLOBIN (HCC): ICD-10-CM

## 2023-08-23 LAB
ALBUMIN SERPL BCP-MCNC: 4.6 G/DL (ref 3.5–5)
ALP SERPL-CCNC: 92 U/L (ref 34–104)
ALT SERPL W P-5'-P-CCNC: 20 U/L (ref 7–52)
ANION GAP SERPL CALCULATED.3IONS-SCNC: 11 MMOL/L
AST SERPL W P-5'-P-CCNC: 18 U/L (ref 13–39)
BASOPHILS # BLD AUTO: 0.08 THOUSANDS/ÂΜL (ref 0–0.1)
BASOPHILS NFR BLD AUTO: 1 % (ref 0–1)
BILIRUB SERPL-MCNC: 1.03 MG/DL (ref 0.2–1)
BUN SERPL-MCNC: 10 MG/DL (ref 5–25)
CALCIUM SERPL-MCNC: 10 MG/DL (ref 8.4–10.2)
CHLORIDE SERPL-SCNC: 96 MMOL/L (ref 96–108)
CHOLEST SERPL-MCNC: 213 MG/DL
CO2 SERPL-SCNC: 29 MMOL/L (ref 21–32)
CREAT SERPL-MCNC: 0.83 MG/DL (ref 0.6–1.3)
EOSINOPHIL # BLD AUTO: 0.04 THOUSAND/ÂΜL (ref 0–0.61)
EOSINOPHIL NFR BLD AUTO: 1 % (ref 0–6)
ERYTHROCYTE [DISTWIDTH] IN BLOOD BY AUTOMATED COUNT: 12 % (ref 11.6–15.1)
EST. AVERAGE GLUCOSE BLD GHB EST-MCNC: 137 MG/DL
GFR SERPL CREATININE-BSD FRML MDRD: 74 ML/MIN/1.73SQ M
GLUCOSE P FAST SERPL-MCNC: 126 MG/DL (ref 65–99)
HBA1C MFR BLD: 6.4 %
HCT VFR BLD AUTO: 52.1 % (ref 34.8–46.1)
HDLC SERPL-MCNC: 60 MG/DL
HGB BLD-MCNC: 17.7 G/DL (ref 11.5–15.4)
IMM GRANULOCYTES # BLD AUTO: 0.02 THOUSAND/UL (ref 0–0.2)
IMM GRANULOCYTES NFR BLD AUTO: 0 % (ref 0–2)
LDLC SERPL CALC-MCNC: 136 MG/DL (ref 0–100)
LYMPHOCYTES # BLD AUTO: 2.67 THOUSANDS/ÂΜL (ref 0.6–4.47)
LYMPHOCYTES NFR BLD AUTO: 34 % (ref 14–44)
MAGNESIUM SERPL-MCNC: 1.9 MG/DL (ref 1.9–2.7)
MCH RBC QN AUTO: 31.1 PG (ref 26.8–34.3)
MCHC RBC AUTO-ENTMCNC: 34 G/DL (ref 31.4–37.4)
MCV RBC AUTO: 92 FL (ref 82–98)
MONOCYTES # BLD AUTO: 0.73 THOUSAND/ÂΜL (ref 0.17–1.22)
MONOCYTES NFR BLD AUTO: 9 % (ref 4–12)
NEUTROPHILS # BLD AUTO: 4.44 THOUSANDS/ÂΜL (ref 1.85–7.62)
NEUTS SEG NFR BLD AUTO: 55 % (ref 43–75)
NONHDLC SERPL-MCNC: 153 MG/DL
NRBC BLD AUTO-RTO: 0 /100 WBCS
PLATELET # BLD AUTO: 284 THOUSANDS/UL (ref 149–390)
PMV BLD AUTO: 10.6 FL (ref 8.9–12.7)
POTASSIUM SERPL-SCNC: 4.3 MMOL/L (ref 3.5–5.3)
PROT SERPL-MCNC: 7.6 G/DL (ref 6.4–8.4)
RBC # BLD AUTO: 5.69 MILLION/UL (ref 3.81–5.12)
SODIUM SERPL-SCNC: 136 MMOL/L (ref 135–147)
TRIGL SERPL-MCNC: 86 MG/DL
TSH SERPL DL<=0.05 MIU/L-ACNC: 0.54 UIU/ML (ref 0.45–4.5)
WBC # BLD AUTO: 7.98 THOUSAND/UL (ref 4.31–10.16)

## 2023-08-23 PROCEDURE — 83036 HEMOGLOBIN GLYCOSYLATED A1C: CPT

## 2023-08-23 PROCEDURE — 84443 ASSAY THYROID STIM HORMONE: CPT

## 2023-08-23 PROCEDURE — 80061 LIPID PANEL: CPT

## 2023-08-23 PROCEDURE — 83735 ASSAY OF MAGNESIUM: CPT

## 2023-08-23 PROCEDURE — 80053 COMPREHEN METABOLIC PANEL: CPT

## 2023-08-23 PROCEDURE — 36415 COLL VENOUS BLD VENIPUNCTURE: CPT

## 2023-08-23 PROCEDURE — 85025 COMPLETE CBC W/AUTO DIFF WBC: CPT

## 2023-08-28 ENCOUNTER — OFFICE VISIT (OUTPATIENT)
Dept: INTERNAL MEDICINE CLINIC | Age: 68
End: 2023-08-28
Payer: MEDICARE

## 2023-08-28 VITALS
OXYGEN SATURATION: 94 % | BODY MASS INDEX: 21.97 KG/M2 | TEMPERATURE: 97.4 F | HEART RATE: 78 BPM | DIASTOLIC BLOOD PRESSURE: 76 MMHG | SYSTOLIC BLOOD PRESSURE: 122 MMHG | HEIGHT: 67 IN | WEIGHT: 140 LBS

## 2023-08-28 DIAGNOSIS — F12.90 MARIJUANA USE: ICD-10-CM

## 2023-08-28 DIAGNOSIS — R73.03 PREDIABETES: ICD-10-CM

## 2023-08-28 DIAGNOSIS — J06.9 URTI (ACUTE UPPER RESPIRATORY INFECTION): ICD-10-CM

## 2023-08-28 DIAGNOSIS — J45.21 MILD INTERMITTENT ASTHMA WITH ACUTE EXACERBATION: Primary | ICD-10-CM

## 2023-08-28 DIAGNOSIS — E78.49 OTHER HYPERLIPIDEMIA: ICD-10-CM

## 2023-08-28 PROCEDURE — 99214 OFFICE O/P EST MOD 30 MIN: CPT | Performed by: INTERNAL MEDICINE

## 2023-08-28 RX ORDER — DIPHENOXYLATE HYDROCHLORIDE AND ATROPINE SULFATE 2.5; .025 MG/1; MG/1
1 TABLET ORAL DAILY
COMMUNITY

## 2023-08-28 NOTE — PROGRESS NOTES
Assessment/Plan:    Mild intermittent asthma with acute exacerbation  -Resolved  -Continue with as needed albuterol inhaler    Upper respiratory tract infection  -Almost completely resolved  -Finish antibiotic as prescribed and continue with Mucinex, Flonase and analgesics as needed    Hyperlipidemia  -improving  - Lipid panel done on 8/23/2023 showed total cholesterol 213, down from 243 on 7/15/2020, triglyceride of 86, down from 90, HDL of 60, down from 68 and LDL of 176, down from 157  -Calculated 10-year ASCVD risk is 6.8%  -Patient was counseled to eat a heart healthy diet and exercise    Prediabetes  -CMP done on 8/22/2023 showed fasting blood glucose of 126 with hemoglobin A1c of 6.4  -Patient was counseled to cut down on carbs and sweets  -Continue with exercise  -All lab results were reviewed with patient and all questions answered    Cardiac arrhythmia  -Heart rhythm is much improved today. Heart rate is normal but irregular and she has occasional extra beats.  -Follow-up with cardiology as scheduled    Marijuana use  -Resolved, patient states that she stopped smoking marijuana  -She was counseled to not restart    The 10-year ASCVD risk score (Albert CHAVIRA, et al., 2019) is: 6.8%    Values used to calculate the score:      Age: 74 years      Sex: Female      Is Non- : No      Diabetic: No      Tobacco smoker: No      Systolic Blood Pressure: 847 mmHg      Is BP treated: Yes      HDL Cholesterol: 60 mg/dL      Total Cholesterol: 213 mg/dL     Diagnoses and all orders for this visit:    Mild intermittent asthma with acute exacerbation    URTI (acute upper respiratory infection)    Marijuana use    Prediabetes    Other hyperlipidemia    Other orders  -     multivitamin (THERAGRAN) TABS; Take 1 tablet by mouth daily          Subjective:      Patient ID: Candido Martin is a 74 y.o. female.     HPI    Patient presents for a follow-up visit regarding her mild intermittent asthma with acute exacerbation, upper respiratory tract infection, prediabetes. She was seen in our office 1 week ago with acute asthma exacerbation with cardiac arrhythmia and had lab tests ordered and would like to review the results of her labs. She states that she has been taking her medications as prescribed. Today pt states that her cough is much better and shortness of breath and wheezing are better and she has even been without any complaints. She has completed the prednisone and will complete the Augmentin today. She states that the prednisone did not make her crazy   She admits that she is still coughing up a little phlegm but states that it is much reduced and now clear colored as opposed to yellow. She had blood work done and wants to review her results  Hemoglobin a 1 c and fasting blood glucose were elevated and she admits that she has not been watching her carbs but will start doing so. She states that she has stopped smoking the marijuana. She denies a family hx of DM. She made an appointment to follow-up with cardiology for her cardiac arrhythmia. She states that she still occasionally gets dizzy bruising on her skin whenever she bumps into things. She has been off the Cymbalta for 1 month and is currently taking Effexor which is working for her. She denies fever, chills, night sweats, headache, dizziness, chest pain, shortness of breath, palpitations, nausea, vomiting, abdominal pain, diarrhea, constipation, myalgias, arthralgias. The following portions of the patient's history were reviewed and updated as appropriate:   She  has a past medical history of Asthma, Depression, History of transfusion, Hypertension, and PONV (postoperative nausea and vomiting).   She   Patient Active Problem List    Diagnosis Date Noted   • Arrhythmia 08/21/2023   • Tachycardia 08/21/2023   • Mild intermittent asthma with acute exacerbation 08/21/2023   • URTI (acute upper respiratory infection) 08/21/2023   • Chronic cough 07/17/2023   • Reactive airway disease without complication 20/27/7042   • Influenza vaccination declined by patient 12/09/2021   • Nail abnormalities 09/09/2021   • Reactive depression 09/09/2021   • Medication side effect, initial encounter 09/09/2021   • Dyspnea on exertion 06/02/2021   • Hyponatremia 06/02/2021   • Other hyperlipidemia 05/05/2021   • Prediabetes 05/05/2021   • Marijuana use 05/05/2021   • Aftercare following surgery 10/08/2020   • Smoking 08/31/2020   • Essential hypertension 08/27/2020   • Closed fracture of olecranon process of right ulna 08/27/2020   • Pain and swelling of upper extremity, right 08/26/2020   • Bruising of multiple areas 08/26/2020   • Right elbow pain 08/26/2020   • Breast cancer screening 08/26/2020   • Colon cancer screening 08/26/2020     She  has a past surgical history that includes Bunionectomy; Tubal ligation; ELBOW FRACTURE REPAIR (Right, 08/31/2020); and Fracture surgery. Her family history includes Hypertension in her mother; Kidney cancer in her father; Lung cancer (age of onset: 52) in her father; Melanoma (age of onset: 25) in her sister; No Known Problems in her brother, maternal aunt, maternal aunt, maternal aunt, maternal aunt, maternal aunt, maternal aunt, maternal aunt, maternal aunt, maternal grandfather, maternal grandmother, paternal aunt, paternal aunt, paternal grandfather, and paternal grandmother; Parkinsonism in her mother. She  reports that she quit smoking about 23 years ago. Her smoking use included cigarettes. She started smoking about 43 years ago. She has a 30.00 pack-year smoking history. She has never used smokeless tobacco. She reports current alcohol use of about 7.0 standard drinks of alcohol per week. She reports that she does not currently use drugs after having used the following drugs: Marijuana. Frequency: 7.00 times per week.   Current Outpatient Medications   Medication Sig Dispense Refill   • albuterol (PROVENTIL HFA,VENTOLIN HFA) 90 mcg/act inhaler Inhale 2 puffs every 6 (six) hours as needed for wheezing or shortness of breath 8 g 5   • amLODIPine (NORVASC) 5 mg tablet Take 1 tablet (5 mg total) by mouth daily No child proof caps 90 tablet 1   • clobetasol (TEMOVATE) 0.05 % cream as needed     • losartan (Cozaar) 100 MG tablet Take 1 tablet (100 mg total) by mouth daily 90 tablet 1   • multivitamin (THERAGRAN) TABS Take 1 tablet by mouth daily     • venlafaxine (EFFEXOR-XR) 75 mg 24 hr capsule Take 1 capsule (75 mg total) by mouth daily with breakfast 90 capsule 1     No current facility-administered medications for this visit. Current Outpatient Medications on File Prior to Visit   Medication Sig   • albuterol (PROVENTIL HFA,VENTOLIN HFA) 90 mcg/act inhaler Inhale 2 puffs every 6 (six) hours as needed for wheezing or shortness of breath   • amLODIPine (NORVASC) 5 mg tablet Take 1 tablet (5 mg total) by mouth daily No child proof caps   • clobetasol (TEMOVATE) 0.05 % cream as needed   • losartan (Cozaar) 100 MG tablet Take 1 tablet (100 mg total) by mouth daily   • multivitamin (THERAGRAN) TABS Take 1 tablet by mouth daily   • venlafaxine (EFFEXOR-XR) 75 mg 24 hr capsule Take 1 capsule (75 mg total) by mouth daily with breakfast   • [DISCONTINUED] amoxicillin-clavulanate (AUGMENTIN) 875-125 mg per tablet Take 1 tablet by mouth every 12 (twelve) hours for 7 days   • [DISCONTINUED] guaiFENesin (MUCINEX) 600 mg 12 hr tablet Take 1 tablet (600 mg total) by mouth every 12 (twelve) hours     No current facility-administered medications on file prior to visit. She has No Known Allergies. .    Review of Systems   Constitutional: Negative for activity change, chills, fatigue, fever and unexpected weight change. HENT: Negative for ear pain, postnasal drip, rhinorrhea, sinus pressure and sore throat. Eyes: Negative for pain. Respiratory: Positive for cough (productive of clear colored phlegm and much improved ). Negative for choking, chest tightness, shortness of breath and wheezing. Cardiovascular: Negative for chest pain, palpitations and leg swelling. Gastrointestinal: Negative for abdominal pain, constipation, diarrhea, nausea and vomiting. Genitourinary: Negative for dysuria and hematuria. Musculoskeletal: Negative for arthralgias, back pain, gait problem, joint swelling, myalgias and neck stiffness. Skin: Positive for color change (still has echymoses and easy briusing ). Negative for pallor and rash. Neurological: Negative for dizziness, tremors, seizures, syncope, light-headedness and headaches. Hematological: Negative for adenopathy. Psychiatric/Behavioral: Negative for behavioral problems. Objective:      /76 (BP Location: Left arm, Patient Position: Sitting, Cuff Size: Standard)   Pulse 78   Temp (!) 97.4 °F (36.3 °C) (Temporal)   Ht 5' 6.5" (1.689 m)   Wt 63.5 kg (140 lb)   SpO2 94% Comment: room air  BMI 22.26 kg/m²          Physical Exam  Constitutional:       General: She is not in acute distress. Appearance: She is well-developed. She is not diaphoretic. HENT:      Head: Normocephalic and atraumatic. Right Ear: External ear normal.      Left Ear: External ear normal.      Nose: Mucosal edema and congestion present. Mouth/Throat:      Mouth: Mucous membranes are dry. Pharynx: Posterior oropharyngeal erythema present. No pharyngeal swelling or oropharyngeal exudate. Eyes:      General: No scleral icterus. Right eye: No discharge. Left eye: No discharge. Conjunctiva/sclera: Conjunctivae normal.      Pupils: Pupils are equal, round, and reactive to light. Neck:      Thyroid: No thyromegaly. Vascular: No JVD. Trachea: No tracheal deviation. Cardiovascular:      Rate and Rhythm: Normal rate and regular rhythm.  Extrasystoles (irregular heart rhythm with a few extrasystoles , much improved compared to last week ) are present. Heart sounds: Normal heart sounds. No murmur heard. No friction rub. No gallop. Pulmonary:      Effort: Pulmonary effort is normal. No respiratory distress. Breath sounds: Examination of the left-lower field reveals rales. Decreased breath sounds (wheezes and rhonchi are all resolved) and rales (few rales in the LLL zone, not completely cleared by coughing ) present. No wheezing ( ). Chest:      Chest wall: No tenderness. Abdominal:      General: Bowel sounds are normal. There is no distension. Palpations: Abdomen is soft. There is no mass. Tenderness: There is no abdominal tenderness. There is no guarding or rebound. Musculoskeletal:         General: No tenderness or deformity. Normal range of motion. Cervical back: Normal range of motion and neck supple. Lymphadenopathy:      Cervical: No cervical adenopathy. Skin:     General: Skin is warm and dry. Coloration: Skin is not pale. Findings: Bruising (Scattered areas of bruising on the extremities) present. No erythema or rash. Neurological:      Mental Status: She is alert and oriented to person, place, and time. Cranial Nerves: No cranial nerve deficit. Motor: No abnormal muscle tone. Coordination: Coordination normal.      Deep Tendon Reflexes: Reflexes are normal and symmetric. Psychiatric:         Behavior: Behavior normal.           Appointment on 08/23/2023   Component Date Value Ref Range Status   • Hemoglobin A1C 08/23/2023 6.4 (H)  Normal 4.0-5.6%; PreDiabetic 5.7-6.4%;  Diabetic >=6.5%; Glycemic control for adults with diabetes <7.0% % Final   • EAG 08/23/2023 137  mg/dl Final   • WBC 08/23/2023 7.98  4.31 - 10.16 Thousand/uL Final   • RBC 08/23/2023 5.69 (H)  3.81 - 5.12 Million/uL Final   • Hemoglobin 08/23/2023 17.7 (H)  11.5 - 15.4 g/dL Final   • Hematocrit 08/23/2023 52.1 (H)  34.8 - 46.1 % Final   • MCV 08/23/2023 92  82 - 98 fL Final   • MCH 08/23/2023 31.1  26.8 - 34.3 pg Final   • MCHC 08/23/2023 34.0  31.4 - 37.4 g/dL Final   • RDW 08/23/2023 12.0  11.6 - 15.1 % Final   • MPV 08/23/2023 10.6  8.9 - 12.7 fL Final   • Platelets 72/21/3502 284  149 - 390 Thousands/uL Final   • nRBC 08/23/2023 0  /100 WBCs Final   • Neutrophils Relative 08/23/2023 55  43 - 75 % Final   • Immat GRANS % 08/23/2023 0  0 - 2 % Final   • Lymphocytes Relative 08/23/2023 34  14 - 44 % Final   • Monocytes Relative 08/23/2023 9  4 - 12 % Final   • Eosinophils Relative 08/23/2023 1  0 - 6 % Final   • Basophils Relative 08/23/2023 1  0 - 1 % Final   • Neutrophils Absolute 08/23/2023 4.44  1.85 - 7.62 Thousands/µL Final   • Immature Grans Absolute 08/23/2023 0.02  0.00 - 0.20 Thousand/uL Final   • Lymphocytes Absolute 08/23/2023 2.67  0.60 - 4.47 Thousands/µL Final   • Monocytes Absolute 08/23/2023 0.73  0.17 - 1.22 Thousand/µL Final   • Eosinophils Absolute 08/23/2023 0.04  0.00 - 0.61 Thousand/µL Final   • Basophils Absolute 08/23/2023 0.08  0.00 - 0.10 Thousands/µL Final   • Cholesterol 08/23/2023 213 (H)  See Comment mg/dL Final    Cholesterol:         Pediatric <18 Years        Desirable          <170 mg/dL      Borderline High    170-199 mg/dL      High               >=200 mg/dL        Adult >=18 Years            Desirable         <200 mg/dL      Borderline High   200-239 mg/dL      High              >239 mg/dL     • Triglycerides 08/23/2023 86  See Comment mg/dL Final    Triglyceride:     0-9Y            <75mg/dL     10Y-17Y         <90 mg/dL       >=18Y     Normal          <150 mg/dL     Borderline High 150-199 mg/dL     High            200-499 mg/dL        Very High       >499 mg/dL    Specimen collection should occur prior to Metamizole administration due to the potential for falsely depressed results.    • HDL, Direct 08/23/2023 60  >=50 mg/dL Final   • LDL Calculated 08/23/2023 136 (H)  0 - 100 mg/dL Final    LDL Cholesterol:     Optimal           <100 mg/dl     Near Optimal      100-129 mg/dl     Above Optimal       Borderline High 130-159 mg/dl       High            160-189 mg/dl       Very High       >189 mg/dl         This screening LDL is a calculated result. It does not have the accuracy of the Direct Measured LDL in the monitoring of patients with hyperlipidemia and/or statin therapy. Direct Measure LDL (BLE538) must be ordered separately in these patients. • Non-HDL-Chol (CHOL-HDL) 08/23/2023 153  mg/dl Final   • TSH 3RD GENERATON 08/23/2023 0.540  0.450 - 4.500 uIU/mL Final    The recommended reference ranges for TSH during pregnancy are as follows:   First trimester 0.1 to 2.5 uIU/mL   Second trimester  0.2 to 3.0 uIU/mL   Third trimester 0.3 to 3.0 uIU/m    Note: Normal ranges may not apply to patients who are transgender, non-binary, or whose legal sex, sex at birth, and gender identity differ. Adult TSH (3rd generation) reference range follows the recommended guidelines of the [employer], January, 2020. • Sodium 08/23/2023 136  135 - 147 mmol/L Final   • Potassium 08/23/2023 4.3  3.5 - 5.3 mmol/L Final   • Chloride 08/23/2023 96  96 - 108 mmol/L Final   • CO2 08/23/2023 29  21 - 32 mmol/L Final   • ANION GAP 08/23/2023 11  mmol/L Final   • BUN 08/23/2023 10  5 - 25 mg/dL Final   • Creatinine 08/23/2023 0.83  0.60 - 1.30 mg/dL Final    Standardized to IDMS reference method   • Glucose, Fasting 08/23/2023 126 (H)  65 - 99 mg/dL Final   • Calcium 08/23/2023 10.0  8.4 - 10.2 mg/dL Final   • AST 08/23/2023 18  13 - 39 U/L Final   • ALT 08/23/2023 20  7 - 52 U/L Final    Specimen collection should occur prior to Sulfasalazine administration due to the potential for falsely depressed results.     • Alkaline Phosphatase 08/23/2023 92  34 - 104 U/L Final   • Total Protein 08/23/2023 7.6  6.4 - 8.4 g/dL Final   • Albumin 08/23/2023 4.6  3.5 - 5.0 g/dL Final   • Total Bilirubin 08/23/2023 1.03 (H)  0.20 - 1.00 mg/dL Final    Use of this assay is not recommended for patients undergoing treatment with eltrombopag due to the potential for falsely elevated results. N-acetyl-p-benzoquinone imine (metabolite of Acetaminophen) will generate erroneously low results in samples for patients that have taken an overdose of Acetaminophen.    • eGFR 08/23/2023 74  ml/min/1.73sq m Final   • Magnesium 08/23/2023 1.9  1.9 - 2.7 mg/dL Final

## 2023-09-12 ENCOUNTER — OFFICE VISIT (OUTPATIENT)
Dept: INTERNAL MEDICINE CLINIC | Age: 68
End: 2023-09-12
Payer: MEDICARE

## 2023-09-12 VITALS
SYSTOLIC BLOOD PRESSURE: 130 MMHG | OXYGEN SATURATION: 94 % | BODY MASS INDEX: 21.35 KG/M2 | HEART RATE: 102 BPM | DIASTOLIC BLOOD PRESSURE: 82 MMHG | HEIGHT: 67 IN | TEMPERATURE: 97.6 F | WEIGHT: 136 LBS

## 2023-09-12 DIAGNOSIS — I10 ESSENTIAL HYPERTENSION: Primary | ICD-10-CM

## 2023-09-12 DIAGNOSIS — R05.3 CHRONIC COUGH: ICD-10-CM

## 2023-09-12 DIAGNOSIS — Z72.0 TOBACCO ABUSE: ICD-10-CM

## 2023-09-12 DIAGNOSIS — J44.1 ACUTE EXACERBATION OF CHRONIC OBSTRUCTIVE PULMONARY DISEASE (COPD) (HCC): ICD-10-CM

## 2023-09-12 DIAGNOSIS — Z12.2 ENCOUNTER FOR SCREENING FOR LUNG CANCER: ICD-10-CM

## 2023-09-12 DIAGNOSIS — F17.200 SMOKING: ICD-10-CM

## 2023-09-12 LAB
SARS-COV-2 AG UPPER RESP QL IA: NEGATIVE
VALID CONTROL: NORMAL

## 2023-09-12 PROCEDURE — 99214 OFFICE O/P EST MOD 30 MIN: CPT | Performed by: INTERNAL MEDICINE

## 2023-09-12 PROCEDURE — 87811 SARS-COV-2 COVID19 W/OPTIC: CPT | Performed by: INTERNAL MEDICINE

## 2023-09-12 RX ORDER — PREDNISONE 20 MG/1
40 TABLET ORAL DAILY
Qty: 10 TABLET | Refills: 0 | Status: SHIPPED | OUTPATIENT
Start: 2023-09-12 | End: 2023-09-17

## 2023-09-12 NOTE — ASSESSMENT & PLAN NOTE
Most likely patient have underlying COPD/emphysema due to chronic cigarette smoking. We will treat acute episode with prednisone 40 mg daily for 5 days. At present no indication for antibiotic. COVID test done in office today is negative. Continue with albuterol inhaler and will also add Trelegy 1 puff daily. Continue to stay away from smoking. Also advised to get pulmonary function tests about 2 weeks from now or after she improved significantly from acute episode.   On next visit probably will also need CT scan of lung cancer screening program.

## 2023-09-12 NOTE — PROGRESS NOTES
Assessment/Plan:    Acute exacerbation of chronic obstructive pulmonary disease (COPD) ([address])  Most likely patient have underlying COPD/emphysema due to chronic cigarette smoking. We will treat acute episode with prednisone 40 mg daily for 5 days. At present no indication for antibiotic. COVID test done in office today is negative. Continue with albuterol inhaler and will also add Trelegy 1 puff daily. Continue to stay away from smoking. Also advised to get pulmonary function tests about 2 weeks from now or after she improved significantly from acute episode. On next visit probably will also need CT scan of lung cancer screening program.    Tobacco abuse  Encouraged to stay away from any kind of tobacco abuse. Diagnoses and all orders for this visit:    Essential hypertension    Acute exacerbation of chronic obstructive pulmonary disease (COPD) ([address])  -     POCT Rapid Covid Ag  -     fluticasone-umeclidinium-vilanterol 200-62.5-25 mcg/actuation AEPB inhaler; Inhale 1 puff daily Rinse mouth after use. -     predniSONE 20 mg tablet; Take 2 tablets (40 mg total) by mouth daily for 5 days  -     Complete PFT with post bronchodilator; Future    Smoking    Encounter for screening for lung cancer    Tobacco abuse    Chronic cough            Lung Cancer Screening Shared Decision Making: I discussed with her that she is a candidate for lung cancer CT screening. The following Shared Decision-Making points were covered:  1. Benefits of screening were discussed, including the rates of reduction in death from lung cancer and other causes. Harms of screening were reviewed, including false positive tests, radiation exposure levels, risks of invasive procedures, risks of complications of screening, and risk of overdiagnosis. 2. I counseled on the importance of adherence to annual lung cancer LDCT screening, impact of co-morbidities, and ability or willingness to undergo diagnosis and treatment.   3. I counseled on the importance of maintaining abstinence as a former smoker or was counseled on the importance of smoking cessation if a current smoker    Review of Eligibility Criteria: She meets all of the criteria for Lung Cancer Screening.   - She is 74 y.o.   - She has 20 pack year tobacco history and is a current smoker or has quit within the past 15 years  - She presents no signs or symptoms of lung cancer    After discussion, the patient decided to elect lung cancer screening. Subjective:          Patient ID: Rossy Rascon is a 74 y.o. female. Patient came to office with a complaint of cough with chest tightness and occasional wheezing. She was treated with antibiotic and steroid along with albuterol inhaler about 3 weeks ago. As per patient she got better for a week or so and now the symptoms are back. She just quit smoking about 3 weeks ago. She did smoke about a pack per day for the last 30 to 35 years. The following portions of the patient's history were reviewed and updated as appropriate: allergies, current medications, past family history, past medical history, past social history, past surgical history and problem list.    Review of Systems   Constitutional: Negative for fatigue and fever. HENT: Negative for congestion, ear discharge, ear pain, postnasal drip, sinus pressure, sore throat, tinnitus and trouble swallowing. Eyes: Negative for discharge, itching and visual disturbance. Respiratory: Positive for cough and shortness of breath. Cardiovascular: Negative for chest pain and palpitations. Gastrointestinal: Negative for abdominal pain, diarrhea, nausea and vomiting. Endocrine: Negative for cold intolerance and polyuria. Genitourinary: Negative for difficulty urinating, dysuria and urgency. Musculoskeletal: Negative for arthralgias and neck pain. Skin: Negative for rash. Allergic/Immunologic: Negative for environmental allergies.    Neurological: Negative for dizziness, weakness and headaches. Psychiatric/Behavioral: Negative for agitation and behavioral problems. The patient is not nervous/anxious.           Past Medical History:   Diagnosis Date   • Asthma    • Depression    • History of transfusion    • Hypertension    • PONV (postoperative nausea and vomiting)          Current Outpatient Medications:   •  albuterol (PROVENTIL HFA,VENTOLIN HFA) 90 mcg/act inhaler, Inhale 2 puffs every 6 (six) hours as needed for wheezing or shortness of breath, Disp: 8 g, Rfl: 5  •  amLODIPine (NORVASC) 5 mg tablet, Take 1 tablet (5 mg total) by mouth daily No child proof caps, Disp: 90 tablet, Rfl: 1  •  clobetasol (TEMOVATE) 0.05 % cream, as needed, Disp: , Rfl:   •  fluticasone-umeclidinium-vilanterol 200-62.5-25 mcg/actuation AEPB inhaler, Inhale 1 puff daily Rinse mouth after use., Disp: 1 each, Rfl: 5  •  losartan (Cozaar) 100 MG tablet, Take 1 tablet (100 mg total) by mouth daily, Disp: 90 tablet, Rfl: 1  •  multivitamin (THERAGRAN) TABS, Take 1 tablet by mouth daily, Disp: , Rfl:   •  predniSONE 20 mg tablet, Take 2 tablets (40 mg total) by mouth daily for 5 days, Disp: 10 tablet, Rfl: 0  •  venlafaxine (EFFEXOR-XR) 75 mg 24 hr capsule, Take 1 capsule (75 mg total) by mouth daily with breakfast, Disp: 90 capsule, Rfl: 1    No Known Allergies    Social History   Past Surgical History:   Procedure Laterality Date   • BUNIONECTOMY     • ELBOW FRACTURE REPAIR Right 08/31/2020    Procedure: OPEN REDUCTION W/ INTERNAL FIXATION (ORIF) ELBOW;  Surgeon: Lianne Guerrero MD;  Location: [facility];  Service: Orthopedics   • FRACTURE SURGERY     • TUBAL LIGATION       Family History   Problem Relation Age of Onset   • Parkinsonism Mother    • Hypertension Mother    • Lung cancer Father 52   • Kidney cancer Father    • Melanoma Sister 18        upper right thigh   • No Known Problems Maternal Grandmother    • No Known Problems Maternal Grandfather    • No Known Problems Paternal Grandmother    • No Known Problems Paternal Grandfather    • No Known Problems Brother    • No Known Problems Maternal Aunt    • No Known Problems Maternal Aunt    • No Known Problems Maternal Aunt    • No Known Problems Maternal Aunt    • No Known Problems Maternal Aunt    • No Known Problems Maternal Aunt    • No Known Problems Maternal Aunt    • No Known Problems Maternal Aunt    • No Known Problems Paternal Aunt    • No Known Problems Paternal Aunt        Objective:  /82 (BP Location: Left arm, Patient Position: Sitting, Cuff Size: Standard)   Pulse 102   Temp 97.6 °F (36.4 °C) (Temporal)   Ht 5' 6.5" (1.689 m)   Wt 61.7 kg (136 lb)   SpO2 94% Comment: room air  BMI 21.62 kg/m²   Body mass index is 21.62 kg/m². Physical Exam  Constitutional:       Appearance: She is well-developed. She is not ill-appearing or diaphoretic. HENT:      Head: Normocephalic. Right Ear: External ear normal.      Left Ear: External ear normal.      Nose: No rhinorrhea. Mouth/Throat:      Pharynx: No posterior oropharyngeal erythema. Eyes:      General: No scleral icterus. Pupils: Pupils are equal, round, and reactive to light. Neck:      Thyroid: No thyromegaly. Trachea: No tracheal deviation. Cardiovascular:      Rate and Rhythm: Normal rate and regular rhythm. Heart sounds: Normal heart sounds. Pulmonary:      Effort: Pulmonary effort is normal. No respiratory distress. Breath sounds: Rhonchi present. Comments: Decreased breath sounds throughout lung fields with scattered rhonchi  Chest:      Chest wall: No tenderness. Abdominal:      General: Bowel sounds are normal.      Palpations: Abdomen is soft. There is no mass. Tenderness: There is no abdominal tenderness. Musculoskeletal:         General: Normal range of motion. Cervical back: Normal range of motion and neck supple. Right lower leg: No edema. Left lower leg: No edema.    Lymphadenopathy: Cervical: No cervical adenopathy. Skin:     General: Skin is warm. Neurological:      Mental Status: She is alert and oriented to person, place, and time. Cranial Nerves: No cranial nerve deficit. Psychiatric:         Mood and Affect: Mood normal.         Behavior: Behavior normal.         Thought Content:  Thought content normal.

## 2023-10-20 PROBLEM — J06.9 URTI (ACUTE UPPER RESPIRATORY INFECTION): Status: RESOLVED | Noted: 2023-08-21 | Resolved: 2023-10-20

## 2023-10-25 ENCOUNTER — OFFICE VISIT (OUTPATIENT)
Dept: INTERNAL MEDICINE CLINIC | Age: 68
End: 2023-10-25
Payer: MEDICARE

## 2023-10-25 VITALS
BODY MASS INDEX: 21.66 KG/M2 | HEART RATE: 80 BPM | TEMPERATURE: 98.6 F | DIASTOLIC BLOOD PRESSURE: 72 MMHG | WEIGHT: 138 LBS | SYSTOLIC BLOOD PRESSURE: 132 MMHG | HEIGHT: 67 IN | OXYGEN SATURATION: 96 %

## 2023-10-25 DIAGNOSIS — J45.21 MILD INTERMITTENT ASTHMA WITH ACUTE EXACERBATION: Primary | ICD-10-CM

## 2023-10-25 DIAGNOSIS — F12.90 MARIJUANA USE: ICD-10-CM

## 2023-10-25 DIAGNOSIS — J44.1 ACUTE EXACERBATION OF CHRONIC OBSTRUCTIVE PULMONARY DISEASE (COPD) (HCC): ICD-10-CM

## 2023-10-25 DIAGNOSIS — I49.9 CARDIAC ARRHYTHMIA, UNSPECIFIED CARDIAC ARRHYTHMIA TYPE: ICD-10-CM

## 2023-10-25 DIAGNOSIS — J45.20 MILD INTERMITTENT REACTIVE AIRWAY DISEASE WITHOUT COMPLICATION: ICD-10-CM

## 2023-10-25 PROCEDURE — 99214 OFFICE O/P EST MOD 30 MIN: CPT | Performed by: INTERNAL MEDICINE

## 2023-10-25 NOTE — PROGRESS NOTES
Assessment/Plan:    Reactive airway disease with acute exacerbation  -Concerning for COPD versus asthma  -Acute exacerbation has resolved  -Continue with Trelegy and as needed albuterol inhaler  -Patient was encouraged to go for PFT as soon as possible now that her exacerbation has resolved. Marijuana use  -Patient had reported that she quit smoking at her previous office visit but admits today that she still occasionally smokes.  -She was counseled to quit as the smoking as this likely triggers her exacerbations. Cardiac arrhythmia  -Currently in normal sinus rhythm today  -She was counseled to follow-up with cardiology as she was referred     Diagnoses and all orders for this visit:    Mild intermittent asthma with acute exacerbation    Marijuana use    Cardiac arrhythmia, unspecified cardiac arrhythmia type    Mild intermittent reactive airway disease without complication    Acute exacerbation of chronic obstructive pulmonary disease (COPD) ([address])          Subjective:      Patient ID: Derick Abdi is a 74 y.o. female. HPI  Patient presents for follow-up regarding her recent upper respiratory tract infection with acute bronchitis. Smoked for many years, up to 29 years. Up to 2 packs a day at the maximum. From age [age]  Quit at age 41 years  2 joints a day of marijuana a day since then, last smoked a couple of days ago   Then went from smoking cigarettes to smoking marijuana. She states that she was also exposed to 2nd hand smoke from  and sister, who she still lives with. She has never had a pulmonary function test but states that she will schedule for the pft   She states that she was supposed to go to the cardiologist for her cardiac arrhythmia but was sick and will go later. She states that she is currently using trelegy every day and her breathing has improved and she is not needing the albuterol inhaler, has not used it for weeks.   Mood is better  Yet to see the gynecologist but does not want to any more   Scheduled to do a dexa scan  She admits to cough, arthralgias, occasional nausea when she takes her medications but denies any fever, chills, night sweats, headache, dizziness, nasal congestion, runny nose, pnd, sore throat, ear ache, chest pain, sob, palpitations, vomiting, diarrhea, constipation, hematochezia, hematuria, melena stools, feelings of anxiety,depression or insomnia. The following portions of the patient's history were reviewed and updated as appropriate: She  has a past medical history of Asthma, Depression, History of transfusion, Hypertension, and PONV (postoperative nausea and vomiting). She   Patient Active Problem List    Diagnosis Date Noted   • Acute exacerbation of chronic obstructive pulmonary disease (COPD) ([address]) 09/12/2023   • Arrhythmia 08/21/2023   • Tachycardia 08/21/2023   • Mild intermittent asthma with acute exacerbation 08/21/2023   • Chronic cough 07/17/2023   • Reactive airway disease without complication 96/69/9049   • Influenza vaccination declined by patient 12/09/2021   • Nail abnormalities 09/09/2021   • Reactive depression 09/09/2021   • Medication side effect, initial encounter 09/09/2021   • Dyspnea on exertion 06/02/2021   • Hyponatremia 06/02/2021   • Other hyperlipidemia 05/05/2021   • Prediabetes 05/05/2021   • Marijuana use 05/05/2021   • Aftercare following surgery 10/08/2020   • Tobacco abuse 08/31/2020   • Essential hypertension 08/27/2020   • Closed fracture of olecranon process of right ulna 08/27/2020   • Pain and swelling of upper extremity, right 08/26/2020   • Bruising of multiple areas 08/26/2020   • Right elbow pain 08/26/2020   • Breast cancer screening 08/26/2020   • Encounter for screening for lung cancer 08/26/2020     She  has a past surgical history that includes Bunionectomy; Tubal ligation; ELBOW FRACTURE REPAIR (Right, 08/31/2020); and Fracture surgery.   Her family history includes Hypertension in her mother; Kidney cancer in her father; Lung cancer (age of onset: 52) in her father; Melanoma (age of onset: 25) in her sister; No Known Problems in her brother, maternal aunt, maternal aunt, maternal aunt, maternal aunt, maternal aunt, maternal aunt, maternal aunt, maternal aunt, maternal grandfather, maternal grandmother, paternal aunt, paternal aunt, paternal grandfather, and paternal grandmother; Parkinsonism in her mother. She  reports that she quit smoking about 23 years ago. Her smoking use included cigarettes. She started smoking about 43 years ago. She has a 30.00 pack-year smoking history. She has never used smokeless tobacco. She reports current alcohol use of about 7.0 standard drinks of alcohol per week. She reports that she does not currently use drugs after having used the following drugs: Marijuana. Frequency: 7.00 times per week. Current Outpatient Medications   Medication Sig Dispense Refill   • albuterol (PROVENTIL HFA,VENTOLIN HFA) 90 mcg/act inhaler Inhale 2 puffs every 6 (six) hours as needed for wheezing or shortness of breath 8 g 5   • amLODIPine (NORVASC) 5 mg tablet Take 1 tablet (5 mg total) by mouth daily No child proof caps 90 tablet 1   • clobetasol (TEMOVATE) 0.05 % cream as needed     • fluticasone-umeclidinium-vilanterol 200-62.5-25 mcg/actuation AEPB inhaler Inhale 1 puff daily Rinse mouth after use. 1 each 5   • losartan (Cozaar) 100 MG tablet Take 1 tablet (100 mg total) by mouth daily 90 tablet 1   • multivitamin (THERAGRAN) TABS Take 1 tablet by mouth daily     • venlafaxine (EFFEXOR-XR) 75 mg 24 hr capsule Take 1 capsule (75 mg total) by mouth daily with breakfast 90 capsule 1     No current facility-administered medications for this visit.      Current Outpatient Medications on File Prior to Visit   Medication Sig   • albuterol (PROVENTIL HFA,VENTOLIN HFA) 90 mcg/act inhaler Inhale 2 puffs every 6 (six) hours as needed for wheezing or shortness of breath   • amLODIPine (NORVASC) 5 mg tablet Take 1 tablet (5 mg total) by mouth daily No child proof caps   • clobetasol (TEMOVATE) 0.05 % cream as needed   • fluticasone-umeclidinium-vilanterol 200-62.5-25 mcg/actuation AEPB inhaler Inhale 1 puff daily Rinse mouth after use. • losartan (Cozaar) 100 MG tablet Take 1 tablet (100 mg total) by mouth daily   • multivitamin (THERAGRAN) TABS Take 1 tablet by mouth daily   • venlafaxine (EFFEXOR-XR) 75 mg 24 hr capsule Take 1 capsule (75 mg total) by mouth daily with breakfast     No current facility-administered medications on file prior to visit. She has No Known Allergies. .    Review of Systems   Constitutional:  Negative for activity change, chills, fatigue, fever and unexpected weight change. HENT:  Negative for ear pain, postnasal drip, rhinorrhea, sinus pressure and sore throat. Eyes:  Negative for pain. Respiratory:  Positive for cough (am cough, much less phlegm, greenish clear color). Negative for choking, chest tightness, shortness of breath and wheezing. Cardiovascular:  Negative for chest pain, palpitations and leg swelling. Gastrointestinal:  Positive for nausea (occasionally in the am with pills). Negative for abdominal pain, constipation, diarrhea and vomiting. Genitourinary:  Negative for dysuria and hematuria. Musculoskeletal:  Positive for arthralgias. Negative for back pain, gait problem, joint swelling, myalgias and neck stiffness. Skin:  Negative for pallor and rash. Neurological:  Negative for dizziness, tremors, seizures, syncope, light-headedness and headaches. Hematological:  Negative for adenopathy. Psychiatric/Behavioral:  Negative for behavioral problems.           Objective:      /72 (BP Location: Left arm, Patient Position: Sitting, Cuff Size: Standard)   Pulse 80   Temp 98.6 °F (37 °C) (Temporal)   Ht 5' 6.5" (1.689 m)   Wt 62.6 kg (138 lb)   SpO2 96%   BMI 21.94 kg/m²          Physical Exam  Constitutional:       General: She is not in acute distress. Appearance: She is well-developed. She is not diaphoretic. HENT:      Head: Normocephalic and atraumatic. Right Ear: External ear normal.      Left Ear: External ear normal.      Nose: Nose normal.      Mouth/Throat:      Mouth: Mucous membranes are dry. Pharynx: Posterior oropharyngeal erythema present. No oropharyngeal exudate. Eyes:      General: No scleral icterus. Right eye: No discharge. Left eye: No discharge. Conjunctiva/sclera: Conjunctivae normal.      Pupils: Pupils are equal, round, and reactive to light. Neck:      Thyroid: No thyromegaly. Vascular: No JVD. Trachea: No tracheal deviation. Cardiovascular:      Rate and Rhythm: Normal rate and regular rhythm. Heart sounds: Normal heart sounds. No murmur heard. No friction rub. No gallop. Pulmonary:      Effort: Pulmonary effort is normal. Prolonged expiration present. No respiratory distress. Breath sounds: Decreased breath sounds present. No wheezing or rales. Chest:      Chest wall: No tenderness. Abdominal:      General: Bowel sounds are normal. There is no distension. Palpations: Abdomen is soft. There is no mass. Tenderness: There is no abdominal tenderness. There is no guarding or rebound. Musculoskeletal:         General: No tenderness or deformity. Normal range of motion. Cervical back: Normal range of motion and neck supple. Lymphadenopathy:      Cervical: No cervical adenopathy. Skin:     General: Skin is warm and dry. Coloration: Skin is not pale. Findings: No erythema or rash. Neurological:      Mental Status: She is alert and oriented to person, place, and time. Cranial Nerves: No cranial nerve deficit. Motor: No abnormal muscle tone. Coordination: Coordination normal.      Deep Tendon Reflexes: Reflexes are normal and symmetric.    Psychiatric:         Behavior: Behavior normal.           Office Visit on 09/12/2023   Component Date Value Ref Range Status   • POCT SARS-CoV-2 Ag 09/12/2023 Negative  Negative Final   • VALID CONTROL 09/12/2023 Valid   Final   Appointment on 08/23/2023   Component Date Value Ref Range Status   • Hemoglobin A1C 08/23/2023 6.4 (H)  Normal 4.0-5.6%; PreDiabetic 5.7-6.4%;  Diabetic >=6.5%; Glycemic control for adults with diabetes <7.0% % Final   • EAG 08/23/2023 137  mg/dl Final   • WBC 08/23/2023 7.98  4.31 - 10.16 Thousand/uL Final   • RBC 08/23/2023 5.69 (H)  3.81 - 5.12 Million/uL Final   • Hemoglobin 08/23/2023 17.7 (H)  11.5 - 15.4 g/dL Final   • Hematocrit 08/23/2023 52.1 (H)  34.8 - 46.1 % Final   • MCV 08/23/2023 92  82 - 98 fL Final   • MCH 08/23/2023 31.1  26.8 - 34.3 pg Final   • MCHC 08/23/2023 34.0  31.4 - 37.4 g/dL Final   • RDW 08/23/2023 12.0  11.6 - 15.1 % Final   • MPV 08/23/2023 10.6  8.9 - 12.7 fL Final   • Platelets 26/89/2839 284  149 - 390 Thousands/uL Final   • nRBC 08/23/2023 0  /100 WBCs Final   • Neutrophils Relative 08/23/2023 55  43 - 75 % Final   • Immat GRANS % 08/23/2023 0  0 - 2 % Final   • Lymphocytes Relative 08/23/2023 34  14 - 44 % Final   • Monocytes Relative 08/23/2023 9  4 - 12 % Final   • Eosinophils Relative 08/23/2023 1  0 - 6 % Final   • Basophils Relative 08/23/2023 1  0 - 1 % Final   • Neutrophils Absolute 08/23/2023 4.44  1.85 - 7.62 Thousands/µL Final   • Immature Grans Absolute 08/23/2023 0.02  0.00 - 0.20 Thousand/uL Final   • Lymphocytes Absolute 08/23/2023 2.67  0.60 - 4.47 Thousands/µL Final   • Monocytes Absolute 08/23/2023 0.73  0.17 - 1.22 Thousand/µL Final   • Eosinophils Absolute 08/23/2023 0.04  0.00 - 0.61 Thousand/µL Final   • Basophils Absolute 08/23/2023 0.08  0.00 - 0.10 Thousands/µL Final   • Cholesterol 08/23/2023 213 (H)  See Comment mg/dL Final    Cholesterol:         Pediatric <18 Years        Desirable          <170 mg/dL      Borderline High    170-199 mg/dL      High               >=200 mg/dL        Adult >=18 Years Desirable         <200 mg/dL      Borderline High   200-239 mg/dL      High              >239 mg/dL     • Triglycerides 08/23/2023 86  See Comment mg/dL Final    Triglyceride:     0-9Y            <75mg/dL     10Y-17Y         <90 mg/dL       >=18Y     Normal          <150 mg/dL     Borderline High 150-199 mg/dL     High            200-499 mg/dL        Very High       >499 mg/dL    Specimen collection should occur prior to Metamizole administration due to the potential for falsely depressed results. • HDL, Direct 08/23/2023 60  >=50 mg/dL Final   • LDL Calculated 08/23/2023 136 (H)  0 - 100 mg/dL Final    LDL Cholesterol:     Optimal           <100 mg/dl     Near Optimal      100-129 mg/dl     Above Optimal       Borderline High 130-159 mg/dl       High            160-189 mg/dl       Very High       >189 mg/dl         This screening LDL is a calculated result. It does not have the accuracy of the Direct Measured LDL in the monitoring of patients with hyperlipidemia and/or statin therapy. Direct Measure LDL (FLT269) must be ordered separately in these patients. • Non-HDL-Chol (CHOL-HDL) 08/23/2023 153  mg/dl Final   • TSH 3RD GENERATON 08/23/2023 0.540  0.450 - 4.500 uIU/mL Final    The recommended reference ranges for TSH during pregnancy are as follows:   First trimester 0.1 to 2.5 uIU/mL   Second trimester  0.2 to 3.0 uIU/mL   Third trimester 0.3 to 3.0 uIU/m    Note: Normal ranges may not apply to patients who are transgender, non-binary, or whose legal sex, sex at birth, and gender identity differ. Adult TSH (3rd generation) reference range follows the recommended guidelines of the [employer], January, 2020.    • Sodium 08/23/2023 136  135 - 147 mmol/L Final   • Potassium 08/23/2023 4.3  3.5 - 5.3 mmol/L Final   • Chloride 08/23/2023 96  96 - 108 mmol/L Final   • CO2 08/23/2023 29  21 - 32 mmol/L Final   • ANION GAP 08/23/2023 11  mmol/L Final   • BUN 08/23/2023 10  5 - 25 mg/dL Final   • Creatinine 08/23/2023 0.83  0.60 - 1.30 mg/dL Final    Standardized to IDMS reference method   • Glucose, Fasting 08/23/2023 126 (H)  65 - 99 mg/dL Final   • Calcium 08/23/2023 10.0  8.4 - 10.2 mg/dL Final   • AST 08/23/2023 18  13 - 39 U/L Final   • ALT 08/23/2023 20  7 - 52 U/L Final    Specimen collection should occur prior to Sulfasalazine administration due to the potential for falsely depressed results. • Alkaline Phosphatase 08/23/2023 92  34 - 104 U/L Final   • Total Protein 08/23/2023 7.6  6.4 - 8.4 g/dL Final   • Albumin 08/23/2023 4.6  3.5 - 5.0 g/dL Final   • Total Bilirubin 08/23/2023 1.03 (H)  0.20 - 1.00 mg/dL Final    Use of this assay is not recommended for patients undergoing treatment with eltrombopag due to the potential for falsely elevated results. N-acetyl-p-benzoquinone imine (metabolite of Acetaminophen) will generate erroneously low results in samples for patients that have taken an overdose of Acetaminophen.    • eGFR 08/23/2023 74  ml/min/1.73sq m Final   • Magnesium 08/23/2023 1.9  1.9 - 2.7 mg/dL Final

## 2023-12-07 ENCOUNTER — HOSPITAL ENCOUNTER (OUTPATIENT)
Dept: RADIOLOGY | Facility: IMAGING CENTER | Age: 68
End: 2023-12-07
Payer: MEDICARE

## 2023-12-07 VITALS — BODY MASS INDEX: 21.66 KG/M2 | WEIGHT: 138.01 LBS | HEIGHT: 67 IN

## 2023-12-07 DIAGNOSIS — Z12.31 ENCOUNTER FOR SCREENING MAMMOGRAM FOR MALIGNANT NEOPLASM OF BREAST: ICD-10-CM

## 2023-12-07 DIAGNOSIS — Z13.820 OSTEOPOROSIS SCREENING: ICD-10-CM

## 2023-12-07 PROCEDURE — 77067 SCR MAMMO BI INCL CAD: CPT

## 2023-12-07 PROCEDURE — 77080 DXA BONE DENSITY AXIAL: CPT

## 2023-12-07 PROCEDURE — 77063 BREAST TOMOSYNTHESIS BI: CPT

## 2024-01-03 ENCOUNTER — OFFICE VISIT (OUTPATIENT)
Dept: CARDIOLOGY CLINIC | Facility: CLINIC | Age: 69
End: 2024-01-03
Payer: MEDICARE

## 2024-01-03 VITALS
HEART RATE: 109 BPM | SYSTOLIC BLOOD PRESSURE: 152 MMHG | WEIGHT: 153 LBS | OXYGEN SATURATION: 96 % | DIASTOLIC BLOOD PRESSURE: 88 MMHG | BODY MASS INDEX: 24.32 KG/M2

## 2024-01-03 DIAGNOSIS — E78.2 MIXED HYPERLIPIDEMIA: Primary | ICD-10-CM

## 2024-01-03 DIAGNOSIS — I10 ESSENTIAL HYPERTENSION: ICD-10-CM

## 2024-01-03 DIAGNOSIS — R00.0 TACHYCARDIA: ICD-10-CM

## 2024-01-03 DIAGNOSIS — R73.03 PREDIABETES: ICD-10-CM

## 2024-01-03 DIAGNOSIS — J44.1 ACUTE EXACERBATION OF CHRONIC OBSTRUCTIVE PULMONARY DISEASE (COPD) (HCC): ICD-10-CM

## 2024-01-03 DIAGNOSIS — I49.8 OTHER CARDIAC ARRHYTHMIA: ICD-10-CM

## 2024-01-03 PROCEDURE — 99204 OFFICE O/P NEW MOD 45 MIN: CPT | Performed by: INTERNAL MEDICINE

## 2024-01-03 RX ORDER — ROSUVASTATIN CALCIUM 10 MG/1
10 TABLET, COATED ORAL DAILY
Qty: 30 TABLET | Refills: 3 | Status: SHIPPED | OUTPATIENT
Start: 2024-01-03

## 2024-01-03 NOTE — PROGRESS NOTES
Cardiology   Kenzie Al 68 y.o. female MRN: [id number]   Encounter: [id number]        Reason for Consult / Principal Problem: Tachycardia, dyslipidemia    Physician Requesting Consult: Maura Mcintosh DO    PCP: Maura Mcintosh DO        Assessment/Plan:    >> Dyslipidemia  >> Hypertension  >> Tachycardia: Possibly secondary to COPD, versus medication related: Venlafaxine.  >> COPD    Plan:  -Start Crestor 10 mg daily  -Check echocardiogram to evaluate for hypertensive heart disease/other structural heart disease  -Check Holter monitor to evaluate for arrhythmia  -Continue amlodipine 5 mg and losartan 100 mg  -COPD treatment per primary/pulmonary.  -Repeat lipid profile in 3 months.  -Return to office in 3 months.      CC: Tachycardia, dyslipidemia, hypertension    HPI  68 y.o. female presents to establish care with cardiology and for the above.  She has a long-term history of smoking.  Has been diagnosed with COPD.    She has noted her heart rate to be high on previous office visits.  Her hemoglobin is also elevated at 17.    Her last LDL from August 23, 2023 was 136.    Last A1c 6.4, hemoglobin is 17.7        The patient denies chest pain, shortness of breath, palpitations, orthopnea, PND, pedal edema, syncope, presyncope, diaphoresis, nausea/vomiting       The 10-year ASCVD risk score (Albert DK, et al., 2019) is: 11.5%    Values used to calculate the score:      Age: 68 years      Sex: Female      Is Non- : No      Diabetic: No      Tobacco smoker: No      Systolic Blood Pressure: 152 mmHg      Is BP treated: Yes      HDL Cholesterol: 60 mg/dL      Total Cholesterol: 213 mg/dL            Physical exam  Objective   Vitals: Blood pressure 152/88, pulse (!) 109, weight 69.4 kg (153 lb), SpO2 96%.    General:  AO x3, no acute distress  Cardiac:  S1-S2 normal,  No murmurs. No rubs or gallops, JVP: normal  Lungs:  Clear to auscultation bilaterally, no wheezing or crackles.  Abdomen:   Soft, nontender, nondistended.  Extremities:  Warm, well perfused, pulses palpable, no ulcers or rashes. Edema:absent  Neuro: Grossly nonfocal        ======================================================  TREADMILL STRESS  No results found for this or any previous visit.     ----------------------------------------------------------------------------------------------  NUCLEAR STRESS TEST: No results found for this or any previous visit.    No results found for this or any previous visit.      --------------------------------------------------------------------------------  CATH:  No results found for this or any previous visit.    --------------------------------------------------------------------------------  ECHO:   No results found for this or any previous visit.    No results found for this or any previous visit.    --------------------------------------------------------------------------------  HOLTER  No results found for this or any previous visit.    --------------------------------------------------------------------------------  CAROTIDS  No results found for this or any previous visit.       Diagnoses and all orders for this visit:    Other cardiac arrhythmia  -     Ambulatory Referral to Cardiology    Tachycardia  -     Ambulatory Referral to Cardiology  -     POCT ECG       ======================================================          Review of Systems  ROS as noted above, otherwise 12 point review of systems was performed and is negative.     Historical Information   Past Medical History:   Diagnosis Date    Asthma     Depression     History of transfusion     Hypertension     PONV (postoperative nausea and vomiting)      Past Surgical History:   Procedure Laterality Date    BUNIONECTOMY      ELBOW FRACTURE REPAIR Right 08/31/2020    Procedure: OPEN REDUCTION W/ INTERNAL FIXATION (ORIF) ELBOW;  Surgeon: Song Marmolejo MD;  Location: [facility];  Service: Orthopedics    FRACTURE SURGERY       "TUBAL LIGATION       Social History     Substance and Sexual Activity   Alcohol Use Yes    Alcohol/week: 7.0 standard drinks of alcohol    Types: 7 Standard drinks or equivalent per week    Comment: daily      Social History     Substance and Sexual Activity   Drug Use Not Currently    Frequency: 7.0 times per week    Types: Marijuana     Social History     Tobacco Use   Smoking Status Former    Current packs/day: 0.00    Average packs/day: 1.5 packs/day for 20.0 years (30.0 ttl pk-yrs)    Types: Cigarettes    Start date: 1980    Quit date: 2000    Years since quittin.0   Smokeless Tobacco Never     Family History   Problem Relation Age of Onset    Parkinsonism Mother     Hypertension Mother     Lung cancer Father 49    Kidney cancer Father     Melanoma Sister 18        upper right thigh    No Known Problems Maternal Grandmother     No Known Problems Maternal Grandfather     No Known Problems Paternal Grandmother     No Known Problems Paternal Grandfather     No Known Problems Brother     No Known Problems Maternal Aunt     No Known Problems Maternal Aunt     No Known Problems Maternal Aunt     No Known Problems Maternal Aunt     No Known Problems Maternal Aunt     No Known Problems Maternal Aunt     No Known Problems Maternal Aunt     No Known Problems Maternal Aunt     No Known Problems Paternal Aunt     No Known Problems Paternal Aunt        Meds/Allergies   Hospital Medications:   No current facility-administered medications for this visit.     Home Medications: (Not in a hospital admission)      No Known Allergies      Portions of the record may have been created with voice recognition software.  Occasional wrong words or \"sound a like\" substitutions may have occurred due to the inherent limitations of voice recognition software.  Read the chart carefully and recognize, using context, where substitutions have occurred.        I have spent a total of 50 min in reviewing and/or ordering tests, " medications, or procedures, performing an examination or evaluation, reviewing pertinent history, counseling and educating the patient, referring and/or communicating with other health care professionals, documenting in the EMR and general coordination of care of the patient today.

## 2024-01-03 NOTE — LETTER
January 7, 2024     Maura Mcintosh DO  [address]  [address]  [facility] [address]    Patient: Kenzie Al   YOB: 1955   Date of Visit: 1/3/2024       Dear Dr. Mcintosh:    Thank you for referring Kenzie Al to me for evaluation. Below are my notes for this consultation.    If you have questions, please do not hesitate to call me. I look forward to following your patient along with you.         Sincerely,        Maurice Fernández MD        CC: No Recipients    Maurice Fernández MD  1/7/2024 11:02 PM  Sign when Signing Visit  Cardiology   Kenzie Al 68 y.o. female MRN: [id number]   Encounter: [id number]        Reason for Consult / Principal Problem: Tachycardia, dyslipidemia    Physician Requesting Consult: Maura Mcintosh DO    PCP: Maura Mcintosh DO        Assessment/Plan:    >> Dyslipidemia  >> Hypertension  >> Tachycardia: Possibly secondary to COPD, versus medication related: Venlafaxine.  >> COPD    Plan:  -Start Crestor 10 mg daily  -Check echocardiogram to evaluate for hypertensive heart disease/other structural heart disease  -Check Holter monitor to evaluate for arrhythmia  -Continue amlodipine 5 mg and losartan 100 mg  -COPD treatment per primary/pulmonary.  -Repeat lipid profile in 3 months.  -Return to office in 3 months.      CC: Tachycardia, dyslipidemia, hypertension    HPI  68 y.o. female presents to establish care with cardiology and for the above.  She has a long-term history of smoking.  Has been diagnosed with COPD.    She has noted her heart rate to be high on previous office visits.  Her hemoglobin is also elevated at 17.    Her last LDL from August 23, 2023 was 136.    Last A1c 6.4, hemoglobin is 17.7        The patient denies chest pain, shortness of breath, palpitations, orthopnea, PND, pedal edema, syncope, presyncope, diaphoresis, nausea/vomiting       The 10-year ASCVD risk score (Albert CHAVIRA, et al., 2019) is: 11.5%    Values used to calculate  the score:      Age: 68 years      Sex: Female      Is Non- : No      Diabetic: No      Tobacco smoker: No      Systolic Blood Pressure: 152 mmHg      Is BP treated: Yes      HDL Cholesterol: 60 mg/dL      Total Cholesterol: 213 mg/dL            Physical exam  Objective  Vitals: Blood pressure 152/88, pulse (!) 109, weight 69.4 kg (153 lb), SpO2 96%.    General:  AO x3, no acute distress  Cardiac:  S1-S2 normal,  No murmurs. No rubs or gallops, JVP: normal  Lungs:  Clear to auscultation bilaterally, no wheezing or crackles.  Abdomen:  Soft, nontender, nondistended.  Extremities:  Warm, well perfused, pulses palpable, no ulcers or rashes. Edema:absent  Neuro: Grossly nonfocal        ======================================================  TREADMILL STRESS  No results found for this or any previous visit.     ----------------------------------------------------------------------------------------------  NUCLEAR STRESS TEST: No results found for this or any previous visit.    No results found for this or any previous visit.      --------------------------------------------------------------------------------  CATH:  No results found for this or any previous visit.    --------------------------------------------------------------------------------  ECHO:   No results found for this or any previous visit.    No results found for this or any previous visit.    --------------------------------------------------------------------------------  HOLTER  No results found for this or any previous visit.    --------------------------------------------------------------------------------  CAROTIDS  No results found for this or any previous visit.       Diagnoses and all orders for this visit:    Other cardiac arrhythmia  -     Ambulatory Referral to Cardiology    Tachycardia  -     Ambulatory Referral to Cardiology  -     POCT ECG       ======================================================          Review  of Systems  ROS as noted above, otherwise 12 point review of systems was performed and is negative.     Historical Information  Past Medical History:   Diagnosis Date   • Asthma    • Depression    • History of transfusion    • Hypertension    • PONV (postoperative nausea and vomiting)      Past Surgical History:   Procedure Laterality Date   • BUNIONECTOMY     • ELBOW FRACTURE REPAIR Right 2020    Procedure: OPEN REDUCTION W/ INTERNAL FIXATION (ORIF) ELBOW;  Surgeon: Song Marmolejo MD;  Location: [facility];  Service: Orthopedics   • FRACTURE SURGERY     • TUBAL LIGATION       Social History     Substance and Sexual Activity   Alcohol Use Yes   • Alcohol/week: 7.0 standard drinks of alcohol   • Types: 7 Standard drinks or equivalent per week    Comment: daily      Social History     Substance and Sexual Activity   Drug Use Not Currently   • Frequency: 7.0 times per week   • Types: Marijuana     Social History     Tobacco Use   Smoking Status Former   • Current packs/day: 0.00   • Average packs/day: 1.5 packs/day for 20.0 years (30.0 ttl pk-yrs)   • Types: Cigarettes   • Start date: 1980   • Quit date: 2000   • Years since quittin.0   Smokeless Tobacco Never     Family History   Problem Relation Age of Onset   • Parkinsonism Mother    • Hypertension Mother    • Lung cancer Father 49   • Kidney cancer Father    • Melanoma Sister 18        upper right thigh   • No Known Problems Maternal Grandmother    • No Known Problems Maternal Grandfather    • No Known Problems Paternal Grandmother    • No Known Problems Paternal Grandfather    • No Known Problems Brother    • No Known Problems Maternal Aunt    • No Known Problems Maternal Aunt    • No Known Problems Maternal Aunt    • No Known Problems Maternal Aunt    • No Known Problems Maternal Aunt    • No Known Problems Maternal Aunt    • No Known Problems Maternal Aunt    • No Known Problems Maternal Aunt    • No Known Problems Paternal Aunt    • No Known  "Problems Paternal Aunt        Meds/Allergies  Hospital Medications:   No current facility-administered medications for this visit.     Home Medications: (Not in a hospital admission)      No Known Allergies      Portions of the record may have been created with voice recognition software.  Occasional wrong words or \"sound a like\" substitutions may have occurred due to the inherent limitations of voice recognition software.  Read the chart carefully and recognize, using context, where substitutions have occurred.        I have spent a total of 50 min in reviewing and/or ordering tests, medications, or procedures, performing an examination or evaluation, reviewing pertinent history, counseling and educating the patient, referring and/or communicating with other health care professionals, documenting in the EMR and general coordination of care of the patient today.               "

## 2024-01-04 DIAGNOSIS — I10 ESSENTIAL HYPERTENSION: ICD-10-CM

## 2024-01-04 DIAGNOSIS — F32.9 REACTIVE DEPRESSION: ICD-10-CM

## 2024-01-05 RX ORDER — LOSARTAN POTASSIUM 100 MG/1
100 TABLET ORAL DAILY
Qty: 90 TABLET | Refills: 1 | Status: SHIPPED | OUTPATIENT
Start: 2024-01-05

## 2024-01-05 RX ORDER — AMLODIPINE BESYLATE 5 MG/1
5 TABLET ORAL DAILY
Qty: 90 TABLET | Refills: 1 | Status: SHIPPED | OUTPATIENT
Start: 2024-01-05

## 2024-01-05 RX ORDER — VENLAFAXINE HYDROCHLORIDE 75 MG/1
75 CAPSULE, EXTENDED RELEASE ORAL
Qty: 90 CAPSULE | Refills: 1 | Status: SHIPPED | OUTPATIENT
Start: 2024-01-05

## 2024-01-07 PROCEDURE — 93000 ELECTROCARDIOGRAM COMPLETE: CPT | Performed by: INTERNAL MEDICINE

## 2024-01-16 ENCOUNTER — HOSPITAL ENCOUNTER (OUTPATIENT)
Dept: NON INVASIVE DIAGNOSTICS | Facility: CLINIC | Age: 69
Discharge: HOME | End: 2024-01-16
Payer: MEDICARE

## 2024-01-16 VITALS
HEART RATE: 80 BPM | BODY MASS INDEX: 24.01 KG/M2 | DIASTOLIC BLOOD PRESSURE: 88 MMHG | SYSTOLIC BLOOD PRESSURE: 152 MMHG | WEIGHT: 153 LBS | HEIGHT: 67 IN

## 2024-01-16 DIAGNOSIS — I49.8 OTHER CARDIAC ARRHYTHMIA: ICD-10-CM

## 2024-01-16 DIAGNOSIS — R00.0 TACHYCARDIA: ICD-10-CM

## 2024-01-16 LAB
AORTIC ROOT: 3.1 CM
AORTIC VALVE MEAN VELOCITY: 9.9 M/S
APICAL FOUR CHAMBER EJECTION FRACTION: 70 %
ASCENDING AORTA: 2.9 CM
AV AREA BY CONTINUOUS VTI: 1.8 CM2
AV AREA PEAK VELOCITY: 1.8 CM2
AV LVOT MEAN GRADIENT: 3 MMHG
AV LVOT PEAK GRADIENT: 5 MMHG
AV MEAN GRADIENT: 5 MMHG
AV PEAK GRADIENT: 8 MMHG
AV VALVE AREA: 1.77 CM2
AV VELOCITY RATIO: 0.79
BSA FOR ECHO PROCEDURE: 1.79 M2
DOP CALC AO PEAK VEL: 1.45 M/S
DOP CALC AO VTI: 31.03 CM
DOP CALC LVOT AREA: 2.27 CM2
DOP CALC LVOT CARDIAC INDEX: 2.44 L/MIN/M2
DOP CALC LVOT CARDIAC OUTPUT: 4.37 L/MIN
DOP CALC LVOT DIAMETER: 1.7 CM
DOP CALC LVOT PEAK VEL VTI: 24.26 CM
DOP CALC LVOT PEAK VEL: 1.14 M/S
DOP CALC LVOT STROKE INDEX: 30.2 ML/M2
DOP CALC LVOT STROKE VOLUME: 55.04
E WAVE DECELERATION TIME: 224 MS
E/A RATIO: 0.73
FRACTIONAL SHORTENING: 26 (ref 28–44)
GLOBAL LONGITUIDAL STRAIN: -18 %
INTERVENTRICULAR SEPTUM IN DIASTOLE (PARASTERNAL SHORT AXIS VIEW): 0.8 CM
INTERVENTRICULAR SEPTUM: 0.8 CM (ref 0.6–1.1)
LAAS-AP2: 16.4 CM2
LAAS-AP4: 11.5 CM2
LEFT ATRIUM SIZE: 3.4 CM
LEFT ATRIUM VOLUME (MOD BIPLANE): 34 ML
LEFT ATRIUM VOLUME INDEX (MOD BIPLANE): 19 ML/M2
LEFT INTERNAL DIMENSION IN SYSTOLE: 3.1 CM (ref 2.1–4)
LEFT VENTRICULAR INTERNAL DIMENSION IN DIASTOLE: 4.2 CM (ref 3.5–6)
LEFT VENTRICULAR POSTERIOR WALL IN END DIASTOLE: 0.8 CM
LEFT VENTRICULAR STROKE VOLUME: 37 ML
LVSV (TEICH): 37 ML
MV E'TISSUE VEL-LAT: 7 CM/S
MV E'TISSUE VEL-SEP: 7 CM/S
MV PEAK A VEL: 1.1 M/S
MV PEAK E VEL: 80 CM/S
RA PRESSURE ESTIMATED: 3 MMHG
RIGHT ATRIUM AREA SYSTOLE A4C: 13.5 CM2
RIGHT VENTRICLE ID DIMENSION: 3.5 CM
SL CV LEFT ATRIUM LENGTH A2C: 5.3 CM
SL CV LV EF: 70
SL CV PED ECHO LEFT VENTRICLE DIASTOLIC VOLUME (MOD BIPLANE) 2D: 77 ML
SL CV PED ECHO LEFT VENTRICLE SYSTOLIC VOLUME (MOD BIPLANE) 2D: 39 ML
TRICUSPID ANNULAR PLANE SYSTOLIC EXCURSION: 2.3 CM

## 2024-01-16 PROCEDURE — 93306 TTE W/DOPPLER COMPLETE: CPT

## 2024-01-16 PROCEDURE — 93306 TTE W/DOPPLER COMPLETE: CPT | Performed by: INTERNAL MEDICINE

## 2024-01-17 ENCOUNTER — TELEPHONE (OUTPATIENT)
Dept: CARDIOLOGY CLINIC | Facility: CLINIC | Age: 69
End: 2024-01-17

## 2024-01-17 NOTE — TELEPHONE ENCOUNTER
----- Message from Maurice Fernández MD sent at 1/16/2024  4:16 PM EST -----  Normal study  ----- Message -----  From: Maurice Fernández MD  Sent: 1/16/2024   4:02 PM EST  To: Maurice Fernández MD

## 2024-01-26 ENCOUNTER — OFFICE VISIT (OUTPATIENT)
Dept: INTERNAL MEDICINE CLINIC | Age: 69
End: 2024-01-26
Payer: MEDICARE

## 2024-01-26 VITALS
OXYGEN SATURATION: 96 % | HEART RATE: 92 BPM | WEIGHT: 155 LBS | BODY MASS INDEX: 24.33 KG/M2 | DIASTOLIC BLOOD PRESSURE: 88 MMHG | TEMPERATURE: 97.5 F | HEIGHT: 67 IN | SYSTOLIC BLOOD PRESSURE: 128 MMHG

## 2024-01-26 DIAGNOSIS — Z12.11 SCREENING FOR COLORECTAL CANCER: ICD-10-CM

## 2024-01-26 DIAGNOSIS — Z12.31 ENCOUNTER FOR SCREENING MAMMOGRAM FOR BREAST CANCER: ICD-10-CM

## 2024-01-26 DIAGNOSIS — M85.88 OSTEOPENIA OF OTHER SITE: ICD-10-CM

## 2024-01-26 DIAGNOSIS — Z23 ENCOUNTER FOR IMMUNIZATION: ICD-10-CM

## 2024-01-26 DIAGNOSIS — Z72.0 TOBACCO ABUSE: ICD-10-CM

## 2024-01-26 DIAGNOSIS — I10 ESSENTIAL HYPERTENSION: ICD-10-CM

## 2024-01-26 DIAGNOSIS — R22.1 LOCALIZED SWELLING, MASS AND LUMP, NECK: ICD-10-CM

## 2024-01-26 DIAGNOSIS — R73.03 PREDIABETES: ICD-10-CM

## 2024-01-26 DIAGNOSIS — F32.9 REACTIVE DEPRESSION: ICD-10-CM

## 2024-01-26 DIAGNOSIS — J45.30 MILD PERSISTENT REACTIVE AIRWAY DISEASE WITHOUT COMPLICATION: ICD-10-CM

## 2024-01-26 DIAGNOSIS — F12.90 MARIJUANA USE: ICD-10-CM

## 2024-01-26 DIAGNOSIS — E78.49 OTHER HYPERLIPIDEMIA: ICD-10-CM

## 2024-01-26 DIAGNOSIS — D58.2 ELEVATED HEMOGLOBIN (HCC): ICD-10-CM

## 2024-01-26 DIAGNOSIS — Z00.00 MEDICARE ANNUAL WELLNESS VISIT, SUBSEQUENT: Primary | ICD-10-CM

## 2024-01-26 DIAGNOSIS — Z12.12 SCREENING FOR COLORECTAL CANCER: ICD-10-CM

## 2024-01-26 DIAGNOSIS — J44.1 ACUTE EXACERBATION OF CHRONIC OBSTRUCTIVE PULMONARY DISEASE (COPD) (HCC): ICD-10-CM

## 2024-01-26 PROCEDURE — 99214 OFFICE O/P EST MOD 30 MIN: CPT | Performed by: INTERNAL MEDICINE

## 2024-01-26 PROCEDURE — G0438 PPPS, INITIAL VISIT: HCPCS | Performed by: INTERNAL MEDICINE

## 2024-01-26 NOTE — PROGRESS NOTES
Assessment and Plan:     Problem List Items Addressed This Visit        Respiratory    Reactive airway disease without complication    Relevant Medications    fluticasone-umeclidinium-vilanterol 200-62.5-25 mcg/actuation AEPB inhaler    Other Relevant Orders    CBC and differential    Comprehensive metabolic panel    Hemoglobin A1C    Acute exacerbation of chronic obstructive pulmonary disease (COPD) (HCC)    Relevant Medications    fluticasone-umeclidinium-vilanterol 200-62.5-25 mcg/actuation AEPB inhaler    Other Relevant Orders    CBC and differential    Comprehensive metabolic panel    Hemoglobin A1C       Cardiovascular and Mediastinum    Essential hypertension    Relevant Orders    CBC and differential    Comprehensive metabolic panel    Hemoglobin A1C       Other    Other hyperlipidemia    Relevant Orders    CBC and differential    Comprehensive metabolic panel    Hemoglobin A1C    Lipid panel    Prediabetes    Relevant Orders    CBC and differential    Comprehensive metabolic panel    Hemoglobin A1C    Reactive depression    Relevant Orders    CBC and differential    Comprehensive metabolic panel    Hemoglobin A1C    Elevated hemoglobin (HCC)    RESOLVED: Tobacco abuse    Relevant Orders    CBC and differential    Comprehensive metabolic panel    Hemoglobin A1C    RESOLVED: Marijuana use    Relevant Orders    CBC and differential    Comprehensive metabolic panel    Hemoglobin A1C   Other Visit Diagnoses     Medicare annual wellness visit, subsequent    -  Primary    Relevant Orders    CBC and differential    Comprehensive metabolic panel    Hemoglobin A1C    Encounter for screening mammogram for breast cancer        last mammogram was in Dec 2023 and she will be due in Dec 2024    Relevant Orders    CBC and differential    Comprehensive metabolic panel    Hemoglobin A1C    Screening for colorectal cancer        last cologuard test was in 2022 and was neg, she will be due next year    Relevant Orders    CBC  and differential    Comprehensive metabolic panel    Hemoglobin A1C    Osteopenia of other site        last dexa scan was last year and showed osteopenia and she takes a multivitamin with ca and vit d    Relevant Orders    CBC and differential    Comprehensive metabolic panel    Hemoglobin A1C    Encounter for immunization        she is up to date with 2 covid shots, does not take the flu shot, tdap, pneumococcal vaccine and she is due for the pneumococcal 20 vaccine but she declines    Relevant Medications    fluticasone-umeclidinium-vilanterol 200-62.5-25 mcg/actuation AEPB inhaler    Other Relevant Orders    CBC and differential    Comprehensive metabolic panel    Hemoglobin A1C    Localized swelling, mass and lump, neck        Relevant Orders    US head neck soft tissue          Depression Screening and Follow-up Plan: Patient was screened for depression during today's encounter. They screened negative with a PHQ-9 score of 1.    Urinary Incontinence Plan of Care: counseling topics discussed: limit alcohol, caffeine, spicy foods, and acidic foods and preventing constipation.       Preventive health issues were discussed with patient, and age appropriate screening tests were ordered as noted in patient's After Visit Summary.  Personalized health advice and appropriate referrals for health education or preventive services given if needed, as noted in patient's After Visit Summary.     History of Present Illness:     Patient presents for a Medicare Wellness Visit    HPI   Patient Care Team:  Maura Mcintosh DO as PCP - General (Internal Medicine)     Review of Systems:     Review of Systems     Problem List:     Patient Active Problem List   Diagnosis   • Pain and swelling of upper extremity, right   • Bruising of multiple areas   • Right elbow pain   • Breast cancer screening   • Encounter for screening for lung cancer   • Essential hypertension   • Closed fracture of olecranon process of right ulna   • Aftercare  following surgery   • Other hyperlipidemia   • Prediabetes   • Dyspnea on exertion   • Hyponatremia   • Nail abnormalities   • Reactive depression   • Medication side effect, initial encounter   • Influenza vaccination declined by patient   • Reactive airway disease without complication   • Chronic cough   • Arrhythmia   • Tachycardia   • Mild intermittent asthma with acute exacerbation   • Acute exacerbation of chronic obstructive pulmonary disease (COPD) (HCC)   • Elevated hemoglobin (HCC)      Past Medical and Surgical History:     Past Medical History:   Diagnosis Date   • Asthma    • Depression    • History of transfusion    • Hypertension    • PONV (postoperative nausea and vomiting)      Past Surgical History:   Procedure Laterality Date   • BUNIONECTOMY     • ELBOW FRACTURE REPAIR Right 08/31/2020    Procedure: OPEN REDUCTION W/ INTERNAL FIXATION (ORIF) ELBOW;  Surgeon: Song Marmolejo MD;  Location: [facility];  Service: Orthopedics   • FRACTURE SURGERY     • TUBAL LIGATION        Family History:     Family History   Problem Relation Age of Onset   • Parkinsonism Mother    • Hypertension Mother    • Lung cancer Father 49   • Kidney cancer Father    • Melanoma Sister 18        upper right thigh   • No Known Problems Maternal Grandmother    • No Known Problems Maternal Grandfather    • No Known Problems Paternal Grandmother    • No Known Problems Paternal Grandfather    • No Known Problems Brother    • No Known Problems Maternal Aunt    • No Known Problems Maternal Aunt    • No Known Problems Maternal Aunt    • No Known Problems Maternal Aunt    • No Known Problems Maternal Aunt    • No Known Problems Maternal Aunt    • No Known Problems Maternal Aunt    • No Known Problems Maternal Aunt    • No Known Problems Paternal Aunt    • No Known Problems Paternal Aunt       Social History:     Social History     Socioeconomic History   • Marital status:      Spouse name: None   • Number of children: None   •  Years of education: None   • Highest education level: None   Occupational History   • None   Tobacco Use   • Smoking status: Former     Current packs/day: 0.00     Average packs/day: 1.5 packs/day for 20.0 years (30.0 ttl pk-yrs)     Types: Cigarettes     Start date: 1980     Quit date: 2000     Years since quittin.0   • Smokeless tobacco: Never   Vaping Use   • Vaping status: Never Used   Substance and Sexual Activity   • Alcohol use: Yes     Alcohol/week: 7.0 standard drinks of alcohol     Types: 7 Standard drinks or equivalent per week     Comment: daily    • Drug use: Not Currently     Frequency: 7.0 times per week     Types: Marijuana   • Sexual activity: Not Currently     Birth control/protection: None   Other Topics Concern   • None   Social History Narrative   • None     Social Determinants of Health     Financial Resource Strain: Low Risk  (2024)    Overall Financial Resource Strain (CARDIA)    • Difficulty of Paying Living Expenses: Not hard at all   Food Insecurity: Not on file   Transportation Needs: No Transportation Needs (2024)    PRAPARE - Transportation    • Lack of Transportation (Medical): No    • Lack of Transportation (Non-Medical): No   Physical Activity: Not on file   Stress: Not on file   Social Connections: Not on file   Intimate Partner Violence: Not on file   Housing Stability: Not on file      Medications and Allergies:     Current Outpatient Medications   Medication Sig Dispense Refill   • albuterol (PROVENTIL HFA,VENTOLIN HFA) 90 mcg/act inhaler Inhale 2 puffs every 6 (six) hours as needed for wheezing or shortness of breath 8 g 5   • amLODIPine (NORVASC) 5 mg tablet Take 1 tablet (5 mg total) by mouth daily No child proof caps 90 tablet 1   • clobetasol (TEMOVATE) 0.05 % cream as needed     • fluticasone-umeclidinium-vilanterol 200-62.5-25 mcg/actuation AEPB inhaler Inhale 1 puff daily Rinse mouth after use. 1 each 5   • losartan (Cozaar) 100 MG tablet Take 1  tablet (100 mg total) by mouth daily 90 tablet 1   • multivitamin (THERAGRAN) TABS Take 1 tablet by mouth daily     • rosuvastatin (CRESTOR) 10 MG tablet Take 1 tablet (10 mg total) by mouth daily 30 tablet 3   • venlafaxine (EFFEXOR-XR) 75 mg 24 hr capsule Take 1 capsule (75 mg total) by mouth daily with breakfast 90 capsule 1     No current facility-administered medications for this visit.     No Known Allergies   Immunizations:     Immunization History   Administered Date(s) Administered   • COVID-19 PFIZER VACCINE 0.3 ML IM 06/17/2021, 07/14/2021, 01/17/2022   • Hep A, ped/adol, 2 dose 06/22/2006, 05/19/2010   • Hep B, adult 06/08/2006, 07/11/2006, 05/19/2010   • INFLUENZA 10/13/2010, 11/21/2012   • Pneumococcal Polysaccharide PPV23 05/06/2011   • Td (adult), Unspecified 06/08/2006   • Tdap 08/26/2020      Health Maintenance:         Topic Date Due   • Breast Cancer Screening: Mammogram  12/07/2024   • Colorectal Cancer Screening  07/20/2025   • Hepatitis C Screening  Completed         Topic Date Due   • Pneumococcal Vaccine: 65+ Years (2 - PCV) 05/06/2012   • COVID-19 Vaccine (4 - 2023-24 season) 09/01/2023      Medicare Screening Tests and Risk Assessments:     Kenzie is here for her Initial Wellness visit. Last Medicare Wellness visit information reviewed, patient interviewed and updates made to the record today.      Health Risk Assessment:   Patient rates overall health as good. Patient feels that their physical health rating is slightly better. Patient is satisfied with their life. Eyesight was rated as same. Hearing was rated as same. Patient feels that their emotional and mental health rating is same. Patients states they are never, rarely angry. Patient states they are never, rarely unusually tired/fatigued. Pain experienced in the last 7 days has been some. Patient's pain rating has been 4/10. Patient states that she has experienced no weight loss or gain in last 6 months.     Depression Screening:    PHQ-9 Score: 1      Fall Risk Screening:   In the past year, patient has experienced: no history of falling in past year      Urinary Incontinence Screening:   Patient has leaked urine accidently in the last six months.     Home Safety:  Patient does not have trouble with stairs inside or outside of their home. Patient has working smoke alarms and has working carbon monoxide detector. Home safety hazards include: none.     Nutrition:   Current diet is Regular.     Medications:   Patient is currently taking over-the-counter supplements. OTC medications include: see medication list. Patient is able to manage medications.     Activities of Daily Living (ADLs)/Instrumental Activities of Daily Living (IADLs):   Walk and transfer into and out of bed and chair?: Yes  Dress and groom yourself?: Yes    Bathe or shower yourself?: Yes    Feed yourself? Yes  Do your laundry/housekeeping?: Yes  Manage your money, pay your bills and track your expenses?: Yes  Make your own meals?: Yes    Do your own shopping?: Yes    Previous Hospitalizations:   Any hospitalizations or ED visits within the last 12 months?: No      Advance Care Planning:   Living will: No    Advanced directive: No      PREVENTIVE SCREENINGS      Cardiovascular Screening:    General: Screening Not Indicated and History Lipid Disorder      Diabetes Screening:     General: Screening Current      Colorectal Cancer Screening:     General: Screening Current      Breast Cancer Screening:     General: Screening Current      Cervical Cancer Screening:    General: Screening Not Indicated      Lung Cancer Screening:     General: Screening Not Indicated      Hepatitis C Screening:    General: Screening Current    Screening, Brief Intervention, and Referral to Treatment (SBIRT)    Screening  Typical number of drinks in a day: 2  Typical number of drinks in a week: 15  Interpretation: Low risk drinking behavior.    Single Item Drug Screening:  How often have you used an  "illegal drug (including marijuana) or a prescription medication for non-medical reasons in the past year? never    Single Item Drug Screen Score: 0  Interpretation: Negative screen for possible drug use disorder    No results found.     Physical Exam:     /88 (BP Location: Left arm, Patient Position: Sitting, Cuff Size: Standard)   Pulse 92   Temp 97.5 °F (36.4 °C) (Temporal)   Ht 5' 6.5\" (1.689 m)   Wt 70.3 kg (155 lb)   SpO2 96% Comment: room air  BMI 24.64 kg/m²     Physical Exam     Maura Mcintosh DO  "

## 2024-01-26 NOTE — PROGRESS NOTES
Assessment/Plan:    Medicare annual wellness visit, subsequent  -Medicare annual wellness visit done   - last Cologuard test was done in 2022.  Patient will be due for repeat Cologuard test in 2025.  -  Lung cancer screen is not indicated because patient quit smoking 17 years ago.  - DEXA scan was done last year and showed osteopenia.  Continue with vitamin D and calcium.  -last mammogram last month and will be due at the end of this year.  -Patient is up-to-date with 2 COVID shots, Tdap, pneumococcal vaccine and is due for the pneumococcal 20 Valent vaccine but does not want any vaccines today including the pneumococcal and COVID boosters.  -follow-up in 6 months.    Elevated hemoglobin  -Last done on 8/23/2023 elevated hemoglobin at 17.7, up from 16.1 on 5/19/2021  -We have ordered CBC and follow-up with results    Mild persistent asthma/COPD  -Stable without acute exacerbation  -Will refill Trelegy inhaler as requested  -Continue as needed albuterol inhaler    Essential hypertension  -Blood pressure has not been well-controlled recently  -Patient was counseled to monitor her blood pressure twice a day, in the morning and in the evening and to keep a blood pressure log and to call the office if her blood pressure is consistently elevated above 130/80  -For now, continue with amlodipine and losartan    Nicotine dependence  -Resolved, patient quit smoking years ago    Reactive depression  -Stable, currently on Effexor  -Continue with Effexor at current dose    Prediabetes  -Stable  -Try to stick to a low-carb and low sugar diet and increase exercise    Hyperlipidemia  -Stable  -Continue with rosuvastatin  -We will follow-up with the results of the CMP to check liver function since patient was recently started on statin    Marijuana use  -Much improved and only using intermittently  -Patient was encouraged to stop completely    Anterior neck mass  -Per patient, this is chronic  -We will order an ultrasound of the  neck to evaluate the anterior neck and thyroid gland     Diagnoses and all orders for this visit:    Medicare annual wellness visit, subsequent  -     CBC and differential; Future  -     Comprehensive metabolic panel; Future  -     Hemoglobin A1C; Future    Mild persistent reactive airway disease without complication  -     fluticasone-umeclidinium-vilanterol 200-62.5-25 mcg/actuation AEPB inhaler; Inhale 1 puff daily Rinse mouth after use.  -     CBC and differential; Future  -     Comprehensive metabolic panel; Future  -     Hemoglobin A1C; Future    Essential hypertension  -     CBC and differential; Future  -     Comprehensive metabolic panel; Future  -     Hemoglobin A1C; Future    Tobacco abuse  -     CBC and differential; Future  -     Comprehensive metabolic panel; Future  -     Hemoglobin A1C; Future    Reactive depression  -     CBC and differential; Future  -     Comprehensive metabolic panel; Future  -     Hemoglobin A1C; Future    Prediabetes  -     CBC and differential; Future  -     Comprehensive metabolic panel; Future  -     Hemoglobin A1C; Future    Other hyperlipidemia  -     CBC and differential; Future  -     Comprehensive metabolic panel; Future  -     Hemoglobin A1C; Future  -     Lipid panel; Future    Marijuana use  -     CBC and differential; Future  -     Comprehensive metabolic panel; Future  -     Hemoglobin A1C; Future    Acute exacerbation of chronic obstructive pulmonary disease (COPD) (HCC)  -     fluticasone-umeclidinium-vilanterol 200-62.5-25 mcg/actuation AEPB inhaler; Inhale 1 puff daily Rinse mouth after use.  -     CBC and differential; Future  -     Comprehensive metabolic panel; Future  -     Hemoglobin A1C; Future    Encounter for screening mammogram for breast cancer  Comments:  last mammogram was in Dec 2023 and she will be due in Dec 2024  Orders:  -     CBC and differential; Future  -     Comprehensive metabolic panel; Future  -     Hemoglobin A1C; Future    Screening  for colorectal cancer  Comments:  last cologuard test was in 2022 and was neg, she will be due next year  Orders:  -     CBC and differential; Future  -     Comprehensive metabolic panel; Future  -     Hemoglobin A1C; Future    Osteopenia of other site  Comments:  last dexa scan was last year and showed osteopenia and she takes a multivitamin with ca and vit d  Orders:  -     CBC and differential; Future  -     Comprehensive metabolic panel; Future  -     Hemoglobin A1C; Future    Encounter for immunization  Comments:  she is up to date with 2 covid shots, does not take the flu shot, tdap, pneumococcal vaccine and she is due for the pneumococcal 20 vaccine but she declines  Orders:  -     fluticasone-umeclidinium-vilanterol 200-62.5-25 mcg/actuation AEPB inhaler; Inhale 1 puff daily Rinse mouth after use.  -     CBC and differential; Future  -     Comprehensive metabolic panel; Future  -     Hemoglobin A1C; Future    Elevated hemoglobin (HCC)    Localized swelling, mass and lump, neck  -     US head neck soft tissue; Future          Subjective:      Patient ID: Kenzie Al is a 68 y.o. female.    HPI    Pt presents for a subsequent medicare wellness visit  Patient presents for a follow-up visit regarding her mild intermittent asthma,/COPD essential hypertension, reactive depression, prediabetes, hyperlipidemia.  She states that she  has been taking her medications as prescribed.  Today, she states that she is using the trelegy and it helps her lung symptoms so much that she does not need to use her rescue inhaler.  Breathing better   Needs a refill of trelegy   Mood is okay   She is working on her health care poa   She smoked cigarettes from 16 to 51 and then smoked pot since then but quit last year and occasionally takes a little at parties - a puff or two  Smoked max of 2 packs a day and quit 17 years ago  She does not check her blood pressure at home  Has been on crestor for a few days and this was started  by her cardiologist.  She admits to diaphoresis, secondary to hot flashes, occasional cough, occasional palpitations and dysphoria but denies  any fever, chills, headache, dizziness, nasal congestion, runny nose, pnd, sore throat, ear ache, sinus pain or pressure, wheezing,  chest pain, sob, nausea, vomiting, diarrhea, constipation, hematochezia, hematuria, melena stools, arthralgias, myalgias, feelings of anxiety, or insomnia.      The following portions of the patient's history were reviewed and updated as appropriate: She  has a past medical history of Asthma, Depression, History of transfusion, Hypertension, and PONV (postoperative nausea and vomiting).  She   Patient Active Problem List    Diagnosis Date Noted   • Elevated hemoglobin ([facility]) 01/26/2024   • Acute exacerbation of chronic obstructive pulmonary disease (COPD) ([facility]) 09/12/2023   • Arrhythmia 08/21/2023   • Tachycardia 08/21/2023   • Mild intermittent asthma with acute exacerbation 08/21/2023   • Chronic cough 07/17/2023   • Reactive airway disease without complication 07/15/2022   • Influenza vaccination declined by patient 12/09/2021   • Nail abnormalities 09/09/2021   • Reactive depression 09/09/2021   • Medication side effect, initial encounter 09/09/2021   • Dyspnea on exertion 06/02/2021   • Hyponatremia 06/02/2021   • Other hyperlipidemia 05/05/2021   • Prediabetes 05/05/2021   • Aftercare following surgery 10/08/2020   • Essential hypertension 08/27/2020   • Closed fracture of olecranon process of right ulna 08/27/2020   • Pain and swelling of upper extremity, right 08/26/2020   • Bruising of multiple areas 08/26/2020   • Right elbow pain 08/26/2020   • Breast cancer screening 08/26/2020   • Encounter for screening for lung cancer 08/26/2020     She  has a past surgical history that includes Bunionectomy; Tubal ligation; ELBOW FRACTURE REPAIR (Right, 08/31/2020); and Fracture surgery.  Her family history includes Hypertension in her mother;  Kidney cancer in her father; Lung cancer (age of onset: 49) in her father; Melanoma (age of onset: 18) in her sister; No Known Problems in her brother, maternal aunt, maternal aunt, maternal aunt, maternal aunt, maternal aunt, maternal aunt, maternal aunt, maternal aunt, maternal grandfather, maternal grandmother, paternal aunt, paternal aunt, paternal grandfather, and paternal grandmother; Parkinsonism in her mother.  She  reports that she quit smoking about 24 years ago. Her smoking use included cigarettes. She started smoking about 44 years ago. She has a 30.0 pack-year smoking history. She has never used smokeless tobacco. She reports current alcohol use of about 7.0 standard drinks of alcohol per week. She reports that she does not currently use drugs after having used the following drugs: Marijuana. Frequency: 7.00 times per week.  Current Outpatient Medications   Medication Sig Dispense Refill   • albuterol (PROVENTIL HFA,VENTOLIN HFA) 90 mcg/act inhaler Inhale 2 puffs every 6 (six) hours as needed for wheezing or shortness of breath 8 g 5   • amLODIPine (NORVASC) 5 mg tablet Take 1 tablet (5 mg total) by mouth daily No child proof caps 90 tablet 1   • clobetasol (TEMOVATE) 0.05 % cream as needed     • fluticasone-umeclidinium-vilanterol 200-62.5-25 mcg/actuation AEPB inhaler Inhale 1 puff daily Rinse mouth after use. 1 each 5   • losartan (Cozaar) 100 MG tablet Take 1 tablet (100 mg total) by mouth daily 90 tablet 1   • multivitamin (THERAGRAN) TABS Take 1 tablet by mouth daily     • rosuvastatin (CRESTOR) 10 MG tablet Take 1 tablet (10 mg total) by mouth daily 30 tablet 3   • venlafaxine (EFFEXOR-XR) 75 mg 24 hr capsule Take 1 capsule (75 mg total) by mouth daily with breakfast 90 capsule 1     No current facility-administered medications for this visit.     Current Outpatient Medications on File Prior to Visit   Medication Sig   • albuterol (PROVENTIL HFA,VENTOLIN HFA) 90 mcg/act inhaler Inhale 2 puffs  every 6 (six) hours as needed for wheezing or shortness of breath   • amLODIPine (NORVASC) 5 mg tablet Take 1 tablet (5 mg total) by mouth daily No child proof caps   • clobetasol (TEMOVATE) 0.05 % cream as needed   • losartan (Cozaar) 100 MG tablet Take 1 tablet (100 mg total) by mouth daily   • multivitamin (THERAGRAN) TABS Take 1 tablet by mouth daily   • rosuvastatin (CRESTOR) 10 MG tablet Take 1 tablet (10 mg total) by mouth daily   • venlafaxine (EFFEXOR-XR) 75 mg 24 hr capsule Take 1 capsule (75 mg total) by mouth daily with breakfast   • [DISCONTINUED] fluticasone-umeclidinium-vilanterol 200-62.5-25 mcg/actuation AEPB inhaler Inhale 1 puff daily Rinse mouth after use.     No current facility-administered medications on file prior to visit.     She has No Known Allergies..    Review of Systems   Constitutional:  Positive for diaphoresis. Negative for activity change, chills, fatigue, fever and unexpected weight change.   HENT:  Negative for ear pain, postnasal drip, rhinorrhea, sinus pressure and sore throat.    Eyes:  Negative for pain.   Respiratory:  Positive for cough. Negative for choking, chest tightness, shortness of breath and wheezing.    Cardiovascular:  Positive for palpitations (on exertion). Negative for chest pain and leg swelling.   Gastrointestinal:  Negative for abdominal pain, constipation, diarrhea, nausea and vomiting.   Genitourinary:  Negative for dysuria and hematuria.   Musculoskeletal:  Negative for arthralgias, back pain, gait problem, joint swelling, myalgias and neck stiffness.   Skin:  Negative for pallor and rash.   Neurological:  Negative for dizziness, tremors, seizures, syncope, light-headedness and headaches.   Hematological:  Negative for adenopathy.   Psychiatric/Behavioral:  Positive for dysphoric mood. Negative for behavioral problems and sleep disturbance.          Objective:      /88 (BP Location: Left arm, Patient Position: Sitting, Cuff Size: Standard)   Pulse  "92   Temp 97.5 °F (36.4 °C) (Temporal)   Ht 5' 6.5\" (1.689 m)   Wt 70.3 kg (155 lb)   SpO2 96% Comment: room air  BMI 24.64 kg/m²          Physical Exam  Constitutional:       General: She is not in acute distress.     Appearance: She is well-developed. She is not diaphoretic.   HENT:      Head: Normocephalic and atraumatic.      Right Ear: External ear normal.      Left Ear: External ear normal.      Nose: Nose normal.      Mouth/Throat:      Mouth: Mucous membranes are dry.      Pharynx: Posterior oropharyngeal erythema present. No oropharyngeal exudate.   Eyes:      General: No scleral icterus.        Right eye: No discharge.         Left eye: No discharge.      Conjunctiva/sclera: Conjunctivae normal.      Pupils: Pupils are equal, round, and reactive to light.   Neck:      Thyroid: No thyroid mass (Anterior neck mass, measures about 3.5 cm in diameter, soft and concerning for possible lipoma) or thyromegaly.      Vascular: No JVD.      Trachea: No tracheal deviation.     Cardiovascular:      Rate and Rhythm: Normal rate and regular rhythm.      Heart sounds: Normal heart sounds. No murmur heard.     No friction rub. No gallop.   Pulmonary:      Effort: Pulmonary effort is normal. No respiratory distress.      Breath sounds: Examination of the right-lower field reveals rales. Rales (Right lower lung zone Rales, not cleared by coughing) present. No wheezing.   Chest:      Chest wall: No tenderness.   Abdominal:      General: Bowel sounds are normal. There is no distension.      Palpations: Abdomen is soft. There is no mass.      Tenderness: There is no abdominal tenderness. There is no guarding or rebound.   Musculoskeletal:         General: No tenderness or deformity. Normal range of motion.      Cervical back: Normal range of motion and neck supple.   Lymphadenopathy:      Cervical: No cervical adenopathy.   Skin:     General: Skin is warm and dry.      Coloration: Skin is not pale.      Findings: No " erythema or rash.   Neurological:      Mental Status: She is alert and oriented to person, place, and time.      Cranial Nerves: No cranial nerve deficit.      Motor: No abnormal muscle tone.      Coordination: Coordination normal.      Deep Tendon Reflexes: Reflexes are normal and symmetric.   Psychiatric:         Behavior: Behavior normal.           Hospital Outpatient Visit on 01/16/2024   Component Date Value Ref Range Status   • BSA 01/16/2024 1.79  m2 Final   • A4C EF 01/16/2024 70  % Final   • LVOT stroke volume 01/16/2024 55.04   Final   • LVOT stroke volume index 01/16/2024 30.20  ml/m2 Final   • LVOT Cardiac Output 01/16/2024 4.37  l/min Final   • LVOT Cardiac Index 01/16/2024 2.44  l/min/m2 Final   • LVIDd 01/16/2024 4.20  cm Final   • LVIDS 01/16/2024 3.10  cm Final   • IVSd 01/16/2024 0.80  cm Final   • LVPWd 01/16/2024 0.80  cm Final   • LVOT diameter 01/16/2024 1.7  cm Final   • LVOT peak VTI 01/16/2024 24.26  cm Final   • FS 01/16/2024 26  28 - 44 Final   • MV E' Tissue Velocity Septal 01/16/2024 7  cm/s Final   • MV E' Tissue Velocity Lateral 01/16/2024 7  cm/s Final   • LA Volume Index (BP) 01/16/2024 19.0  mL/m2 Final   • E/A ratio 01/16/2024 0.73   Final   • E wave deceleration time 01/16/2024 224  ms Final   • MV Peak E Syed 01/16/2024 80  cm/s Final   • MV Peak A Syed 01/16/2024 1.1  m/s Final   • AV LVOT peak gradient 01/16/2024 5  mmHg Final   • LVOT peak syed 01/16/2024 1.14  m/s Final   • RVID d 01/16/2024 3.5  cm Final   • Tricuspid annular plane systolic e* 01/16/2024 2.30  cm Final   • LA size 01/16/2024 3.4  cm Final   • LA length (A2C) 01/16/2024 5.30  cm Final   • LA volume (BP) 01/16/2024 34  mL Final   • RAA A4C 01/16/2024 13.5  cm2 Final   • Aortic valve peak velocity 01/16/2024 1.45  m/s Final   • Ao VTI 01/16/2024 31.03  cm Final   • AV mean gradient 01/16/2024 5  mmHg Final   • LVOT mn grad 01/16/2024 3.0  mmHg Final   • AV peak gradient 01/16/2024 8  mmHg Final   • AV area by cont  VTI 01/16/2024 1.8  cm2 Final   • AV area peak shaan 01/16/2024 1.8  cm2 Final   • Ao root 01/16/2024 3.10  cm Final   • Asc Ao 01/16/2024 2.9  cm Final   • Aortic valve mean velocity 01/16/2024 9.90  m/s Final   • Left ventricular stroke volume (2D) 01/16/2024 37.00  mL Final   • IVS 01/16/2024 0.8  cm Final   • LEFT VENTRICLE SYSTOLIC VOLUME (MO* 01/16/2024 39  mL Final   • LV DIASTOLIC VOLUME (MOD BIPLANE) * 01/16/2024 77  mL Final   • Left Atrium Area-systolic Four Josie* 01/16/2024 11.5  cm2 Final   • Left Atrium Area-systolic Apical T* 01/16/2024 16.4  cm2 Final   • LVSV, 2D 01/16/2024 37  mL Final   • LVOT area 01/16/2024 2.27  cm2 Final   • DVI 01/16/2024 0.79   Final   • AV valve area 01/16/2024 1.77  cm2 Final   • LV EF 01/16/2024 70   Final   • GLS 01/16/2024 -18  % Final   • Est. RA pres 01/16/2024 3.0  mmHg Final   Office Visit on 09/12/2023   Component Date Value Ref Range Status   • POCT SARS-CoV-2 Ag 09/12/2023 Negative  Negative Final   • VALID CONTROL 09/12/2023 Valid   Final   Appointment on 08/23/2023   Component Date Value Ref Range Status   • Hemoglobin A1C 08/23/2023 6.4 (H)  Normal 4.0-5.6%; PreDiabetic 5.7-6.4%; Diabetic >=6.5%; Glycemic control for adults with diabetes <7.0% % Final   • EAG 08/23/2023 137  mg/dl Final   • WBC 08/23/2023 7.98  4.31 - 10.16 Thousand/uL Final   • RBC 08/23/2023 5.69 (H)  3.81 - 5.12 Million/uL Final   • Hemoglobin 08/23/2023 17.7 (H)  11.5 - 15.4 g/dL Final   • Hematocrit 08/23/2023 52.1 (H)  34.8 - 46.1 % Final   • MCV 08/23/2023 92  82 - 98 fL Final   • MCH 08/23/2023 31.1  26.8 - 34.3 pg Final   • MCHC 08/23/2023 34.0  31.4 - 37.4 g/dL Final   • RDW 08/23/2023 12.0  11.6 - 15.1 % Final   • MPV 08/23/2023 10.6  8.9 - 12.7 fL Final   • Platelets 08/23/2023 284  149 - 390 Thousands/uL Final   • nRBC 08/23/2023 0  /100 WBCs Final   • Neutrophils Relative 08/23/2023 55  43 - 75 % Final   • Immat GRANS % 08/23/2023 0  0 - 2 % Final   • Lymphocytes Relative 08/23/2023  34  14 - 44 % Final   • Monocytes Relative 08/23/2023 9  4 - 12 % Final   • Eosinophils Relative 08/23/2023 1  0 - 6 % Final   • Basophils Relative 08/23/2023 1  0 - 1 % Final   • Neutrophils Absolute 08/23/2023 4.44  1.85 - 7.62 Thousands/µL Final   • Immature Grans Absolute 08/23/2023 0.02  0.00 - 0.20 Thousand/uL Final   • Lymphocytes Absolute 08/23/2023 2.67  0.60 - 4.47 Thousands/µL Final   • Monocytes Absolute 08/23/2023 0.73  0.17 - 1.22 Thousand/µL Final   • Eosinophils Absolute 08/23/2023 0.04  0.00 - 0.61 Thousand/µL Final   • Basophils Absolute 08/23/2023 0.08  0.00 - 0.10 Thousands/µL Final   • Cholesterol 08/23/2023 213 (H)  See Comment mg/dL Final    Cholesterol:         Pediatric <18 Years        Desirable          <170 mg/dL      Borderline High    170-199 mg/dL      High               >=200 mg/dL        Adult >=18 Years            Desirable         <200 mg/dL      Borderline High   200-239 mg/dL      High              >239 mg/dL     • Triglycerides 08/23/2023 86  See Comment mg/dL Final    Triglyceride:     0-9Y            <75mg/dL     10Y-17Y         <90 mg/dL       >=18Y     Normal          <150 mg/dL     Borderline High 150-199 mg/dL     High            200-499 mg/dL        Very High       >499 mg/dL    Specimen collection should occur prior to Metamizole administration due to the potential for falsely depressed results.   • HDL, Direct 08/23/2023 60  >=50 mg/dL Final   • LDL Calculated 08/23/2023 136 (H)  0 - 100 mg/dL Final    LDL Cholesterol:     Optimal           <100 mg/dl     Near Optimal      100-129 mg/dl     Above Optimal       Borderline High 130-159 mg/dl       High            160-189 mg/dl       Very High       >189 mg/dl         This screening LDL is a calculated result.   It does not have the accuracy of the Direct Measured LDL in the monitoring of patients with hyperlipidemia and/or statin therapy.   Direct Measure LDL (TVS472) must be ordered separately in these patients.   •  Non-HDL-Chol (CHOL-HDL) 08/23/2023 153  mg/dl Final   • TSH 3RD GENERATON 08/23/2023 0.540  0.450 - 4.500 uIU/mL Final    The recommended reference ranges for TSH during pregnancy are as follows:   First trimester 0.1 to 2.5 uIU/mL   Second trimester  0.2 to 3.0 uIU/mL   Third trimester 0.3 to 3.0 uIU/m    Note: Normal ranges may not apply to patients who are transgender, non-binary, or whose legal sex, sex at birth, and gender identity differ.  Adult TSH (3rd generation) reference range follows the recommended guidelines of the [employer] Thy[employer] Association, January, 2020.   • Sodium 08/23/2023 136  135 - 147 mmol/L Final   • Potassium 08/23/2023 4.3  3.5 - 5.3 mmol/L Final   • Chloride 08/23/2023 96  96 - 108 mmol/L Final   • CO2 08/23/2023 29  21 - 32 mmol/L Final   • ANION GAP 08/23/2023 11  mmol/L Final   • BUN 08/23/2023 10  5 - 25 mg/dL Final   • Creatinine 08/23/2023 0.83  0.60 - 1.30 mg/dL Final    Standardized to IDMS reference method   • Glucose, Fasting 08/23/2023 126 (H)  65 - 99 mg/dL Final   • Calcium 08/23/2023 10.0  8.4 - 10.2 mg/dL Final   • AST 08/23/2023 18  13 - 39 U/L Final   • ALT 08/23/2023 20  7 - 52 U/L Final    Specimen collection should occur prior to Sulfasalazine administration due to the potential for falsely depressed results.    • Alkaline Phosphatase 08/23/2023 92  34 - 104 U/L Final   • Total Protein 08/23/2023 7.6  6.4 - 8.4 g/dL Final   • Albumin 08/23/2023 4.6  3.5 - 5.0 g/dL Final   • Total Bilirubin 08/23/2023 1.03 (H)  0.20 - 1.00 mg/dL Final    Use of this assay is not recommended for patients undergoing treatment with eltrombopag due to the potential for falsely elevated results.  N-acetyl-p-benzoquinone imine (metabolite of Acetaminophen) will generate erroneously low results in samples for patients that have taken an overdose of Acetaminophen.   • eGFR 08/23/2023 74  ml/min/1.73sq m Final   • Magnesium 08/23/2023 1.9  1.9 - 2.7 mg/dL Final

## 2024-01-26 NOTE — PATIENT INSTRUCTIONS
Medicare Preventive Visit Patient Instructions  Thank you for completing your Welcome to Medicare Visit or Medicare Annual Wellness Visit today. Your next wellness visit will be due in one year (1/26/2025).  The screening/preventive services that you may require over the next 5-10 years are detailed below. Some tests may not apply to you based off risk factors and/or age. Screening tests ordered at today's visit but not completed yet may show as past due. Also, please note that scanned in results may not display below.  Preventive Screenings:  Service Recommendations Previous Testing/Comments   Colorectal Cancer Screening  * Colonoscopy    * Fecal Occult Blood Test (FOBT)/Fecal Immunochemical Test (FIT)  * Fecal DNA/Cologuard Test  * Flexible Sigmoidoscopy Age: 45-75 years old   Colonoscopy: every 10 years (may be performed more frequently if at higher risk)  OR  FOBT/FIT: every 1 year  OR  Cologuard: every 3 years  OR  Sigmoidoscopy: every 5 years  Screening may be recommended earlier than age 45 if at higher risk for colorectal cancer. Also, an individualized decision between you and your healthcare provider will decide whether screening between the ages of 76-85 would be appropriate. Colonoscopy: 07/20/2022  FOBT/FIT: 07/20/2022  Cologuard: 07/20/2022  Sigmoidoscopy: 07/20/2022    Screening Current     Breast Cancer Screening Age: 40+ years old  Frequency: every 1-2 years  Not required if history of left and right mastectomy Mammogram: 12/07/2023    Screening Current   Cervical Cancer Screening Between the ages of 21-29, pap smear recommended once every 3 years.   Between the ages of 30-65, can perform pap smear with HPV co-testing every 5 years.   Recommendations may differ for women with a history of total hysterectomy, cervical cancer, or abnormal pap smears in past. Pap Smear: Not on file    Screening Not Indicated   Hepatitis C Screening Once for adults born between 1945 and 1965  More frequently in patients  at high risk for Hepatitis C Hep C Antibody: 05/19/2021    Screening Current   Diabetes Screening 1-2 times per year if you're at risk for diabetes or have pre-diabetes Fasting glucose: 126 mg/dL (8/23/2023)  A1C: 6.4 % (8/23/2023)  Screening Current   Cholesterol Screening Once every 5 years if you don't have a lipid disorder. May order more often based on risk factors. Lipid panel: 08/23/2023    Screening Not Indicated  History Lipid Disorder     Other Preventive Screenings Covered by Medicare:  Abdominal Aortic Aneurysm (AAA) Screening: covered once if your at risk. You're considered to be at risk if you have a family history of AAA.  Lung Cancer Screening: covers low dose CT scan once per year if you meet all of the following conditions: (1) Age 55-77; (2) No signs or symptoms of lung cancer; (3) Current smoker or have quit smoking within the last 15 years; (4) You have a tobacco smoking history of at least 20 pack years (packs per day multiplied by number of years you smoked); (5) You get a written order from a healthcare provider.  Glaucoma Screening: covered annually if you're considered high risk: (1) You have diabetes OR (2) Family history of glaucoma OR (3)  aged 50 and older OR (4)  American aged 65 and older  Osteoporosis Screening: covered every 2 years if you meet one of the following conditions: (1) You're estrogen deficient and at risk for osteoporosis based off medical history and other findings; (2) Have a vertebral abnormality; (3) On glucocorticoid therapy for more than 3 months; (4) Have primary hyperparathyroidism; (5) On osteoporosis medications and need to assess response to drug therapy.   Last bone density test (DXA Scan): 12/07/2023.  HIV Screening: covered annually if you're between the age of 15-65. Also covered annually if you are younger than 15 and older than 65 with risk factors for HIV infection. For pregnant patients, it is covered up to 3 times per  pregnancy.    Immunizations:  Immunization Recommendations   Influenza Vaccine Annual influenza vaccination during flu season is recommended for all persons aged >= 6 months who do not have contraindications   Pneumococcal Vaccine   * Pneumococcal conjugate vaccine = PCV13 (Prevnar 13), PCV15 (Vaxneuvance), PCV20 (Prevnar 20)  * Pneumococcal polysaccharide vaccine = PPSV23 (Pneumovax) Adults 19-65 yo with certain risk factors or if 65+ yo  If never received any pneumonia vaccine: recommend Prevnar 20 (PCV20)  Give PCV20 if previously received 1 dose of PCV13 or PPSV23   Hepatitis B Vaccine 3 dose series if at intermediate or high risk (ex: diabetes, end stage renal disease, liver disease)   Respiratory syncytial virus (RSV) Vaccine - COVERED BY MEDICARE PART D  * RSVPreF3 (Arexvy) CDC recommends that adults 60 years of age and older may receive a single dose of RSV vaccine using shared clinical decision-making (SCDM)   Tetanus (Td) Vaccine - COST NOT COVERED BY MEDICARE PART B Following completion of primary series, a booster dose should be given every 10 years to maintain immunity against tetanus. Td may also be given as tetanus wound prophylaxis.   Tdap Vaccine - COST NOT COVERED BY MEDICARE PART B Recommended at least once for all adults. For pregnant patients, recommended with each pregnancy.   Shingles Vaccine (Shingrix) - COST NOT COVERED BY MEDICARE PART B  2 shot series recommended in those 19 years and older who have or will have weakened immune systems or those 50 years and older     Health Maintenance Due:      Topic Date Due   • Breast Cancer Screening: Mammogram  12/07/2024   • Colorectal Cancer Screening  07/20/2025   • Hepatitis C Screening  Completed     Immunizations Due:      Topic Date Due   • Pneumococcal Vaccine: 65+ Years (2 - PCV) 05/06/2012   • COVID-19 Vaccine (4 - 2023-24 season) 09/01/2023     Advance Directives   What are advance directives?  Advance directives are legal documents that  state your wishes and plans for medical care. These plans are made ahead of time in case you lose your ability to make decisions for yourself. Advance directives can apply to any medical decision, such as the treatments you want, and if you want to donate organs.   What are the types of advance directives?  There are many types of advance directives, and each state has rules about how to use them. You may choose a combination of any of the following:  Living will:  This is a written record of the treatment you want. You can also choose which treatments you do not want, which to limit, and which to stop at a certain time. This includes surgery, medicine, IV fluid, and tube feedings.   Durable power of  for healthcare (DPAHC):  This is a written record that states who you want to make healthcare choices for you when you are unable to make them for yourself. This person, called a proxy, is usually a family member or a friend. You may choose more than 1 proxy.  Do not resuscitate (DNR) order:  A DNR order is used in case your heart stops beating or you stop breathing. It is a request not to have certain forms of treatment, such as CPR. A DNR order may be included in other types of advance directives.  Medical directive:  This covers the care that you want if you are in a coma, near death, or unable to make decisions for yourself. You can list the treatments you want for each condition. Treatment may include pain medicine, surgery, blood transfusions, dialysis, IV or tube feedings, and a ventilator (breathing machine).  Values history:  This document has questions about your views, beliefs, and how you feel and think about life. This information can help others choose the care that you would choose.  Why are advance directives important?  An advance directive helps you control your care. Although spoken wishes may be used, it is better to have your wishes written down. Spoken wishes can be misunderstood, or not  followed. Treatments may be given even if you do not want them. An advance directive may make it easier for your family to make difficult choices about your care.   Urinary Incontinence   Urinary incontinence (UI)  is when you lose control of your bladder. UI develops because your bladder cannot store or empty urine properly. The 3 most common types of UI are stress incontinence, urge incontinence, or both.  Medicines:   May be given to help strengthen your bladder control. Report any side effects of medication to your healthcare provider.  Do pelvic muscle exercises often:  Your pelvic muscles help you stop urinating. Squeeze these muscles tight for 5 seconds, then relax for 5 seconds. Gradually work up to squeezing for 10 seconds. Do 3 sets of 15 repetitions a day, or as directed. This will help strengthen your pelvic muscles and improve bladder control.  Train your bladder:  Go to the bathroom at set times, such as every 2 hours, even if you do not feel the urge to go. You can also try to hold your urine when you feel the urge to go. For example, hold your urine for 5 minutes when you feel the urge to go. As that becomes easier, hold your urine for 10 minutes.   Self-care:   Keep a UI record.  Write down how often you leak urine and how much you leak. Make a note of what you were doing when you leaked urine.  Drink liquids as directed. You may need to limit the amount of liquid you drink to help control your urine leakage. Do not drink any liquid right before you go to bed. Limit or do not have drinks that contain caffeine or alcohol.   Prevent constipation.  Eat a variety of high-fiber foods. Good examples are high-fiber cereals, beans, vegetables, and whole-grain breads. Walking is the best way to trigger your intestines to have a bowel movement.  Exercise regularly and maintain a healthy weight.  Weight loss and exercise will decrease pressure on your bladder and help you control your leakage.   Use a catheter  as directed  to help empty your bladder. A catheter is a tiny, plastic tube that is put into your bladder to drain your urine.   Go to behavior therapy as directed.  Behavior therapy may be used to help you learn to control your urge to urinate.     © Copyright [employer] 2018 Information is for End User's use only and may not be sold, redistributed or otherwise used for commercial purposes. All illustrations and images included in CareNotes® are the copyrighted property of [employer] or [employer]      Medicare Preventive Visit Patient Instructions  Thank you for completing your Welcome to Medicare Visit or Medicare Annual Wellness Visit today. Your next wellness visit will be due in one year (1/26/2025).  The screening/preventive services that you may require over the next 5-10 years are detailed below. Some tests may not apply to you based off risk factors and/or age. Screening tests ordered at today's visit but not completed yet may show as past due. Also, please note that scanned in results may not display below.  Preventive Screenings:  Service Recommendations Previous Testing/Comments   Colorectal Cancer Screening  * Colonoscopy    * Fecal Occult Blood Test (FOBT)/Fecal Immunochemical Test (FIT)  * Fecal DNA/Cologuard Test  * Flexible Sigmoidoscopy Age: 45-75 years old   Colonoscopy: every 10 years (may be performed more frequently if at higher risk)  OR  FOBT/FIT: every 1 year  OR  Cologuard: every 3 years  OR  Sigmoidoscopy: every 5 years  Screening may be recommended earlier than age 45 if at higher risk for colorectal cancer. Also, an individualized decision between you and your healthcare provider will decide whether screening between the ages of 76-85 would be appropriate. Colonoscopy: 07/20/2022  FOBT/FIT: 07/20/2022  Cologuard: 07/20/2022  Sigmoidoscopy: 07/20/2022    Screening Current     Breast Cancer Screening Age: 40+ years old  Frequency: every 1-2 years  Not required if history  of left and right mastectomy Mammogram: 12/07/2023    Screening Current   Cervical Cancer Screening Between the ages of 21-29, pap smear recommended once every 3 years.   Between the ages of 30-65, can perform pap smear with HPV co-testing every 5 years.   Recommendations may differ for women with a history of total hysterectomy, cervical cancer, or abnormal pap smears in past. Pap Smear: Not on file    Screening Not Indicated   Hepatitis C Screening Once for adults born between 1945 and 1965  More frequently in patients at high risk for Hepatitis C Hep C Antibody: 05/19/2021    Screening Current   Diabetes Screening 1-2 times per year if you're at risk for diabetes or have pre-diabetes Fasting glucose: 126 mg/dL (8/23/2023)  A1C: 6.4 % (8/23/2023)  Screening Current   Cholesterol Screening Once every 5 years if you don't have a lipid disorder. May order more often based on risk factors. Lipid panel: 08/23/2023    Screening Not Indicated  History Lipid Disorder     Other Preventive Screenings Covered by Medicare:  Abdominal Aortic Aneurysm (AAA) Screening: covered once if your at risk. You're considered to be at risk if you have a family history of AAA.  Lung Cancer Screening: covers low dose CT scan once per year if you meet all of the following conditions: (1) Age 55-77; (2) No signs or symptoms of lung cancer; (3) Current smoker or have quit smoking within the last 15 years; (4) You have a tobacco smoking history of at least 20 pack years (packs per day multiplied by number of years you smoked); (5) You get a written order from a healthcare provider.  Glaucoma Screening: covered annually if you're considered high risk: (1) You have diabetes OR (2) Family history of glaucoma OR (3)  aged 50 and older OR (4)  American aged 65 and older  Osteoporosis Screening: covered every 2 years if you meet one of the following conditions: (1) You're estrogen deficient and at risk for osteoporosis based  off medical history and other findings; (2) Have a vertebral abnormality; (3) On glucocorticoid therapy for more than 3 months; (4) Have primary hyperparathyroidism; (5) On osteoporosis medications and need to assess response to drug therapy.   Last bone density test (DXA Scan): 12/07/2023.  HIV Screening: covered annually if you're between the age of 15-65. Also covered annually if you are younger than 15 and older than 65 with risk factors for HIV infection. For pregnant patients, it is covered up to 3 times per pregnancy.    Immunizations:  Immunization Recommendations   Influenza Vaccine Annual influenza vaccination during flu season is recommended for all persons aged >= 6 months who do not have contraindications   Pneumococcal Vaccine   * Pneumococcal conjugate vaccine = PCV13 (Prevnar 13), PCV15 (Vaxneuvance), PCV20 (Prevnar 20)  * Pneumococcal polysaccharide vaccine = PPSV23 (Pneumovax) Adults 19-63 yo with certain risk factors or if 65+ yo  If never received any pneumonia vaccine: recommend Prevnar 20 (PCV20)  Give PCV20 if previously received 1 dose of PCV13 or PPSV23   Hepatitis B Vaccine 3 dose series if at intermediate or high risk (ex: diabetes, end stage renal disease, liver disease)   Respiratory syncytial virus (RSV) Vaccine - COVERED BY MEDICARE PART D  * RSVPreF3 (Arexvy) CDC recommends that adults 60 years of age and older may receive a single dose of RSV vaccine using shared clinical decision-making (SCDM)   Tetanus (Td) Vaccine - COST NOT COVERED BY MEDICARE PART B Following completion of primary series, a booster dose should be given every 10 years to maintain immunity against tetanus. Td may also be given as tetanus wound prophylaxis.   Tdap Vaccine - COST NOT COVERED BY MEDICARE PART B Recommended at least once for all adults. For pregnant patients, recommended with each pregnancy.   Shingles Vaccine (Shingrix) - COST NOT COVERED BY MEDICARE PART B  2 shot series recommended in those 19  years and older who have or will have weakened immune systems or those 50 years and older     Health Maintenance Due:      Topic Date Due   • Breast Cancer Screening: Mammogram  12/07/2024   • Colorectal Cancer Screening  07/20/2025   • Hepatitis C Screening  Completed     Immunizations Due:      Topic Date Due   • Pneumococcal Vaccine: 65+ Years (2 - PCV) 05/06/2012   • COVID-19 Vaccine (4 - 2023-24 season) 09/01/2023     Advance Directives   What are advance directives?  Advance directives are legal documents that state your wishes and plans for medical care. These plans are made ahead of time in case you lose your ability to make decisions for yourself. Advance directives can apply to any medical decision, such as the treatments you want, and if you want to donate organs.   What are the types of advance directives?  There are many types of advance directives, and each state has rules about how to use them. You may choose a combination of any of the following:  Living will:  This is a written record of the treatment you want. You can also choose which treatments you do not want, which to limit, and which to stop at a certain time. This includes surgery, medicine, IV fluid, and tube feedings.   Durable power of  for healthcare (DPAHC):  This is a written record that states who you want to make healthcare choices for you when you are unable to make them for yourself. This person, called a proxy, is usually a family member or a friend. You may choose more than 1 proxy.  Do not resuscitate (DNR) order:  A DNR order is used in case your heart stops beating or you stop breathing. It is a request not to have certain forms of treatment, such as CPR. A DNR order may be included in other types of advance directives.  Medical directive:  This covers the care that you want if you are in a coma, near death, or unable to make decisions for yourself. You can list the treatments you want for each condition. Treatment may  include pain medicine, surgery, blood transfusions, dialysis, IV or tube feedings, and a ventilator (breathing machine).  Values history:  This document has questions about your views, beliefs, and how you feel and think about life. This information can help others choose the care that you would choose.  Why are advance directives important?  An advance directive helps you control your care. Although spoken wishes may be used, it is better to have your wishes written down. Spoken wishes can be misunderstood, or not followed. Treatments may be given even if you do not want them. An advance directive may make it easier for your family to make difficult choices about your care.   Urinary Incontinence   Urinary incontinence (UI)  is when you lose control of your bladder. UI develops because your bladder cannot store or empty urine properly. The 3 most common types of UI are stress incontinence, urge incontinence, or both.  Medicines:   May be given to help strengthen your bladder control. Report any side effects of medication to your healthcare provider.  Do pelvic muscle exercises often:  Your pelvic muscles help you stop urinating. Squeeze these muscles tight for 5 seconds, then relax for 5 seconds. Gradually work up to squeezing for 10 seconds. Do 3 sets of 15 repetitions a day, or as directed. This will help strengthen your pelvic muscles and improve bladder control.  Train your bladder:  Go to the bathroom at set times, such as every 2 hours, even if you do not feel the urge to go. You can also try to hold your urine when you feel the urge to go. For example, hold your urine for 5 minutes when you feel the urge to go. As that becomes easier, hold your urine for 10 minutes.   Self-care:   Keep a UI record.  Write down how often you leak urine and how much you leak. Make a note of what you were doing when you leaked urine.  Drink liquids as directed. You may need to limit the amount of liquid you drink to help  control your urine leakage. Do not drink any liquid right before you go to bed. Limit or do not have drinks that contain caffeine or alcohol.   Prevent constipation.  Eat a variety of high-fiber foods. Good examples are high-fiber cereals, beans, vegetables, and whole-grain breads. Walking is the best way to trigger your intestines to have a bowel movement.  Exercise regularly and maintain a healthy weight.  Weight loss and exercise will decrease pressure on your bladder and help you control your leakage.   Use a catheter as directed  to help empty your bladder. A catheter is a tiny, plastic tube that is put into your bladder to drain your urine.   Go to behavior therapy as directed.  Behavior therapy may be used to help you learn to control your urge to urinate.     © Copyright [employer] 2018 Information is for End User's use only and may not be sold, redistributed or otherwise used for commercial purposes. All illustrations and images included in CareNotes® are the copyrighted property of [employer] or [employer]

## 2024-01-30 NOTE — ADDENDUM NOTE
Addended by: LUIS ENRIQUE ESCOBEDO on: 1/30/2024 04:48 PM     Modules accepted: Level of Service

## 2024-02-05 ENCOUNTER — HOSPITAL ENCOUNTER (OUTPATIENT)
Dept: PULMONOLOGY | Facility: HOSPITAL | Age: 69
Discharge: HOME | End: 2024-02-05
Attending: INTERNAL MEDICINE
Payer: MEDICARE

## 2024-02-05 DIAGNOSIS — J44.1 ACUTE EXACERBATION OF CHRONIC OBSTRUCTIVE PULMONARY DISEASE (COPD) (HCC): ICD-10-CM

## 2024-02-05 PROCEDURE — 94729 DIFFUSING CAPACITY: CPT | Performed by: STUDENT IN AN ORGANIZED HEALTH CARE EDUCATION/TRAINING PROGRAM

## 2024-02-05 PROCEDURE — 94726 PLETHYSMOGRAPHY LUNG VOLUMES: CPT | Performed by: STUDENT IN AN ORGANIZED HEALTH CARE EDUCATION/TRAINING PROGRAM

## 2024-02-05 PROCEDURE — 94760 N-INVAS EAR/PLS OXIMETRY 1: CPT

## 2024-02-05 PROCEDURE — 94060 EVALUATION OF WHEEZING: CPT | Performed by: STUDENT IN AN ORGANIZED HEALTH CARE EDUCATION/TRAINING PROGRAM

## 2024-02-05 PROCEDURE — 94060 EVALUATION OF WHEEZING: CPT

## 2024-02-05 PROCEDURE — 94726 PLETHYSMOGRAPHY LUNG VOLUMES: CPT

## 2024-02-05 PROCEDURE — 94729 DIFFUSING CAPACITY: CPT

## 2024-02-05 RX ORDER — ALBUTEROL SULFATE 2.5 MG/3ML
2.5 SOLUTION RESPIRATORY (INHALATION) ONCE
Status: COMPLETED | OUTPATIENT
Start: 2024-02-05 | End: 2024-02-05

## 2024-02-05 RX ADMIN — ALBUTEROL SULFATE 2.5 MG: 2.5 SOLUTION RESPIRATORY (INHALATION) at 08:30

## 2024-02-08 ENCOUNTER — HOSPITAL ENCOUNTER (OUTPATIENT)
Dept: ULTRASOUND IMAGING | Facility: HOSPITAL | Age: 69
Discharge: HOME | End: 2024-02-08
Attending: INTERNAL MEDICINE
Payer: MEDICARE

## 2024-02-08 DIAGNOSIS — R22.1 LOCALIZED SWELLING, MASS AND LUMP, NECK: ICD-10-CM

## 2024-02-08 PROCEDURE — 76536 US EXAM OF HEAD AND NECK: CPT

## 2024-02-21 PROBLEM — Z12.39 BREAST CANCER SCREENING: Status: RESOLVED | Noted: 2020-08-26 | Resolved: 2024-02-21

## 2024-04-05 DIAGNOSIS — E78.2 MIXED HYPERLIPIDEMIA: ICD-10-CM

## 2024-04-05 RX ORDER — ROSUVASTATIN CALCIUM 10 MG/1
10 TABLET, COATED ORAL DAILY
Qty: 90 TABLET | Refills: 3 | Status: SHIPPED | OUTPATIENT
Start: 2024-04-05

## 2024-07-11 DIAGNOSIS — F32.9 REACTIVE DEPRESSION: ICD-10-CM

## 2024-07-11 RX ORDER — VENLAFAXINE HYDROCHLORIDE 75 MG/1
75 CAPSULE, EXTENDED RELEASE ORAL
Qty: 90 CAPSULE | Refills: 1 | Status: SHIPPED | OUTPATIENT
Start: 2024-07-11

## 2024-07-11 NOTE — TELEPHONE ENCOUNTER
Reason for call:   [x] Refill   [] Prior Auth  [] Other:     Office:   [x] PCP/Provider -   [] Specialty/Provider -     Medication: venlafaxine (EFFEXOR-XR) 75 mg 24 hr capsule Take 1 capsule (75 mg total) by mouth daily with breakfast       Pharmacy: [facility] #[id number] - [place], [place] - [address]     Does the patient have enough for 3 days?   [x] Yes   [] No - Send as HP to POD

## 2024-07-29 DIAGNOSIS — I10 ESSENTIAL HYPERTENSION: ICD-10-CM

## 2024-07-30 RX ORDER — LOSARTAN POTASSIUM 100 MG/1
100 TABLET ORAL DAILY
Qty: 30 TABLET | Refills: 0 | Status: SHIPPED | OUTPATIENT
Start: 2024-07-30

## 2024-07-30 RX ORDER — AMLODIPINE BESYLATE 5 MG/1
5 TABLET ORAL DAILY
Qty: 30 TABLET | Refills: 0 | Status: SHIPPED | OUTPATIENT
Start: 2024-07-30

## 2024-08-22 DIAGNOSIS — I10 ESSENTIAL HYPERTENSION: ICD-10-CM

## 2024-08-24 RX ORDER — LOSARTAN POTASSIUM 100 MG/1
100 TABLET ORAL DAILY
Qty: 30 TABLET | Refills: 0 | Status: SHIPPED | OUTPATIENT
Start: 2024-08-24

## 2024-08-24 RX ORDER — AMLODIPINE BESYLATE 5 MG/1
5 TABLET ORAL DAILY
Qty: 30 TABLET | Refills: 0 | Status: SHIPPED | OUTPATIENT
Start: 2024-08-24

## 2024-09-12 DIAGNOSIS — Z23 ENCOUNTER FOR IMMUNIZATION: ICD-10-CM

## 2024-09-12 DIAGNOSIS — J45.30 MILD PERSISTENT REACTIVE AIRWAY DISEASE WITHOUT COMPLICATION: ICD-10-CM

## 2024-09-12 DIAGNOSIS — J44.1 ACUTE EXACERBATION OF CHRONIC OBSTRUCTIVE PULMONARY DISEASE (COPD) (HCC): ICD-10-CM

## 2024-09-12 NOTE — TELEPHONE ENCOUNTER
Medication: fluticasone-umeclidinium-vilanterol     Dose/Frequency: 200-62.5-25 mcg/actuation, inhale 1 puff daily    Quantity: 30 days    Pharmacy: [id number] - [place], [place] - [address]    Office:   [x] PCP/Provider -   [] Speciality/Provider -     Does the patient have enough for 3 days?   [] Yes   [x] No - Send as HP to POD

## 2024-09-22 DIAGNOSIS — I10 ESSENTIAL HYPERTENSION: ICD-10-CM

## 2024-09-23 RX ORDER — AMLODIPINE BESYLATE 5 MG/1
5 TABLET ORAL DAILY
Qty: 90 TABLET | Refills: 1 | Status: SHIPPED | OUTPATIENT
Start: 2024-09-23

## 2024-09-23 RX ORDER — LOSARTAN POTASSIUM 100 MG/1
100 TABLET ORAL DAILY
Qty: 90 TABLET | Refills: 1 | Status: SHIPPED | OUTPATIENT
Start: 2024-09-23

## 2024-09-25 ENCOUNTER — OFFICE VISIT (OUTPATIENT)
Dept: INTERNAL MEDICINE CLINIC | Age: 69
End: 2024-09-25
Payer: MEDICARE

## 2024-09-25 VITALS
SYSTOLIC BLOOD PRESSURE: 136 MMHG | DIASTOLIC BLOOD PRESSURE: 84 MMHG | WEIGHT: 149.6 LBS | BODY MASS INDEX: 23.78 KG/M2 | OXYGEN SATURATION: 98 % | HEART RATE: 83 BPM | TEMPERATURE: 98.8 F

## 2024-09-25 DIAGNOSIS — E78.49 OTHER HYPERLIPIDEMIA: ICD-10-CM

## 2024-09-25 DIAGNOSIS — J45.20 MILD INTERMITTENT REACTIVE AIRWAY DISEASE WITHOUT COMPLICATION: ICD-10-CM

## 2024-09-25 DIAGNOSIS — Z12.31 ENCOUNTER FOR SCREENING MAMMOGRAM FOR BREAST CANCER: ICD-10-CM

## 2024-09-25 DIAGNOSIS — J20.8 ACUTE BRONCHITIS DUE TO OTHER SPECIFIED ORGANISMS: ICD-10-CM

## 2024-09-25 DIAGNOSIS — F32.9 REACTIVE DEPRESSION: ICD-10-CM

## 2024-09-25 DIAGNOSIS — R73.03 PREDIABETES: ICD-10-CM

## 2024-09-25 DIAGNOSIS — I10 ESSENTIAL HYPERTENSION: Primary | ICD-10-CM

## 2024-09-25 DIAGNOSIS — D58.2 ELEVATED HEMOGLOBIN (HCC): ICD-10-CM

## 2024-09-25 DIAGNOSIS — R09.89 CHEST RALES: ICD-10-CM

## 2024-09-25 PROBLEM — J44.1 ACUTE EXACERBATION OF CHRONIC OBSTRUCTIVE PULMONARY DISEASE (COPD) (HCC): Status: RESOLVED | Noted: 2023-09-12 | Resolved: 2024-09-25

## 2024-09-25 PROCEDURE — 99214 OFFICE O/P EST MOD 30 MIN: CPT | Performed by: INTERNAL MEDICINE

## 2024-09-25 PROCEDURE — G2211 COMPLEX E/M VISIT ADD ON: HCPCS | Performed by: INTERNAL MEDICINE

## 2024-09-25 RX ORDER — AZITHROMYCIN 250 MG/1
TABLET, FILM COATED ORAL
Qty: 6 TABLET | Refills: 0 | Status: SHIPPED | OUTPATIENT
Start: 2024-09-25 | End: 2024-09-29

## 2024-09-25 NOTE — PROGRESS NOTES
Assessment/Plan:    Acute bronchitis with chest Rales  - likely viral with bacterial superinfection vs bacterial  - we will start pt on azithromycin 500 mg today and then 250 mg daily for 4 days,  and over-the-counter Mucinex for productive cough  -Will order a chest x-ray especially with persistent rales in the right lung base which were not cleared by coughing.  - Pt was counseled to use OTC tylenol or ibuprofen with meals for any aches and pains, warm salt water gargles for any sore throat and was encouraged to drink lots of fluids, get plenty of rest and wash her hands liberally.  - pt was also counseled to take antibiotics with food and also to use a probiotic like yogurt daily as long as she is taking antibiotics  - She should return to the clinic or go to the emergency department if her symptoms worsen or do not improve.  -She was encouraged to try and quit smoking marijuana.    Essential hypertension   - blood pressure is not optimally controlled likely secondary to dietary noncompliance and current stress and acute illness  -Will hold off on adjusting antihypertensive medications at this time and work on dietary compliance first.  -continue with losartan and amlodipine  -She was encouraged to start a low-salt diet and continue with exercise as much as possible.    Reactive airway disease   - stable without acute exacerbation  - continue with Trelegy inhaler and p.r.n. albuterol inhaler    Depression  -Fairly well controlled   - continue with venlafaxine    Prediabetes  -Most recent hemoglobin A1c on 8/22/2023 was 6.4, up from 5.8  -Try to stick with a diet low in carbohydrates and simple sugars and exercise as much as possible  -Patient was urged to get her labs done    Hyperlipidemia  -Lipids are not well controlled and incidentally patient stopped taking her rosuvastatin because of possible side effects.  - last lipid panel done on 8/23/2023, showed a total cholesterol of 213,  A triglyceride of 86, and  HDL of 60 and an LDL of 136  -continue with a heart healthy diet  -Patient was urged to get her lipid panel done especially since she stopped taking her rosuvastatin, so that we can monitor updated lipid panel.  -We will reprint that the ordered labs and she was encouraged to get them done ASAP.    Elevated hemoglobin  -Most recent CBC done on 8/23/2023 showed a hemoglobin of 17.7, up from 16.1  -Repeat CBC was ordered on 1/26/2024 and patient was encouraged to get it done but has not yet done it  -Will repeat labs today and patient was urged to get her labs so that we can monitor stability of elevated hemoglobin.     Diagnoses and all orders for this visit:    Essential hypertension    Encounter for screening mammogram for breast cancer  -     Mammo screening bilateral w 3d and cad; Future    Mild intermittent reactive airway disease without complication    Reactive depression    Elevated hemoglobin (HCC)    Prediabetes    Other hyperlipidemia    Chest rales  -     XR chest pa and lateral; Future    Acute bronchitis due to other specified organisms  -     XR chest pa and lateral; Future  -     azithromycin (ZITHROMAX) 250 mg tablet; Take 2 tablets today then 1 tablet daily x 4 days             Subjective:      Patient ID: Kenzie Al is a 68 y.o. female.    HPI    Patient presents for a follow-up visit regarding her hypertension, reactive airway disease, depression, elevated hemoglobin, prediabetes, hyperlipidemia.  She states that she has been taking her medications as prescribed.  Today pt  states that she does not check at home regularly but only occasionally and usually gets values around 120s/80s-90s sometimes.  She admits that she has been under stress recently since 6 months ago.  She states that she was a bit harried on coming in today because she did not want to be late for her appointment.  She states that she is not watching her salt intake and did not know that she was supposed to be watching  it.  She drinks 2 cups of herbal tea with low cafe  Sleeping well and denies any pain   She states that her mood is okay -she has ups and downs - has issues with her kid who is on the autism spectrum  She is currently having to help her son do multiple things that he should have been doing for himself ordinarily.  Asthma is very well-controlled and she never needs to use her rescue inhaler but uses the Trelegy every day.  Stopped taking the crestor  immediately after she started taking cos of body aches with stiffness.  She states that her sister has similar side effects to her statin as well.  Symptoms stopped when she stopped takig the crestor  Of note, she has not had her labs done that were ordered 9 months ago.  No family hx of mi or cva  She states that she still smokes a couple of cavazos of marijuana every other day  She states that she had a likely viral infection about a month ago and her cough has remained persistent and worse in the morning.  It is occasionally productive of off-white colored phlegm and she has associated shortness of breath on exertion.  She admits to occasional diaphoresis but denies any runny nose, stuffy nose, sinus pain or pressure, postnasal drip, headache, dizziness, wheezing, palpitations.  She admits to occasional constipation but admits that she does not drink enough water.    The following portions of the patient's history were reviewed and updated as appropriate: She  has a past medical history of Asthma, Depression, History of transfusion, Hypertension, and PONV (postoperative nausea and vomiting).  She   Patient Active Problem List    Diagnosis Date Noted    Elevated hemoglobin (HCC) 01/26/2024    Arrhythmia 08/21/2023    Tachycardia 08/21/2023    Mild intermittent asthma with acute exacerbation 08/21/2023    Chronic cough 07/17/2023    Reactive airway disease without complication 07/15/2022    Influenza vaccination declined by patient 12/09/2021    Nail abnormalities  09/09/2021    Reactive depression 09/09/2021    Medication side effect, initial encounter 09/09/2021    Dyspnea on exertion 06/02/2021    Hyponatremia 06/02/2021    Other hyperlipidemia 05/05/2021    Prediabetes 05/05/2021    Aftercare following surgery 10/08/2020    Essential hypertension 08/27/2020    Closed fracture of olecranon process of right ulna 08/27/2020    Pain and swelling of upper extremity, right 08/26/2020    Bruising of multiple areas 08/26/2020    Right elbow pain 08/26/2020    Encounter for screening for lung cancer 08/26/2020     She  has a past surgical history that includes Bunionectomy; Tubal ligation; ELBOW FRACTURE REPAIR (Right, 08/31/2020); and Fracture surgery.  Her family history includes Hypertension in her mother; Kidney cancer in her father; Lung cancer (age of onset: 49) in her father; Melanoma (age of onset: 18) in her sister; No Known Problems in her brother, maternal aunt, maternal aunt, maternal aunt, maternal aunt, maternal aunt, maternal aunt, maternal aunt, maternal aunt, maternal grandfather, maternal grandmother, paternal aunt, paternal aunt, paternal grandfather, and paternal grandmother; Parkinsonism in her mother.  She  reports that she quit smoking about 24 years ago. Her smoking use included cigarettes. She started smoking about 44 years ago. She has a 30 pack-year smoking history. She has never used smokeless tobacco. She reports current alcohol use of about 7.0 standard drinks of alcohol per week. She reports that she does not currently use drugs after having used the following drugs: Marijuana. Frequency: 7.00 times per week.  Current Outpatient Medications   Medication Sig Dispense Refill    albuterol (PROVENTIL HFA,VENTOLIN HFA) 90 mcg/act inhaler Inhale 2 puffs every 6 (six) hours as needed for wheezing or shortness of breath 8 g 5    amLODIPine (NORVASC) 5 mg tablet TAKE 1 TABLET (5 MG TOTAL) BY MOUTH DAILY. 90 tablet 1    azithromycin (ZITHROMAX) 250 mg tablet  Take 2 tablets today then 1 tablet daily x 4 days 6 tablet 0    clobetasol (TEMOVATE) 0.05 % cream as needed      fluticasone-umeclidinium-vilanterol 200-62.5-25 mcg/actuation AEPB inhaler Inhale 1 puff daily Rinse mouth after use. 1 each 5    losartan (COZAAR) 100 MG tablet TAKE 1 TABLET BY MOUTH EVERY DAY 90 tablet 1    multivitamin (THERAGRAN) TABS Take 1 tablet by mouth daily      venlafaxine (EFFEXOR-XR) 75 mg 24 hr capsule Take 1 capsule (75 mg total) by mouth daily with breakfast 90 capsule 1     No current facility-administered medications for this visit.     Current Outpatient Medications on File Prior to Visit   Medication Sig    albuterol (PROVENTIL HFA,VENTOLIN HFA) 90 mcg/act inhaler Inhale 2 puffs every 6 (six) hours as needed for wheezing or shortness of breath    amLODIPine (NORVASC) 5 mg tablet TAKE 1 TABLET (5 MG TOTAL) BY MOUTH DAILY.    clobetasol (TEMOVATE) 0.05 % cream as needed    fluticasone-umeclidinium-vilanterol 200-62.5-25 mcg/actuation AEPB inhaler Inhale 1 puff daily Rinse mouth after use.    losartan (COZAAR) 100 MG tablet TAKE 1 TABLET BY MOUTH EVERY DAY    multivitamin (THERAGRAN) TABS Take 1 tablet by mouth daily    venlafaxine (EFFEXOR-XR) 75 mg 24 hr capsule Take 1 capsule (75 mg total) by mouth daily with breakfast    [DISCONTINUED] rosuvastatin (CRESTOR) 10 MG tablet Take 1 tablet (10 mg total) by mouth daily (Patient not taking: Reported on 9/25/2024)     No current facility-administered medications on file prior to visit.     She has No Known Allergies..    Review of Systems   Constitutional:  Positive for diaphoresis (occasionally). Negative for activity change, chills, fatigue, fever and unexpected weight change.   HENT:  Negative for ear pain, postnasal drip, rhinorrhea, sinus pressure and sore throat.    Eyes:  Negative for pain.   Respiratory:  Positive for cough (productive in the am - off white) and shortness of breath (on exertion sometimes). Negative for choking,  chest tightness and wheezing.    Cardiovascular:  Negative for chest pain, palpitations and leg swelling.   Gastrointestinal:  Positive for constipation (occasionally - does not drink enough water). Negative for abdominal pain, diarrhea, nausea and vomiting.   Genitourinary:  Negative for dysuria and hematuria.   Musculoskeletal:  Positive for arthralgias (hands devika). Negative for back pain, gait problem, joint swelling, myalgias and neck stiffness.   Skin:  Negative for pallor and rash.   Neurological:  Negative for dizziness, tremors, seizures, syncope, light-headedness and headaches.   Hematological:  Negative for adenopathy.   Psychiatric/Behavioral:  Positive for dysphoric mood (occasionally much better). Negative for behavioral problems. The patient is nervous/anxious (Occasionally especially with current stressors).          Objective:      /84 (BP Location: Left arm, Patient Position: Sitting, Cuff Size: Adult)   Pulse 83   Temp 98.8 °F (37.1 °C) (Temporal)   Wt 67.9 kg (149 lb 9.6 oz)   SpO2 98% Comment: ra  BMI 23.78 kg/m²          Physical Exam  Constitutional:       General: She is not in acute distress.     Appearance: She is well-developed. She is not diaphoretic.   HENT:      Head: Normocephalic and atraumatic.      Right Ear: External ear normal.      Left Ear: External ear normal.      Nose: Nose normal.      Mouth/Throat:      Mouth: Mucous membranes are dry.      Pharynx: Posterior oropharyngeal erythema present. No oropharyngeal exudate.   Eyes:      General: No scleral icterus.        Right eye: No discharge.         Left eye: No discharge.      Conjunctiva/sclera: Conjunctivae normal.      Pupils: Pupils are equal, round, and reactive to light.   Neck:      Thyroid: No thyromegaly.      Vascular: No JVD.      Trachea: No tracheal deviation.   Cardiovascular:      Rate and Rhythm: Normal rate and regular rhythm.      Heart sounds: Normal heart sounds. No murmur heard.     No friction  rub. No gallop.   Pulmonary:      Effort: Pulmonary effort is normal. No respiratory distress.      Breath sounds: Examination of the right-lower field reveals rales. Rales (rales in right lung base - not cleared by coughing) present. No wheezing.   Chest:      Chest wall: No tenderness.   Abdominal:      General: Bowel sounds are normal. There is no distension.      Palpations: Abdomen is soft. There is no mass.      Tenderness: There is no abdominal tenderness. There is no guarding or rebound.   Musculoskeletal:         General: No tenderness or deformity. Normal range of motion.      Cervical back: Normal range of motion and neck supple.   Lymphadenopathy:      Cervical: No cervical adenopathy.   Skin:     General: Skin is warm and dry.      Coloration: Skin is not pale.      Findings: No erythema or rash.   Neurological:      Mental Status: She is alert and oriented to person, place, and time.      Cranial Nerves: No cranial nerve deficit.      Motor: No abnormal muscle tone.      Coordination: Coordination normal.      Deep Tendon Reflexes: Reflexes are normal and symmetric.   Psychiatric:         Behavior: Behavior normal.           Hospital Outpatient Visit on 01/16/2024   Component Date Value Ref Range Status    BSA 01/16/2024 1.79  m2 Final    A4C EF 01/16/2024 70  % Final    LVOT stroke volume 01/16/2024 55.04   Final    LVOT stroke volume index 01/16/2024 30.20  ml/m2 Final    LVOT Cardiac Output 01/16/2024 4.37  l/min Final    LVOT Cardiac Index 01/16/2024 2.44  l/min/m2 Final    LVIDd 01/16/2024 4.20  cm Final    LVIDS 01/16/2024 3.10  cm Final    IVSd 01/16/2024 0.80  cm Final    LVPWd 01/16/2024 0.80  cm Final    LVOT diameter 01/16/2024 1.7  cm Final    LVOT peak VTI 01/16/2024 24.26  cm Final    FS 01/16/2024 26  28 - 44 Final    MV E' Tissue Velocity Septal 01/16/2024 7  cm/s Final    MV E' Tissue Velocity Lateral 01/16/2024 7  cm/s Final    LA Volume Index (BP) 01/16/2024 19.0  mL/m2 Final    E/A  ratio 01/16/2024 0.73   Final    E wave deceleration time 01/16/2024 224  ms Final    MV Peak E Syed 01/16/2024 80  cm/s Final    MV Peak A Syed 01/16/2024 1.1  m/s Final    AV LVOT peak gradient 01/16/2024 5  mmHg Final    LVOT peak syed 01/16/2024 1.14  m/s Final    RVID d 01/16/2024 3.5  cm Final    Tricuspid annular plane systolic e* 01/16/2024 2.30  cm Final    LA size 01/16/2024 3.4  cm Final    LA length (A2C) 01/16/2024 5.30  cm Final    LA volume (BP) 01/16/2024 34  mL Final    RAA A4C 01/16/2024 13.5  cm2 Final    Aortic valve peak velocity 01/16/2024 1.45  m/s Final    Ao VTI 01/16/2024 31.03  cm Final    AV mean gradient 01/16/2024 5  mmHg Final    LVOT mn grad 01/16/2024 3.0  mmHg Final    AV peak gradient 01/16/2024 8  mmHg Final    AV area by cont VTI 01/16/2024 1.8  cm2 Final    AV area peak syed 01/16/2024 1.8  cm2 Final    Ao root 01/16/2024 3.10  cm Final    Asc Ao 01/16/2024 2.9  cm Final    Aortic valve mean velocity 01/16/2024 9.90  m/s Final    Left ventricular stroke volume (2D) 01/16/2024 37.00  mL Final    IVS 01/16/2024 0.8  cm Final    LEFT VENTRICLE SYSTOLIC VOLUME (MO* 01/16/2024 39  mL Final    LV DIASTOLIC VOLUME (MOD BIPLANE) * 01/16/2024 77  mL Final    Left Atrium Area-systolic Four Josie* 01/16/2024 11.5  cm2 Final    Left Atrium Area-systolic Apical T* 01/16/2024 16.4  cm2 Final    LVSV, 2D 01/16/2024 37  mL Final    LVOT area 01/16/2024 2.27  cm2 Final    DVI 01/16/2024 0.79   Final    AV valve area 01/16/2024 1.77  cm2 Final    LV EF 01/16/2024 70   Final    GLS 01/16/2024 -18  % Final    Est. RA pres 01/16/2024 3.0  mmHg Final

## 2025-01-05 DIAGNOSIS — F32.9 REACTIVE DEPRESSION: ICD-10-CM

## 2025-01-05 RX ORDER — VENLAFAXINE HYDROCHLORIDE 75 MG/1
CAPSULE, EXTENDED RELEASE ORAL
Qty: 90 CAPSULE | Refills: 1 | Status: SHIPPED | OUTPATIENT
Start: 2025-01-05

## 2025-03-08 DIAGNOSIS — J45.30 MILD PERSISTENT REACTIVE AIRWAY DISEASE WITHOUT COMPLICATION: ICD-10-CM

## 2025-03-08 DIAGNOSIS — Z23 ENCOUNTER FOR IMMUNIZATION: ICD-10-CM

## 2025-03-08 DIAGNOSIS — J44.1 ACUTE EXACERBATION OF CHRONIC OBSTRUCTIVE PULMONARY DISEASE (COPD) (HCC): ICD-10-CM

## 2025-03-10 ENCOUNTER — RA CDI HCC (OUTPATIENT)
Dept: OTHER | Facility: HOSPITAL | Age: 70
End: 2025-03-10

## 2025-03-10 PROBLEM — Z12.2 ENCOUNTER FOR SCREENING FOR LUNG CANCER: Status: RESOLVED | Noted: 2020-08-26 | Resolved: 2025-03-10

## 2025-03-10 PROBLEM — Z28.21 INFLUENZA VACCINATION DECLINED BY PATIENT: Status: RESOLVED | Noted: 2021-12-09 | Resolved: 2025-03-10

## 2025-03-11 RX ORDER — FLUTICASONE FUROATE, UMECLIDINIUM BROMIDE AND VILANTEROL TRIFENATATE 200; 62.5; 25 UG/1; UG/1; UG/1
POWDER RESPIRATORY (INHALATION)
Qty: 60 EACH | Refills: 5 | Status: SHIPPED | OUTPATIENT
Start: 2025-03-11

## 2025-03-12 ENCOUNTER — APPOINTMENT (OUTPATIENT)
Dept: LAB | Age: 70
End: 2025-03-12
Payer: MEDICARE

## 2025-03-12 DIAGNOSIS — F32.9 REACTIVE DEPRESSION: ICD-10-CM

## 2025-03-12 DIAGNOSIS — R09.89 CHEST RALES: ICD-10-CM

## 2025-03-12 DIAGNOSIS — R73.03 PREDIABETES: ICD-10-CM

## 2025-03-12 DIAGNOSIS — D58.2 ELEVATED HEMOGLOBIN (HCC): ICD-10-CM

## 2025-03-12 DIAGNOSIS — E78.49 OTHER HYPERLIPIDEMIA: ICD-10-CM

## 2025-03-12 DIAGNOSIS — J45.20 MILD INTERMITTENT REACTIVE AIRWAY DISEASE WITHOUT COMPLICATION: ICD-10-CM

## 2025-03-12 DIAGNOSIS — I10 ESSENTIAL HYPERTENSION: ICD-10-CM

## 2025-03-12 LAB
ALBUMIN SERPL BCG-MCNC: 4.4 G/DL (ref 3.5–5)
ALP SERPL-CCNC: 94 U/L (ref 34–104)
ALT SERPL W P-5'-P-CCNC: 16 U/L (ref 7–52)
ANION GAP SERPL CALCULATED.3IONS-SCNC: 9 MMOL/L (ref 4–13)
AST SERPL W P-5'-P-CCNC: 15 U/L (ref 13–39)
BASOPHILS # BLD AUTO: 0.11 THOUSANDS/ÂΜL (ref 0–0.1)
BASOPHILS NFR BLD AUTO: 1 % (ref 0–1)
BILIRUB SERPL-MCNC: 0.7 MG/DL (ref 0.2–1)
BUN SERPL-MCNC: 12 MG/DL (ref 5–25)
CALCIUM SERPL-MCNC: 9.6 MG/DL (ref 8.4–10.2)
CHLORIDE SERPL-SCNC: 101 MMOL/L (ref 96–108)
CHOLEST SERPL-MCNC: 263 MG/DL (ref ?–200)
CO2 SERPL-SCNC: 28 MMOL/L (ref 21–32)
CREAT SERPL-MCNC: 0.75 MG/DL (ref 0.6–1.3)
EOSINOPHIL # BLD AUTO: 0.47 THOUSAND/ÂΜL (ref 0–0.61)
EOSINOPHIL NFR BLD AUTO: 6 % (ref 0–6)
ERYTHROCYTE [DISTWIDTH] IN BLOOD BY AUTOMATED COUNT: 12.6 % (ref 11.6–15.1)
EST. AVERAGE GLUCOSE BLD GHB EST-MCNC: 137 MG/DL
GFR SERPL CREATININE-BSD FRML MDRD: 82 ML/MIN/1.73SQ M
GLUCOSE P FAST SERPL-MCNC: 152 MG/DL (ref 65–99)
HBA1C MFR BLD: 6.4 %
HCT VFR BLD AUTO: 43.5 % (ref 34.8–46.1)
HDLC SERPL-MCNC: 64 MG/DL
HGB BLD-MCNC: 14.5 G/DL (ref 11.5–15.4)
IMM GRANULOCYTES # BLD AUTO: 0.02 THOUSAND/UL (ref 0–0.2)
IMM GRANULOCYTES NFR BLD AUTO: 0 % (ref 0–2)
LDLC SERPL CALC-MCNC: 183 MG/DL (ref 0–100)
LYMPHOCYTES # BLD AUTO: 2.62 THOUSANDS/ÂΜL (ref 0.6–4.47)
LYMPHOCYTES NFR BLD AUTO: 32 % (ref 14–44)
MCH RBC QN AUTO: 30.5 PG (ref 26.8–34.3)
MCHC RBC AUTO-ENTMCNC: 33.3 G/DL (ref 31.4–37.4)
MCV RBC AUTO: 91 FL (ref 82–98)
MONOCYTES # BLD AUTO: 0.75 THOUSAND/ÂΜL (ref 0.17–1.22)
MONOCYTES NFR BLD AUTO: 9 % (ref 4–12)
NEUTROPHILS # BLD AUTO: 4.25 THOUSANDS/ÂΜL (ref 1.85–7.62)
NEUTS SEG NFR BLD AUTO: 52 % (ref 43–75)
NONHDLC SERPL-MCNC: 199 MG/DL
NRBC BLD AUTO-RTO: 0 /100 WBCS
PLATELET # BLD AUTO: 172 THOUSANDS/UL (ref 149–390)
PMV BLD AUTO: 11.4 FL (ref 8.9–12.7)
POTASSIUM SERPL-SCNC: 4.1 MMOL/L (ref 3.5–5.3)
PROT SERPL-MCNC: 7 G/DL (ref 6.4–8.4)
RBC # BLD AUTO: 4.76 MILLION/UL (ref 3.81–5.12)
SODIUM SERPL-SCNC: 138 MMOL/L (ref 135–147)
TRIGL SERPL-MCNC: 79 MG/DL (ref ?–150)
WBC # BLD AUTO: 8.22 THOUSAND/UL (ref 4.31–10.16)

## 2025-03-12 PROCEDURE — 80053 COMPREHEN METABOLIC PANEL: CPT

## 2025-03-12 PROCEDURE — 80061 LIPID PANEL: CPT

## 2025-03-12 PROCEDURE — 36415 COLL VENOUS BLD VENIPUNCTURE: CPT

## 2025-03-12 PROCEDURE — 83036 HEMOGLOBIN GLYCOSYLATED A1C: CPT

## 2025-03-12 PROCEDURE — 85025 COMPLETE CBC W/AUTO DIFF WBC: CPT

## 2025-03-14 ENCOUNTER — OFFICE VISIT (OUTPATIENT)
Dept: INTERNAL MEDICINE CLINIC | Age: 70
End: 2025-03-14
Payer: MEDICARE

## 2025-03-14 VITALS
DIASTOLIC BLOOD PRESSURE: 80 MMHG | TEMPERATURE: 99.2 F | BODY MASS INDEX: 23.45 KG/M2 | HEIGHT: 67 IN | OXYGEN SATURATION: 96 % | RESPIRATION RATE: 18 BRPM | WEIGHT: 149.4 LBS | HEART RATE: 71 BPM | SYSTOLIC BLOOD PRESSURE: 138 MMHG

## 2025-03-14 DIAGNOSIS — I10 ESSENTIAL HYPERTENSION: ICD-10-CM

## 2025-03-14 DIAGNOSIS — J45.20 MILD INTERMITTENT REACTIVE AIRWAY DISEASE WITHOUT COMPLICATION: ICD-10-CM

## 2025-03-14 DIAGNOSIS — Z00.00 MEDICARE ANNUAL WELLNESS VISIT, SUBSEQUENT: Primary | ICD-10-CM

## 2025-03-14 DIAGNOSIS — F12.90 MARIJUANA USE: ICD-10-CM

## 2025-03-14 DIAGNOSIS — Z12.31 ENCOUNTER FOR SCREENING MAMMOGRAM FOR BREAST CANCER: ICD-10-CM

## 2025-03-14 DIAGNOSIS — Z12.12 SCREENING FOR COLORECTAL CANCER: ICD-10-CM

## 2025-03-14 DIAGNOSIS — R73.03 PREDIABETES: ICD-10-CM

## 2025-03-14 DIAGNOSIS — M85.88 OSTEOPENIA OF OTHER SITE: ICD-10-CM

## 2025-03-14 DIAGNOSIS — Z12.11 SCREENING FOR COLORECTAL CANCER: ICD-10-CM

## 2025-03-14 DIAGNOSIS — Z23 ENCOUNTER FOR IMMUNIZATION: ICD-10-CM

## 2025-03-14 DIAGNOSIS — E78.49 OTHER HYPERLIPIDEMIA: ICD-10-CM

## 2025-03-14 DIAGNOSIS — F32.9 REACTIVE DEPRESSION: ICD-10-CM

## 2025-03-14 DIAGNOSIS — Z79.899 ON STATIN THERAPY: ICD-10-CM

## 2025-03-14 PROCEDURE — 99214 OFFICE O/P EST MOD 30 MIN: CPT | Performed by: INTERNAL MEDICINE

## 2025-03-14 PROCEDURE — G0439 PPPS, SUBSEQ VISIT: HCPCS | Performed by: INTERNAL MEDICINE

## 2025-03-14 RX ORDER — PITAVASTATIN CALCIUM 2.09 MG/1
2 TABLET, FILM COATED ORAL
Qty: 30 TABLET | Refills: 0 | Status: SHIPPED | OUTPATIENT
Start: 2025-03-14 | End: 2025-04-13

## 2025-03-14 NOTE — ASSESSMENT & PLAN NOTE
Orders:  •  pitavastatin (LIVALO) 2 mg; Take 1 tablet (2 mg total) by mouth daily with dinner  •  Hepatic function panel; Future  •  Lipid panel; Future

## 2025-03-14 NOTE — PATIENT INSTRUCTIONS
Medicare Preventive Visit Patient Instructions  Thank you for completing your Welcome to Medicare Visit or Medicare Annual Wellness Visit today. Your next wellness visit will be due in one year (3/15/2026).  The screening/preventive services that you may require over the next 5-10 years are detailed below. Some tests may not apply to you based off risk factors and/or age. Screening tests ordered at today's visit but not completed yet may show as past due. Also, please note that scanned in results may not display below.  Preventive Screenings:  Service Recommendations Previous Testing/Comments   Colorectal Cancer Screening  * Colonoscopy    * Fecal Occult Blood Test (FOBT)/Fecal Immunochemical Test (FIT)  * Fecal DNA/Cologuard Test  * Flexible Sigmoidoscopy Age: 45-75 years old   Colonoscopy: every 10 years (may be performed more frequently if at higher risk)  OR  FOBT/FIT: every 1 year  OR  Cologuard: every 3 years  OR  Sigmoidoscopy: every 5 years  Screening may be recommended earlier than age 45 if at higher risk for colorectal cancer. Also, an individualized decision between you and your healthcare provider will decide whether screening between the ages of 76-85 would be appropriate. Colonoscopy: Not on file  FOBT/FIT: Not on file  Cologuard: 07/20/2022  Sigmoidoscopy: Not on file    Screening Current     Breast Cancer Screening Age: 40+ years old  Frequency: every 1-2 years  Not required if history of left and right mastectomy Mammogram: 12/07/2023    Screening Current   Cervical Cancer Screening Between the ages of 21-29, pap smear recommended once every 3 years.   Between the ages of 30-65, can perform pap smear with HPV co-testing every 5 years.   Recommendations may differ for women with a history of total hysterectomy, cervical cancer, or abnormal pap smears in past. Pap Smear: Not on file    Screening Not Indicated   Hepatitis C Screening Once for adults born between 1945 and 1965  More frequently in  patients at high risk for Hepatitis C Hep C Antibody: 05/19/2021    Screening Current   Diabetes Screening 1-2 times per year if you're at risk for diabetes or have pre-diabetes Fasting glucose: 152 mg/dL (3/12/2025)  A1C: 6.4 % (3/12/2025)  Screening Current   Cholesterol Screening Once every 5 years if you don't have a lipid disorder. May order more often based on risk factors. Lipid panel: 03/12/2025    Screening Not Indicated  History Lipid Disorder     Other Preventive Screenings Covered by Medicare:  Abdominal Aortic Aneurysm (AAA) Screening: covered once if your at risk. You're considered to be at risk if you have a family history of AAA.  Lung Cancer Screening: covers low dose CT scan once per year if you meet all of the following conditions: (1) Age 55-77; (2) No signs or symptoms of lung cancer; (3) Current smoker or have quit smoking within the last 15 years; (4) You have a tobacco smoking history of at least 20 pack years (packs per day multiplied by number of years you smoked); (5) You get a written order from a healthcare provider.  Glaucoma Screening: covered annually if you're considered high risk: (1) You have diabetes OR (2) Family history of glaucoma OR (3)  aged 50 and older OR (4)  American aged 65 and older  Osteoporosis Screening: covered every 2 years if you meet one of the following conditions: (1) You're estrogen deficient and at risk for osteoporosis based off medical history and other findings; (2) Have a vertebral abnormality; (3) On glucocorticoid therapy for more than 3 months; (4) Have primary hyperparathyroidism; (5) On osteoporosis medications and need to assess response to drug therapy.   Last bone density test (DXA Scan): 12/07/2023.  HIV Screening: covered annually if you're between the age of 15-65. Also covered annually if you are younger than 15 and older than 65 with risk factors for HIV infection. For pregnant patients, it is covered up to 3 times per  pregnancy.    Immunizations:  Immunization Recommendations   Influenza Vaccine Annual influenza vaccination during flu season is recommended for all persons aged >= 6 months who do not have contraindications   Pneumococcal Vaccine   * Pneumococcal conjugate vaccine = PCV13 (Prevnar 13), PCV15 (Vaxneuvance), PCV20 (Prevnar 20)  * Pneumococcal polysaccharide vaccine = PPSV23 (Pneumovax) Adults 19-65 yo with certain risk factors or if 65+ yo  If never received any pneumonia vaccine: recommend Prevnar 20 (PCV20)  Give PCV20 if previously received 1 dose of PCV13 or PPSV23   Hepatitis B Vaccine 3 dose series if at intermediate or high risk (ex: diabetes, end stage renal disease, liver disease)   Respiratory syncytial virus (RSV) Vaccine - COVERED BY MEDICARE PART D  * RSVPreF3 (Arexvy) CDC recommends that adults 60 years of age and older may receive a single dose of RSV vaccine using shared clinical decision-making (SCDM)   Tetanus (Td) Vaccine - COST NOT COVERED BY MEDICARE PART B Following completion of primary series, a booster dose should be given every 10 years to maintain immunity against tetanus. Td may also be given as tetanus wound prophylaxis.   Tdap Vaccine - COST NOT COVERED BY MEDICARE PART B Recommended at least once for all adults. For pregnant patients, recommended with each pregnancy.   Shingles Vaccine (Shingrix) - COST NOT COVERED BY MEDICARE PART B  2 shot series recommended in those 19 years and older who have or will have weakened immune systems or those 50 years and older     Health Maintenance Due:      Topic Date Due   • Breast Cancer Screening: Mammogram  12/07/2024   • Colorectal Cancer Screening  07/20/2025   • Hepatitis C Screening  Completed     Immunizations Due:      Topic Date Due   • Pneumococcal Vaccine: 65+ Years (2 of 2 - PCV) 05/06/2012   • COVID-19 Vaccine (4 - 2024-25 season) 09/01/2024     Advance Directives   What are advance directives?  Advance directives are legal documents  that state your wishes and plans for medical care. These plans are made ahead of time in case you lose your ability to make decisions for yourself. Advance directives can apply to any medical decision, such as the treatments you want, and if you want to donate organs.   What are the types of advance directives?  There are many types of advance directives, and each state has rules about how to use them. You may choose a combination of any of the following:  Living will:  This is a written record of the treatment you want. You can also choose which treatments you do not want, which to limit, and which to stop at a certain time. This includes surgery, medicine, IV fluid, and tube feedings.   Durable power of  for healthcare (DPAHC):  This is a written record that states who you want to make healthcare choices for you when you are unable to make them for yourself. This person, called a proxy, is usually a family member or a friend. You may choose more than 1 proxy.  Do not resuscitate (DNR) order:  A DNR order is used in case your heart stops beating or you stop breathing. It is a request not to have certain forms of treatment, such as CPR. A DNR order may be included in other types of advance directives.  Medical directive:  This covers the care that you want if you are in a coma, near death, or unable to make decisions for yourself. You can list the treatments you want for each condition. Treatment may include pain medicine, surgery, blood transfusions, dialysis, IV or tube feedings, and a ventilator (breathing machine).  Values history:  This document has questions about your views, beliefs, and how you feel and think about life. This information can help others choose the care that you would choose.  Why are advance directives important?  An advance directive helps you control your care. Although spoken wishes may be used, it is better to have your wishes written down. Spoken wishes can be misunderstood, or  not followed. Treatments may be given even if you do not want them. An advance directive may make it easier for your family to make difficult choices about your care.   Urinary Incontinence   Urinary incontinence (UI)  is when you lose control of your bladder. UI develops because your bladder cannot store or empty urine properly. The 3 most common types of UI are stress incontinence, urge incontinence, or both.  Medicines:   May be given to help strengthen your bladder control. Report any side effects of medication to your healthcare provider.  Do pelvic muscle exercises often:  Your pelvic muscles help you stop urinating. Squeeze these muscles tight for 5 seconds, then relax for 5 seconds. Gradually work up to squeezing for 10 seconds. Do 3 sets of 15 repetitions a day, or as directed. This will help strengthen your pelvic muscles and improve bladder control.  Train your bladder:  Go to the bathroom at set times, such as every 2 hours, even if you do not feel the urge to go. You can also try to hold your urine when you feel the urge to go. For example, hold your urine for 5 minutes when you feel the urge to go. As that becomes easier, hold your urine for 10 minutes.   Self-care:   Keep a UI record.  Write down how often you leak urine and how much you leak. Make a note of what you were doing when you leaked urine.  Drink liquids as directed. You may need to limit the amount of liquid you drink to help control your urine leakage. Do not drink any liquid right before you go to bed. Limit or do not have drinks that contain caffeine or alcohol.   Prevent constipation.  Eat a variety of high-fiber foods. Good examples are high-fiber cereals, beans, vegetables, and whole-grain breads. Walking is the best way to trigger your intestines to have a bowel movement.  Exercise regularly and maintain a healthy weight.  Weight loss and exercise will decrease pressure on your bladder and help you control your leakage.   Use a  catheter as directed  to help empty your bladder. A catheter is a tiny, plastic tube that is put into your bladder to drain your urine.   Go to behavior therapy as directed.  Behavior therapy may be used to help you learn to control your urge to urinate.     © Copyright [employer] 2018 Information is for End User's use only and may not be sold, redistributed or otherwise used for commercial purposes. All illustrations and images included in CareNotes® are the copyrighted property of [employer] or [employer]

## 2025-03-14 NOTE — PROGRESS NOTES
Assessment/Plan:    Medicare annual wellness visit, subsequent  -Medicare annual wellness visit done   - last Cologuard test was done in 2022 and she will be due July 2025.  Will order Cologuard today.  -  Lung cancer screen is not indicated because she quit smoking cigarettes over 20 years ago.  - DEXA scan was done in December 2023 and showed osteopenia.  Will order repeat DEXA scan to be done in December 2025.  She was counseled to continue with vitamin D, calcium supplements and weightbearing exercise.  -last mammogram was done over 2 years ago and was ordered but patient is yet to get it done.  Will order another mammogram today.  -She is up-to-date with 2 COVID vaccines, pneumococcal vaccine and Tdap and does not want any more vaccines.  -follow-up in 6 months.      Hyperlipidemia  -Lipids are worsening  - last lipid panel done on 3/12/2025, showed a total cholesterol of 263, up from 213 on 8/23/2023,  A triglyceride of 79, down from 86, and HDL of 64, up from 60 and an LDL of 183, up from 136  -We will trial patient on 2 mg of Pitavastatin and monitor her for side effects and she was counseled that she can try the medication every other day if it causes her to have myalgias.  -We will order an LFT to be done 1 to 2 weeks after starting statins.  -continue with a heart healthy diet  -continue with exercise    Hypertension   - blood pressure is stable  -continue with losartan and amlodipine  -continue with a low-salt diet and with exercise      Reactive airway disease   - stable without acute exacerbation  - continue with Trelegy inhaler and p.r.n. albuterol inhaler      Reactive depression  - well controlled   - continue with venlafaxine    Prediabetes  -Stable  -Patient was counseled to cut down on carbs and sweets and to increase her exercise  -Will recheck fasting blood glucose and hemoglobin A1c at next visit    Marijuana use  -Patient was counseled on the importance of avoiding smoking marijuana or any  other product especially with her history of reactive airway disease    The 10-year ASCVD risk score (Albert CHAVIRA, et al., 2019) is: 11.4%    Values used to calculate the score:      Age: 69 years      Sex: Female      Is Non- : No      Diabetic: No      Tobacco smoker: No      Systolic Blood Pressure: 138 mmHg      Is BP treated: Yes      HDL Cholesterol: 64 mg/dL      Total Cholesterol: 263 mg/dL       Diagnoses and all orders for this visit:    Medicare annual wellness visit, subsequent    Essential hypertension    Mild intermittent reactive airway disease without complication    Reactive depression    Prediabetes    Other hyperlipidemia  -     pitavastatin (LIVALO) 2 mg; Take 1 tablet (2 mg total) by mouth daily with dinner  -     Hepatic function panel; Future  -     Lipid panel; Future    Encounter for screening mammogram for breast cancer  Comments:  last mammogram was 2 years ago and she is over due - it was ordered but she is yet to do it. will order another one today.    Screening for colorectal cancer  Comments:  last cologuard test was in 2022 and she will be due in July 2025. we will order a cologuard test today. NO known fam hx of colon ca  Orders:  -     Cologuard    Osteopenia of other site  Comments:  last dexa scan was in Dec 2023 and showed osteopenia and she will be d ue in Dec 2025. takes a multivitamin  Orders:  -     DXA bone density spine hip and pelvis; Future    Encounter for immunization  Comments:  got 2 covid shots and got one pneumococcal shot and tdap and does not want anymore shots    On statin therapy  -     Hepatic function panel; Future    Marijuana use          Subjective:      Patient ID: Kenzie Al is a 69 y.o. female.    HPI    Patient presents for a medicare annual wellness visit as well as a follow up visit regarding her hypertension, reactive airway disease, depression, prediabetes and hyperlipidemia.  She wants to review her labs that she was  "sent needed.  Of note, her cholesterol recently increased.  She states that she took Crestor previously and it made her stiff and achy and when she stopped taking it the symptoms stopped   Her sister has the same experience and currently takes her cholesterol medication every other day.  She states that her mood is okay given that she currently has multiple life stressors with her son.  Her blood sugar was also elevated.  ? Dm in [facility]  She states that she has been eating more sweets and eating later in the day   She states that she does not eat breakfast and rarely lunch and tries to eat good at dinner.  Drinks alcoholic 2 drinks a night and sometimes more on weekends.  Smoked cigs for 30 years and quit in 2000 and now smokes marijuana-she states that the marijuana makes her feel \"comfortably numb\"  Has not been checking her bp at home   Tries to watch her salt intake   Doing wt bearing exercise for her osteopenia.  Never needs to use her rescue inhaler as long as she uses her Trelegy.  She states that the price goes up and down with her insurance.  She admits to night sweats, productive cough, shortness of breath on exertion, occasional wheezing, constipation alternating with diarrhea based on what she eats, occasional back pain and denies any  fever, chills,  headache, dizziness, nasal congestion, runny nose, pnd, sore throat, ear ache, sinus pain or pressure,  chest pain, palpitations, nausea, vomiting, abdominal pain, hematochezia, hematuria, melena stools, arthralgias, myalgias, or insomnia.      The following portions of the patient's history were reviewed and updated as appropriate: She  has a past medical history of Asthma, Depression, Encounter for screening for lung cancer (08/26/2020), History of transfusion, Hypertension, Influenza vaccination declined by patient (12/09/2021), and PONV (postoperative nausea and vomiting).  She   Patient Active Problem List    Diagnosis Date Noted   • Elevated hemoglobin " ([facility]) 01/26/2024   • Arrhythmia 08/21/2023   • Tachycardia 08/21/2023   • Mild intermittent asthma with acute exacerbation 08/21/2023   • Chronic cough 07/17/2023   • Reactive airway disease without complication 07/15/2022   • Nail abnormalities 09/09/2021   • Reactive depression 09/09/2021   • Medication side effect, initial encounter 09/09/2021   • Dyspnea on exertion 06/02/2021   • Hyponatremia 06/02/2021   • Other hyperlipidemia 05/05/2021   • Prediabetes 05/05/2021   • Aftercare following surgery 10/08/2020   • Essential hypertension 08/27/2020   • Closed fracture of olecranon process of right ulna 08/27/2020   • Pain and swelling of upper extremity, right 08/26/2020   • Bruising of multiple areas 08/26/2020   • Right elbow pain 08/26/2020     She  has a past surgical history that includes Bunionectomy; Tubal ligation; ELBOW FRACTURE REPAIR (Right, 08/31/2020); and Fracture surgery.  Her family history includes Hypertension in her mother; Kidney cancer in her father; Lung cancer (age of onset: 49) in her father; Melanoma (age of onset: 18) in her sister; No Known Problems in her brother, maternal aunt, maternal aunt, maternal aunt, maternal aunt, maternal aunt, maternal aunt, maternal aunt, maternal aunt, maternal grandfather, maternal grandmother, paternal aunt, paternal aunt, paternal grandfather, and paternal grandmother; Parkinsonism in her mother.  She  reports that she quit smoking about 25 years ago. Her smoking use included cigarettes. She started smoking about 45 years ago. She has a 30 pack-year smoking history. She has never used smokeless tobacco. She reports current alcohol use of about 7.0 standard drinks of alcohol per week. She reports current drug use. Frequency: 7.00 times per week. Drug: Marijuana.  Current Outpatient Medications   Medication Sig Dispense Refill   • albuterol (PROVENTIL HFA,VENTOLIN HFA) 90 mcg/act inhaler Inhale 2 puffs every 6 (six) hours as needed for wheezing or shortness  of breath 8 g 5   • amLODIPine (NORVASC) 5 mg tablet TAKE 1 TABLET (5 MG TOTAL) BY MOUTH DAILY. 90 tablet 1   • clobetasol (TEMOVATE) 0.05 % cream as needed     • losartan (COZAAR) 100 MG tablet TAKE 1 TABLET BY MOUTH EVERY DAY 90 tablet 1   • multivitamin (THERAGRAN) TABS Take 1 tablet by mouth daily     • pitavastatin (LIVALO) 2 mg Take 1 tablet (2 mg total) by mouth daily with dinner 30 tablet 0   • Trelegy Ellipta 200-62.5-25 MCG/ACT AEPB inhaler INHALE 1 PUFF DAILY RINSE MOUTH AFTER USE. 60 each 5   • venlafaxine (EFFEXOR-XR) 75 mg 24 hr capsule TAKE 1 CAPSULE BY MOUTH DAILY WITH BREAKFAST. 90 capsule 1     No current facility-administered medications for this visit.     Current Outpatient Medications on File Prior to Visit   Medication Sig   • albuterol (PROVENTIL HFA,VENTOLIN HFA) 90 mcg/act inhaler Inhale 2 puffs every 6 (six) hours as needed for wheezing or shortness of breath   • amLODIPine (NORVASC) 5 mg tablet TAKE 1 TABLET (5 MG TOTAL) BY MOUTH DAILY.   • clobetasol (TEMOVATE) 0.05 % cream as needed   • losartan (COZAAR) 100 MG tablet TAKE 1 TABLET BY MOUTH EVERY DAY   • multivitamin (THERAGRAN) TABS Take 1 tablet by mouth daily   • Trelegy Ellipta 200-62.5-25 MCG/ACT AEPB inhaler INHALE 1 PUFF DAILY RINSE MOUTH AFTER USE.   • venlafaxine (EFFEXOR-XR) 75 mg 24 hr capsule TAKE 1 CAPSULE BY MOUTH DAILY WITH BREAKFAST.     No current facility-administered medications on file prior to visit.     She has no known allergies..    Review of Systems   Constitutional:  Positive for diaphoresis (hot flashes). Negative for activity change, chills, fatigue, fever and unexpected weight change.   HENT:  Negative for ear pain, postnasal drip, rhinorrhea, sinus pressure and sore throat.    Eyes:  Negative for pain.   Respiratory:  Positive for cough (productive but she does not look), shortness of breath (on exertion or with cold winds) and wheezing. Negative for choking and chest tightness.    Cardiovascular:  Negative  "for chest pain, palpitations and leg swelling.   Gastrointestinal:  Positive for constipation and diarrhea (alternating depending on diet). Negative for abdominal pain, nausea and vomiting.   Genitourinary:  Negative for dysuria and hematuria.   Musculoskeletal:  Positive for back pain (occasionally and uses  massage). Negative for arthralgias, gait problem, joint swelling, myalgias and neck stiffness.   Skin:  Negative for pallor and rash.   Neurological:  Negative for dizziness, tremors, seizures, syncope, light-headedness and headaches.   Hematological:  Negative for adenopathy.   Psychiatric/Behavioral:  Positive for dysphoric mood (coping). Negative for behavioral problems and sleep disturbance. The patient is nervous/anxious (coping).          Objective:      /80 (BP Location: Left arm, Patient Position: Sitting, Cuff Size: Standard)   Pulse 71   Temp 99.2 °F (37.3 °C) (Temporal)   Resp 18   Ht 5' 6.5\" (1.689 m)   Wt 67.8 kg (149 lb 6.4 oz)   SpO2 96%   BMI 23.75 kg/m²          Physical Exam  Constitutional:       General: She is not in acute distress.     Appearance: She is well-developed. She is not diaphoretic.   HENT:      Head: Normocephalic and atraumatic.      Right Ear: External ear normal.      Left Ear: External ear normal.      Nose: Nose normal.      Mouth/Throat:      Mouth: Mucous membranes are dry.      Pharynx: Posterior oropharyngeal erythema present. No oropharyngeal exudate.   Eyes:      General: No scleral icterus.        Right eye: No discharge.         Left eye: No discharge.      Conjunctiva/sclera: Conjunctivae normal.      Pupils: Pupils are equal, round, and reactive to light.   Neck:      Thyroid: No thyromegaly.      Vascular: No JVD.      Trachea: No tracheal deviation.   Cardiovascular:      Rate and Rhythm: Normal rate and regular rhythm.      Heart sounds: Normal heart sounds. No murmur heard.     No friction rub. No gallop.   Pulmonary:      Effort: Pulmonary " effort is normal. No respiratory distress.      Breath sounds: Examination of the right-lower field reveals rales. Rales (rales in R lung base, not cleared by coughing while L basilar rales cleared with coughing) present. No wheezing.   Chest:      Chest wall: No tenderness.   Abdominal:      General: Bowel sounds are normal. There is no distension.      Palpations: Abdomen is soft. There is no mass.      Tenderness: There is no abdominal tenderness. There is no guarding or rebound.   Musculoskeletal:         General: No tenderness or deformity. Normal range of motion.      Cervical back: Normal range of motion and neck supple.   Lymphadenopathy:      Cervical: No cervical adenopathy.   Skin:     General: Skin is warm and dry.      Coloration: Skin is not pale.      Findings: No erythema or rash.   Neurological:      Mental Status: She is alert and oriented to person, place, and time.      Cranial Nerves: No cranial nerve deficit.      Motor: No abnormal muscle tone.      Coordination: Coordination normal.      Deep Tendon Reflexes: Reflexes are normal and symmetric.   Psychiatric:         Behavior: Behavior normal.           Appointment on 03/12/2025   Component Date Value Ref Range Status   • WBC 03/12/2025 8.22  4.31 - 10.16 Thousand/uL Final   • RBC 03/12/2025 4.76  3.81 - 5.12 Million/uL Final   • Hemoglobin 03/12/2025 14.5  11.5 - 15.4 g/dL Final   • Hematocrit 03/12/2025 43.5  34.8 - 46.1 % Final   • MCV 03/12/2025 91  82 - 98 fL Final   • MCH 03/12/2025 30.5  26.8 - 34.3 pg Final   • MCHC 03/12/2025 33.3  31.4 - 37.4 g/dL Final   • RDW 03/12/2025 12.6  11.6 - 15.1 % Final   • MPV 03/12/2025 11.4  8.9 - 12.7 fL Final   • Platelets 03/12/2025 172  149 - 390 Thousands/uL Final   • nRBC 03/12/2025 0  /100 WBCs Final   • Segmented % 03/12/2025 52  43 - 75 % Final   • Immature Grans % 03/12/2025 0  0 - 2 % Final   • Lymphocytes % 03/12/2025 32  14 - 44 % Final   • Monocytes % 03/12/2025 9  4 - 12 % Final   •  Eosinophils Relative 03/12/2025 6  0 - 6 % Final   • Basophils Relative 03/12/2025 1  0 - 1 % Final   • Absolute Neutrophils 03/12/2025 4.25  1.85 - 7.62 Thousands/µL Final   • Absolute Immature Grans 03/12/2025 0.02  0.00 - 0.20 Thousand/uL Final   • Absolute Lymphocytes 03/12/2025 2.62  0.60 - 4.47 Thousands/µL Final   • Absolute Monocytes 03/12/2025 0.75  0.17 - 1.22 Thousand/µL Final   • Eosinophils Absolute 03/12/2025 0.47  0.00 - 0.61 Thousand/µL Final   • Basophils Absolute 03/12/2025 0.11 (H)  0.00 - 0.10 Thousands/µL Final   • Sodium 03/12/2025 138  135 - 147 mmol/L Final   • Potassium 03/12/2025 4.1  3.5 - 5.3 mmol/L Final   • Chloride 03/12/2025 101  96 - 108 mmol/L Final   • CO2 03/12/2025 28  21 - 32 mmol/L Final   • ANION GAP 03/12/2025 9  4 - 13 mmol/L Final   • BUN 03/12/2025 12  5 - 25 mg/dL Final   • Creatinine 03/12/2025 0.75  0.60 - 1.30 mg/dL Final    Standardized to IDMS reference method   • Glucose, Fasting 03/12/2025 152 (H)  65 - 99 mg/dL Final   • Calcium 03/12/2025 9.6  8.4 - 10.2 mg/dL Final   • AST 03/12/2025 15  13 - 39 U/L Final   • ALT 03/12/2025 16  7 - 52 U/L Final    Specimen collection should occur prior to Sulfasalazine administration due to the potential for falsely depressed results.    • Alkaline Phosphatase 03/12/2025 94  34 - 104 U/L Final   • Total Protein 03/12/2025 7.0  6.4 - 8.4 g/dL Final   • Albumin 03/12/2025 4.4  3.5 - 5.0 g/dL Final   • Total Bilirubin 03/12/2025 0.70  0.20 - 1.00 mg/dL Final    Use of this assay is not recommended for patients undergoing treatment with eltrombopag due to the potential for falsely elevated results.  N-acetyl-p-benzoquinone imine (metabolite of Acetaminophen) will generate erroneously low results in samples for patients that have taken an overdose of Acetaminophen.   • eGFR 03/12/2025 82  ml/min/1.73sq m Final   • Hemoglobin A1C 03/12/2025 6.4 (H)  Normal 4.0-5.6%; PreDiabetic 5.7-6.4%; Diabetic >=6.5%; Glycemic control for adults  with diabetes <7.0% % Final   • EAG 03/12/2025 137  mg/dl Final   • Cholesterol 03/12/2025 263 (H)  See Comment mg/dL Final    Cholesterol:         Pediatric <18 Years        Desirable          <170 mg/dL      Borderline High    170-199 mg/dL      High               >=200 mg/dL        Adult >=18 Years            Desirable         <200 mg/dL      Borderline High   200-239 mg/dL      High              >239 mg/dL     • Triglycerides 03/12/2025 79  See Comment mg/dL Final    Triglyceride:     0-9Y            <75mg/dL     10Y-17Y         <90 mg/dL       >=18Y     Normal          <150 mg/dL     Borderline High 150-199 mg/dL     High            200-499 mg/dL        Very High       >499 mg/dL    Specimen collection should occur prior to Metamizole administration due to the potential for falsely depressed results.   • HDL, Direct 03/12/2025 64  >=50 mg/dL Final   • LDL Calculated 03/12/2025 183 (H)  0 - 100 mg/dL Final    LDL Cholesterol:     Optimal           <100 mg/dl     Near Optimal      100-129 mg/dl     Above Optimal       Borderline High 130-159 mg/dl       High            160-189 mg/dl       Very High       >189 mg/dl         This screening LDL is a calculated result.   It does not have the accuracy of the Direct Measured LDL in the monitoring of patients with hyperlipidemia and/or statin therapy.   Direct Measure LDL (VAY033) must be ordered separately in these patients.   • Non-HDL-Chol (CHOL-HDL) 03/12/2025 199  mg/dl Final

## 2025-03-20 DIAGNOSIS — I10 ESSENTIAL HYPERTENSION: ICD-10-CM

## 2025-03-20 RX ORDER — AMLODIPINE BESYLATE 5 MG/1
5 TABLET ORAL DAILY
Qty: 90 TABLET | Refills: 1 | Status: SHIPPED | OUTPATIENT
Start: 2025-03-20

## 2025-03-20 RX ORDER — LOSARTAN POTASSIUM 100 MG/1
100 TABLET ORAL DAILY
Qty: 90 TABLET | Refills: 1 | Status: SHIPPED | OUTPATIENT
Start: 2025-03-20

## 2025-04-12 DIAGNOSIS — E78.49 OTHER HYPERLIPIDEMIA: ICD-10-CM

## 2025-04-14 RX ORDER — PITAVASTATIN CALCIUM 2.09 MG/1
2 TABLET, FILM COATED ORAL
Qty: 90 TABLET | Refills: 1 | Status: SHIPPED | OUTPATIENT
Start: 2025-04-14 | End: 2025-05-14

## 2025-05-02 ENCOUNTER — TELEPHONE (OUTPATIENT)
Age: 70
End: 2025-05-02

## 2025-05-02 NOTE — TELEPHONE ENCOUNTER
Patient called stating she is using the Trelegy Ellipta for COPD, and she is concerned that her memory is failing.  Her sister thinks she is suffering as a result of side effects from the inhaler from what the sister found on the internet.  Patient  thinks she might need a different brand inhaler.  Explained that she might have to come in for an appointment, but she wanted to see what the PCP thinks.  Please advise.

## 2025-05-02 NOTE — TELEPHONE ENCOUNTER
Spoke with patient- aware PCP is out of office. Patient states there is no urgency and would like to see PCP only. Appt scheduled for 5/12/25 to discuss medication in office.  Patient aware if symptoms worsen to call office to be seen.

## 2025-07-08 DIAGNOSIS — F32.9 REACTIVE DEPRESSION: ICD-10-CM

## 2025-07-09 RX ORDER — VENLAFAXINE HYDROCHLORIDE 75 MG/1
75 CAPSULE, EXTENDED RELEASE ORAL
Qty: 90 CAPSULE | Refills: 1 | Status: SHIPPED | OUTPATIENT
Start: 2025-07-09

## 2025-08-06 DIAGNOSIS — J44.1 ACUTE EXACERBATION OF CHRONIC OBSTRUCTIVE PULMONARY DISEASE (COPD) (HCC): ICD-10-CM

## 2025-08-06 DIAGNOSIS — Z23 ENCOUNTER FOR IMMUNIZATION: ICD-10-CM

## 2025-08-06 DIAGNOSIS — J45.30 MILD PERSISTENT REACTIVE AIRWAY DISEASE WITHOUT COMPLICATION: ICD-10-CM

## 2025-08-06 RX ORDER — FLUTICASONE FUROATE, UMECLIDINIUM BROMIDE AND VILANTEROL TRIFENATATE 200; 62.5; 25 UG/1; UG/1; UG/1
1 POWDER RESPIRATORY (INHALATION) DAILY
Qty: 60 EACH | Refills: 3 | Status: SHIPPED | OUTPATIENT
Start: 2025-08-06

## (undated) DEVICE — CUFF TOURNIQUET 18 X 4 IN QUICK CONNECT DISP 1 BLADDER

## (undated) DEVICE — 1.6MM KIRSCHNER WIRE W/TROCAR POINT 150MM
Type: IMPLANTABLE DEVICE | Site: ELBOW | Status: NON-FUNCTIONAL
Removed: 2020-08-31

## (undated) DEVICE — ACE WRAP 4 IN UNSTERILE

## (undated) DEVICE — PAD GROUNDING ADULT

## (undated) DEVICE — SILVER-COATED ANTIMICROBIAL BARRIER DRESSING: Brand: ACTICOAT   4" X 8"

## (undated) DEVICE — UNIVERSAL MAJOR EXTREMITY,KIT: Brand: CARDINAL HEALTH

## (undated) DEVICE — DRAPE C-ARM X-RAY

## (undated) DEVICE — GAUZE SPONGES,16 PLY: Brand: CURITY

## (undated) DEVICE — PLUMEPEN PRO 10FT

## (undated) DEVICE — SUT VICRYL PLUS 1 CTB-1 36 IN VCPB947H

## (undated) DEVICE — GLOVE INDICATOR PI UNDERGLOVE SZ 8.5 BLUE

## (undated) DEVICE — DRAPE SHEET X-LG

## (undated) DEVICE — ABDOMINAL PAD: Brand: DERMACEA

## (undated) DEVICE — DRAPE EQUIPMENT RF WAND

## (undated) DEVICE — KERLIX BANDAGE ROLL: Brand: KERLIX

## (undated) DEVICE — CHLORAPREP HI-LITE 26ML ORANGE

## (undated) DEVICE — 4.0MM CANCELLOUS BONE SCREW FULLY THREADED/28MM: Type: IMPLANTABLE DEVICE | Site: ELBOW | Status: NON-FUNCTIONAL

## (undated) DEVICE — INTENDED FOR TISSUE SEPARATION, AND OTHER PROCEDURES THAT REQUIRE A SHARP SURGICAL BLADE TO PUNCTURE OR CUT.: Brand: BARD-PARKER SAFETY BLADES SIZE 10, STERILE

## (undated) DEVICE — STOCKINETTE REGULAR

## (undated) DEVICE — BLADE SAGITTAL 25.6 X 9.5MM

## (undated) DEVICE — ACE WRAP 6 IN UNSTERILE

## (undated) DEVICE — PADDING CAST 4 IN  COTTON STRL

## (undated) DEVICE — GLOVE SRG BIOGEL 8

## (undated) DEVICE — SPONGE PVP SCRUB WING STERILE